# Patient Record
Sex: FEMALE | Race: BLACK OR AFRICAN AMERICAN | NOT HISPANIC OR LATINO | URBAN - METROPOLITAN AREA
[De-identification: names, ages, dates, MRNs, and addresses within clinical notes are randomized per-mention and may not be internally consistent; named-entity substitution may affect disease eponyms.]

---

## 2018-05-07 ENCOUNTER — OUTPATIENT (OUTPATIENT)
Dept: OUTPATIENT SERVICES | Facility: HOSPITAL | Age: 83
LOS: 1 days | End: 2018-05-07
Payer: COMMERCIAL

## 2018-05-07 ENCOUNTER — APPOINTMENT (OUTPATIENT)
Dept: MAMMOGRAPHY | Facility: IMAGING CENTER | Age: 83
End: 2018-05-07

## 2018-05-07 DIAGNOSIS — Z00.8 ENCOUNTER FOR OTHER GENERAL EXAMINATION: ICD-10-CM

## 2018-05-07 PROCEDURE — 77063 BREAST TOMOSYNTHESIS BI: CPT

## 2018-05-07 PROCEDURE — 77063 BREAST TOMOSYNTHESIS BI: CPT | Mod: 26

## 2018-05-07 PROCEDURE — 77067 SCR MAMMO BI INCL CAD: CPT

## 2018-05-07 PROCEDURE — 77067 SCR MAMMO BI INCL CAD: CPT | Mod: 26

## 2021-08-19 ENCOUNTER — APPOINTMENT (OUTPATIENT)
Dept: OTOLARYNGOLOGY | Facility: CLINIC | Age: 86
End: 2021-08-19
Payer: MEDICARE

## 2021-08-19 VITALS
BODY MASS INDEX: 23.05 KG/M2 | WEIGHT: 135 LBS | SYSTOLIC BLOOD PRESSURE: 160 MMHG | DIASTOLIC BLOOD PRESSURE: 74 MMHG | HEIGHT: 64 IN

## 2021-08-19 DIAGNOSIS — H93.8X3 OTHER SPECIFIED DISORDERS OF EAR, BILATERAL: ICD-10-CM

## 2021-08-19 DIAGNOSIS — Z78.9 OTHER SPECIFIED HEALTH STATUS: ICD-10-CM

## 2021-08-19 DIAGNOSIS — Z86.79 PERSONAL HISTORY OF OTHER DISEASES OF THE CIRCULATORY SYSTEM: ICD-10-CM

## 2021-08-19 PROCEDURE — 99203 OFFICE O/P NEW LOW 30 MIN: CPT | Mod: 25

## 2021-08-19 PROCEDURE — G0268 REMOVAL OF IMPACTED WAX MD: CPT

## 2021-08-19 PROCEDURE — 92567 TYMPANOMETRY: CPT

## 2021-08-19 PROCEDURE — 92557 COMPREHENSIVE HEARING TEST: CPT

## 2021-08-19 NOTE — HISTORY OF PRESENT ILLNESS
[de-identified] : 86 year old female referred by Dr Nurys Hernandes for clogged ears. Daughter states hearing has decreased from both ears. Patient denies otalgia, otorrhea, ear infections, tinnitus, dizziness, vertigo, or headaches related to hearing. Last audiology exam was done 2 years ago. \par

## 2021-08-19 NOTE — REASON FOR VISIT
[Initial Consultation] : an initial consultation for [FreeTextEntry2] : referred by Dr Nurys Hernandes for clogged ears

## 2021-08-19 NOTE — DATA REVIEWED
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 Moderate to profound essentially SNHL AD. Type A (normal) tympanograms AU.

## 2021-08-19 NOTE — CONSULT LETTER
[Dear  ___] : Dear  [unfilled], [Consult Letter:] : I had the pleasure of evaluating your patient, [unfilled]. [Please see my note below.] : Please see my note below. [Consult Closing:] : Thank you very much for allowing me to participate in the care of this patient.  If you have any questions, please do not hesitate to contact me. [Sincerely,] : Sincerely, [FreeTextEntry3] : Sae Talamantes MD, FACS \par  of Otolaryngology  \par [facility] \par [address] \[place] [address] \par Phone: [phone] \par Fax: [phone] \par \par

## 2021-09-08 ENCOUNTER — APPOINTMENT (OUTPATIENT)
Dept: NEUROLOGY | Facility: CLINIC | Age: 86
End: 2021-09-08
Payer: MEDICARE

## 2021-09-08 VITALS
OXYGEN SATURATION: 98 % | BODY MASS INDEX: 21.51 KG/M2 | SYSTOLIC BLOOD PRESSURE: 163 MMHG | HEART RATE: 65 BPM | TEMPERATURE: 97.9 F | WEIGHT: 126 LBS | HEIGHT: 64 IN | DIASTOLIC BLOOD PRESSURE: 81 MMHG

## 2021-09-08 PROCEDURE — 99205 OFFICE O/P NEW HI 60 MIN: CPT

## 2021-09-08 RX ORDER — LATANOPROST/PF 0.005 %
0.01 DROPS OPHTHALMIC (EYE)
Qty: 10 | Refills: 0 | Status: ACTIVE | COMMUNITY
Start: 2021-08-25

## 2021-09-08 NOTE — PHYSICAL EXAM
[FreeTextEntry1] : PHYSICAL EXAM\par Constitutional: Alert, no acute distress \par Neck: Full range of motion\par Psychiatric: appropriate affect and mood\par Pulmonary: No respiratory distress, stable on room air\par \par NEUROLOGICAL EXAM\par Mental status: The patient is alert, attentive, and oriented to self only and . Not to date or president or current news. \par Speech/language: No dysarthria. Need to repeat things and speak loud in order for her to comprehend tasks (+ hearing impairment)\par Cranial nerves:\par CN II: Pupil size equal and briskly reactive to light. \par CN III, IV, VI: EOMI, no nystagmus, no ptosis\par CN V: Facial sensation is intact to pinprick in all 3 divisions bilaterally.\par CN VII: Face is symmetric with normal eye closure and smile.\par CN VII: Hearing is normal to rubbing fingers\par CN IX, X: Palate elevates symmetrically. Phonation is normal.\par CN XI: Head turning and shoulder shrug are intact\par CN XII: Tongue is midline with normal movements and no atrophy.\par Motor: There is no pronator drift of out-stretched arms. 5/5 muscle power at bilat: Deltoid, Biceps, Triceps, Wrist ext, Finger abd, Hip flex, Hip ext, Knee flex, Knee ext, Ankle flex, Ankle ext\par Reflexes: Reflexes are 3+ and symmetric at the biceps and knees. Plantar responses are flexor.\par Sensory: Intact sensations to light touch in upper and lower extremities\par Coordination: Rapid alternating movements and fine finger movements are intact. There is no dysmetria on finger-to-nose. There are no abnormal or extraneous movements. \par Gait/Stance: Posture is normal. Gait is steady with normal steps, base, arm swing, and turning. \par \par \par \par MOCA :\par Total       +1 for education < 12 grade: \par \par Visuospatial/executive- 0/5 , "I do not know how to draw"\par Naming- 0/3 \par Memory/delayed recall- immediate recall 1/5, delayed recall 0/5,  \par Attention- 3/6 (unable to do serial 7's)\par Abstraction- 0/2\par Language- 1/3 (7 "F" words in 1 minute (repeats words), 9 animals in 1 minute) \par Orientation- 2/6\par

## 2021-09-08 NOTE — HISTORY OF PRESENT ILLNESS
[FreeTextEntry1] : HPI (initial visit Sep 08, 2021)- Gloria is a 86 year old RH female with hx of HTN, HLD, CAD s/p stent,  referred for hx of memory loss. She is accompanied by granddaughter, Fela. \par \par Hx obtained from pt:- "I am confused". "I don't know why I am here". She is aware she is in a doctors clinic. She tells me she lives in a house in [place] with family- granddaughter, daughter and her . She could not recall what her occupation was in the past or when she retired. 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 When asked how many children she has, she counts with her fingers and says "6"-2 girls and 4 boys. 2 boys in [place] and 2 in the [facility] ([place]). "Memory is not to good, I forget". "I used to cook". Family cooks for her. She does not drive. Does not know why we are wearing masks and does not know current president.\par \par Hx obtained from granddaughter-Memory issues x 3-4 years. She had a cognitive evaluation through insurance and did poorly. She forgets conversations in 5 minutes. She cannot stay alone at home, she gets confused. She corrects pt and says they lives in [place] and not [place]. She experiences sundowning at night and gets confused if she lives house at night. She remembers names of family members she frequently meets. She stays in [place] in calles and summers she is in [place], travels back and forth. Someone is almost always with her at home. She is able to feed and dress herself. She can fold clothes, but not do laundry. She takes her own medications. She has daily routine. no hallucinations. no paranoia. no personality changes. She gets tearful easily for no particular reason. \par \par She had a sister with "dementia".

## 2021-09-08 NOTE — ASSESSMENT
[FreeTextEntry1] : Assessment/Plan:\par  3-4 year hx of progressive cognitive decline which interferes with daily activities (requires assistance with most iADLs)\par \par No focal neurological signs on exam or parkinsonism. Exam is notable for hearing impairment. On cognitive assessment (MOCA), she scored a 7/30 with significant executive and memory impairments. \par \par Dementia without behavioral disturbance- suspect Alzheimers disease. Will complete work up for other causes of dementia, including vascular and metabolic etiologies \par \par Counseled family on the diagnosis of dementia- presumedly Alzheimers disease- and the progression of disease. Alzheimers is a progressive neurodegenerative disease which affects memory, language and thinking. There are no curative or disease modifying treatments. We discussed the different cognition enhancing therapies, including donepezil and memantine, and discussed the potential side effects. Explained that the therapies do not halt the progression. Given her severe dementia, I do not suspect she would have much benefit from treatment and it may lead to more confusion. Granddaughter understands and will speak to family and reach back out to clinic to discuss further if they wish to try therapy. \par \par Plan:-\par [] Will order labs for reversible causes of memory loss\par [] WIll order MRI brain - memory protocol\par [] Will refer to geriatric clinic for social work needs\par [] Advised use of hearing aids- patient has hearing impairment. \par \par Return to clinic 6 months, or sooner if needed. Will call with results of labs and MRI brain\par \par Melita Robles M.D\par

## 2021-09-25 ENCOUNTER — APPOINTMENT (OUTPATIENT)
Dept: MRI IMAGING | Facility: IMAGING CENTER | Age: 86
End: 2021-09-25

## 2021-10-19 ENCOUNTER — TRANSCRIPTION ENCOUNTER (OUTPATIENT)
Age: 86
End: 2021-10-19

## 2021-10-20 ENCOUNTER — LABORATORY RESULT (OUTPATIENT)
Age: 86
End: 2021-10-20

## 2021-10-26 ENCOUNTER — APPOINTMENT (OUTPATIENT)
Dept: MRI IMAGING | Facility: CLINIC | Age: 86
End: 2021-10-26
Payer: MEDICARE

## 2021-10-26 ENCOUNTER — OUTPATIENT (OUTPATIENT)
Dept: OUTPATIENT SERVICES | Facility: HOSPITAL | Age: 86
LOS: 1 days | End: 2021-10-26
Payer: COMMERCIAL

## 2021-10-26 DIAGNOSIS — R41.3 OTHER AMNESIA: ICD-10-CM

## 2021-10-26 PROCEDURE — 70551 MRI BRAIN STEM W/O DYE: CPT | Mod: 26

## 2021-10-26 PROCEDURE — 70551 MRI BRAIN STEM W/O DYE: CPT

## 2021-11-01 ENCOUNTER — NON-APPOINTMENT (OUTPATIENT)
Age: 86
End: 2021-11-01

## 2021-11-01 ENCOUNTER — APPOINTMENT (OUTPATIENT)
Dept: GERIATRICS | Facility: CLINIC | Age: 86
End: 2021-11-01
Payer: MEDICARE

## 2021-11-01 VITALS
HEART RATE: 78 BPM | WEIGHT: 130.25 LBS | HEIGHT: 64 IN | DIASTOLIC BLOOD PRESSURE: 80 MMHG | OXYGEN SATURATION: 98 % | RESPIRATION RATE: 16 BRPM | SYSTOLIC BLOOD PRESSURE: 110 MMHG | BODY MASS INDEX: 22.24 KG/M2 | TEMPERATURE: 97.3 F

## 2021-11-01 DIAGNOSIS — Z81.8 FAMILY HISTORY OF OTHER MENTAL AND BEHAVIORAL DISORDERS: ICD-10-CM

## 2021-11-01 DIAGNOSIS — H26.9 UNSPECIFIED CATARACT: ICD-10-CM

## 2021-11-01 PROCEDURE — G0008: CPT | Mod: 59

## 2021-11-01 PROCEDURE — 99205 OFFICE O/P NEW HI 60 MIN: CPT | Mod: 25

## 2021-11-01 PROCEDURE — 90662 IIV NO PRSV INCREASED AG IM: CPT | Mod: 59

## 2021-11-01 PROCEDURE — G0444 DEPRESSION SCREEN ANNUAL: CPT | Mod: 59

## 2021-11-01 RX ORDER — ASCORBIC ACID/MULTIVIT-MIN 1000 MG
EFFERVESCENT POWDER IN PACKET ORAL
Refills: 0 | Status: ACTIVE | COMMUNITY
Start: 2021-11-01

## 2021-11-17 ENCOUNTER — APPOINTMENT (OUTPATIENT)
Dept: GERIATRICS | Facility: CLINIC | Age: 86
End: 2021-11-17
Payer: MEDICARE

## 2021-11-17 VITALS
TEMPERATURE: 97.4 F | HEART RATE: 74 BPM | HEIGHT: 64.8 IN | OXYGEN SATURATION: 99 % | RESPIRATION RATE: 16 BRPM | DIASTOLIC BLOOD PRESSURE: 74 MMHG | BODY MASS INDEX: 21.73 KG/M2 | SYSTOLIC BLOOD PRESSURE: 130 MMHG | WEIGHT: 130.4 LBS

## 2021-11-17 DIAGNOSIS — Z91.81 HISTORY OF FALLING: ICD-10-CM

## 2021-11-17 PROCEDURE — 99483 ASSMT & CARE PLN PT COG IMP: CPT

## 2021-12-02 ENCOUNTER — APPOINTMENT (OUTPATIENT)
Dept: GERIATRICS | Facility: CLINIC | Age: 86
End: 2021-12-02

## 2021-12-06 LAB
ALBUMIN SERPL ELPH-MCNC: 4.4 G/DL
ALP BLD-CCNC: 63 U/L
ALT SERPL-CCNC: 28 U/L
ANION GAP SERPL CALC-SCNC: 14 MMOL/L
AST SERPL-CCNC: 32 U/L
BILIRUB SERPL-MCNC: 0.3 MG/DL
BUN SERPL-MCNC: 35 MG/DL
CALCIUM SERPL-MCNC: 10.3 MG/DL
CHLORIDE SERPL-SCNC: 102 MMOL/L
CO2 SERPL-SCNC: 27 MMOL/L
CREAT SERPL-MCNC: 1.53 MG/DL
FOLATE SERPL-MCNC: 8.4 NG/ML
GLUCOSE SERPL-MCNC: 87 MG/DL
POTASSIUM SERPL-SCNC: 4.9 MMOL/L
PROT SERPL-MCNC: 7.3 G/DL
SODIUM SERPL-SCNC: 143 MMOL/L
T PALLIDUM AB SER QL IA: ABNORMAL
THYROGLOB AB SERPL-ACNC: <20 IU/ML
THYROPEROXIDASE AB SERPL IA-ACNC: <10 IU/ML
TSH SERPL-ACNC: 2.12 UIU/ML
VIT B1 SERPL-MCNC: 135.2 NMOL/L
VIT B12 SERPL-MCNC: 747 PG/ML

## 2022-01-18 ENCOUNTER — APPOINTMENT (OUTPATIENT)
Dept: GERIATRICS | Facility: CLINIC | Age: 87
End: 2022-01-18
Payer: MEDICARE

## 2022-01-18 VITALS
BODY MASS INDEX: 22.16 KG/M2 | RESPIRATION RATE: 16 BRPM | HEIGHT: 64.8 IN | DIASTOLIC BLOOD PRESSURE: 74 MMHG | WEIGHT: 133 LBS | SYSTOLIC BLOOD PRESSURE: 156 MMHG

## 2022-01-18 PROCEDURE — 99215 OFFICE O/P EST HI 40 MIN: CPT

## 2022-01-18 NOTE — ASSESSMENT
[FreeTextEntry1] : Social Care Plan\par 1. Respite: \par \par -Provided hotline contact to Alzheimer's Association [phone].\par -provided [place] [facility] contact to assist with resources and caregiver support. \par -Provided link to www. communityresourcefinder.org\par -provided [facility] agency \par -contact to ANTONIETA Lewis.\par \par \par 2. Caregiver Education:\par Sent e-mail to -\par \par I wanted to share useful tools to help manage your family member's dementia behaviors. I have included caregiver training videos from [facility] Dementia [facility] to help guide you and caretakers, as well as a daily care plan from the Alzheimer's Association. Maintaining a schedule and a structured day helps patients with dementia. I am available if you need any further assistance or have any questions. See below:\par 1.Agitation & Anxiety:\par  https://iPouritu.be/[email or web address]\par 2. Safety\par 3. Alzheimer's Association Daily Care Plan:\par  https://www.alz.org/help-support/caregiving/daily-care/daily-care-plan\par 4. Educated daughter  behaviors occur to acknowledge patient's emotions and redirect behavior with distractions, address physical needs. Provided examples. Provided examples. Daughter agreed to try this non-pharmacological approach. \par \par \par \par Look through books, picture albums, coloring books, painting, puzzles, play with ball/balloon, listen to music, stress ball, fold small items.\par \par 3. Alzheimer's Association Community Resource Finder \par    www.alz.org/nca/helping you/community-resource-finder \par \par \par 4. Caregiver support:\par -contact to  \par \par 4. Safety: \par -Pt. is accompanied 24/7 and reports safety concerns are not an issue at the moment.  Continue will fall safety precautions. \par \par 5. Caregiver stress:\par -.Counseled on non-pharmacological interventions for stress which include medication saritha (Calm), daily physical activity. \par 6. PT with [facility]\par 7. [facility] education folder attached PFD. \par \par \par \par -task sent to MD Child\par -ADC acuity RED, trial of SSRI, lack of primary care, Q1 month f/u.\par -available for urgent visits and as needed by phone. \par -direct patient time 1:30pm-2:30pm\par \par  Mary Purdy, MARY LOUP-BC

## 2022-01-18 NOTE — REVIEW OF SYSTEMS
[Feeling Poorly] : not feeling poorly [Feeling Tired] : not feeling tired [Sore Throat] : no sore throat [Chest Pain] : no chest pain [Palpitations] : no palpitations [Shortness Of Breath] : no shortness of breath [Cough] : no cough [SOB on Exertion] : no shortness of breath during exertion [Constipation] : no constipation [Diarrhea] : no diarrhea [Incontinence] : no incontinence [de-identified] : reported large circular scar on abdomen from childhood

## 2022-01-18 NOTE — DATA REVIEWED
[FreeTextEntry1] : MRI head- b/l scattered small vessel white matter disease ischemic changes, no acute infarcts or mass 9/14/21\par B12/folate WDL\par TSH 2.12 WDL\par CMP Cr 1.53 BUN 35\par syphilis confirmatory non-reactive\par \par Na 143 WDL\par \par

## 2022-01-18 NOTE — PHYSICAL EXAM
[Alert] : alert [Well Nourished] : well nourished [Well Developed] : well developed [Sclera] : the sclera and conjunctiva were normal [Normal Oral Mucosa] : normal oral mucosa [No Oral Pallor] : no oral pallor [Normal Outer Ear/Nose] : the ears and nose were normal in appearance [Normal Appearance] : the appearance of the neck was normal [No Respiratory Distress] : no respiratory distress [No Acc Muscle Use] : no accessory muscle use [Respiration, Rhythm And Depth] : normal respiratory rhythm and effort [Auscultation Breath Sounds / Voice Sounds] : lungs were clear to auscultation bilaterally [Normal PMI] : the apical impulse was abnormal [Normal S1, S2] : normal S1 and S2 [Murmurs] : no murmurs [Heart Rate And Rhythm] : heart rate was normal and rhythm regular [Edema] : edema was not present [Bowel Sounds] : normal bowel sounds [Abdomen Soft] : soft [Cervical Lymph Nodes Enlarged Posterior Bilaterally] : posterior cervical [Supraclavicular Lymph Nodes Enlarged Bilaterally] : supraclavicular [Cervical Lymph Nodes Enlarged Anterior Bilaterally] : anterior cervical, supraclavicular [Axillary Lymph Nodes Enlarged Bilaterally] : axillary [No CVA Tenderness] : no CVA  tenderness [No Spinal Tenderness] : no spinal tenderness [] : no rash [No Focal Deficits] : no focal deficits [Normal Affect] : the affect was normal [Normal Mood] : the mood was normal [de-identified] : elderly female, well dressed,interactive, answers appropriately, smiled, quiet today [FreeTextEntry1] : wearing glasses [de-identified] : midline abdomen scar noted on LUQ,LLQ, not painful to touch [de-identified] : ataxic gait [de-identified] : smiled

## 2022-01-18 NOTE — SOCIAL HISTORY
[With family] : lives with family [Female] : Female [FreeTextEntry1] : Justa Hebert [FreeTextEntry4] : granddaughter [[facility] Ford HospitalPHQ-9Score] : 1 [CaregiverMSCIScore] : 18 [CaregiverDBSScore] : 28

## 2022-01-18 NOTE — HISTORY OF PRESENT ILLNESS
[One fall no injury in past year] : Patient reported one fall in the past year without injury [Patient is independent with] : bathing [Independent] : transferring/mobility [Full assistance needed] : Assistance needed managing medications [] : Assistance needed managing finances. [FAST Score: ____] : Functional Assessment Scale (FAST) Score: [unfilled] [Night Light] : night light [Anti-Slip Measures] : anti-slip measures [Mild] : Stage: Mild [Worse] : Status: Worse [Reviewed no changes] : Reviewed, no changes [I will adhere to the patient's wishes.] : I will adhere to the patient's wishes. [FreeTextEntry1] : ADC Program ID:[id number]\par \par Ms. BOO PRASAD is an 87 yo F with PMhx of dementia, CAD, HTN, HLD, gait instability presents for follow up visit  Alzheimer's and Dementia Program visit referred by MD Child for comanagement of dementia-related issues and coordination of dementia care.  \par Pt. presents with daughter (Sarah) and granddaughter (Justa) who assisted with hx intake due to pt.'s dementia. \par \par \par #dementia\par -I'm doing Ok"\par -sleeping OK\par -wakes up with a lot of pain \par -wakes up and days she's dizzy \par -always wants to go home,counseled  \par -was doing really well with PT, completed \par \par #depression\par -no interest of doing anything \par -poor appetite \par -always full \par -not eating well with meals \par -use to love to dance, now does not want to be around music \par -no SI\par \par #dizziness/low BP  \par -daughter stopped her lisinopril 20mg 1/1/22 because pt. kept complaining of dizziness\par -not able to reach PCP easily \par \par #chronic pain\par -not taking pain medications often, "only when really needed"\par -advised to  tylenol daily, not to wait for pain\par \par #HCM\par -not happy with previous PCP, had only seen Dr. Hernandes 1-2x\par -will establish care with Dr. Child\par \par \par Goal \par -primary care visit asap \par -[place] [facility] -[place] with [facility] faxed today\par -SSRI trial \par \par \par Denies pain. Denies acute visits/falls. Denies HA/dizziness, SOB/CP, abdominal pain, dysuria, reports daily BMs regular. \par \par \par (11/17/21)\par #dementia\par -appreciated Dr. Child note 11/1/2021\par -memory symptoms started approx 2016\par -appreciated Dr. Robles  note [facility] 6/30 +1 education= 7/30 on 9/8/21\par -confusion, repetitive with questions\par -forgetful with days of the week \par -forgetful with everything, forgets family members names\par -will not be returning to [place] anytime soon, previously retired there\par -sleeping well \par -cries, frustrated last 15 minutes then redirectable\par -stays home, and just does puzzles\par -GD stated maybe "mild depression"\par -back from [place] in June 2021\par -retired 25 years ago, was traveling back and forth to [facility] since then. \par -likes to dance, pt. stated "dancing" \par -not interested in going to a program, "prefer to stay home and relax unless its a party"\par -giving her Boost shakes, 1/day, counseled to continue with shakes \par -counseled on antipsychotics risks vs benefits,behaviors manageable with non-pharmacological strategies at the moment\par \par \par #caregiver burden\par -causing stress;anxiety \par -daughter works FT, has to take off to take mom to doctor \par -GD is on maternity leave, currently pregnant, she is a  \par \par \par #[place]\par -reports hearing problems\par -ENT advised hearing aids may not be helpful with understanding words,reviewed note\par -Dr. Robles advised \par - used pocket talker in office today, pt. responded well to it \par \par #falls/knee and back pain\par -would like to try PT with [facility],rx today\par -for chronic knee and back pain, daughter reports they do not with OTC pain meds, relaxes and goes away.\par -counseled on tx pain, ES tylenol and lidocaine patches \par \par Denies pain. Denies acute visits/falls. Denies HA/dizziness, SOB/CP, abdominal pain, dysuria, reports daily BMs regular. \par \par \par Goals\par -[place],hearing aids\par -PT\par -intermittent FMLA for Sarah \par -medicaid family HHA program, needs to meet with ANTONIETA Lewis\par \par Family hx\par -sister had memory impairment\par *CAREGIVER 1: \par Name/Relationship: Sarah Carrasco,daughter\par Involvement in care:\par Mailing Address:[address] [place] [address]\par Phone Number:[phone]\par E-mail:\par Best time to reach this person: evening\par Patient permission to contact this person:yes\par \par CAREGIVER 2: \par Name/Relationship:Justa Hebert,granddaughter\par Involvement in care:lives with pt.\par Mailing Address:\par Phone Number:[phone]\par E-mail:\par Best time to reach this person: \par Patient permission to contact this person:yes\par \par Primary Care Physician:Dr. Nurys Hernandes [phone] Fax [phone] [address] [place] [address]\par Physicians:Dr. Child\par Neurologists:Dr. Melita Robles  fax [phone]\par ENT: Dr. Sae Talamantes, advised hearing aids, not gotten them \par \par \par \par Suspect Medications (associated with mental status changes): no\par Recent hospitalization/ ED Visits/ Nursing Home Stays:no\par \par Social History:\par Primary Language:English \par Marital Status:\par Children:6 kids, main caretakers, 2 daughters here in [place]. Sons are in [place] and [place]. 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[place], seamress\par Activity/Exercise:no exercise \par Alcohol:no \par Sexually active: no\par Driving Habits:no \par Firearms:no\par \par Living Situation:\par Housing (stairs/levels): single family, multi story 3 floors \par Length at Residence:a few months\par Lives with:daughter and granddaughter\par Caregivers (non-paid and paid):\par Safety Concerns: none \par Wandering:no, no cameras\par Falls:last fall in 2/2021, no injury, back pain \par Fear of Falling: yes\par SOMEONE IS ALWAYS HOME WITH PT, at most 1 hr left alone\par \par Financial Situation: "SS that’s it"\par \par Advance Directives:\par HCP/Advance Directives/Living will: no HCP, intake document call Sarah Carrasco,daughter [phone]\par HCP/Alternate Decision Maker:\par MOLST: would like us to allow natural death "yes, when the time comes" stated pt. \par What matters most? "my health" \par \par need Dr. Child to assist with completing MOLST, no HCP.  [Grab Bars] : no grab bars [Shower Chair] : no shower chair [Driving Concerns] : not driving or driving without noted concerns [FreeTextEntry2] : repetitive wearing the same clothing  [de-identified] : 5 [de-identified] : dials for her [de-identified] : 0 [de-identified] : none, needs assistive devices  [de-identified] : counseled on grab bars  [CornellScale] : 17 1/18/22 [FreeTextEntry] : 4 [de-identified] : 5 [NPI-QTotalScore] : 9 [de-identified] : anxiety worst behaviors, redirectable [AdvancecareDate] : 1/18/22 [FreeTextEntry4] : Advance Directives:\par HCP/Advance Directives/Living will: no HCP, intake document call Sarah Carrasco,daughter [phone]\par HCP/Alternate Decision Maker:\par MOLST: would like us to allow natural death "yes, when the time comes" stated pt. \par What matters most? "my health" \par \par pending [facility] discussion

## 2022-01-18 NOTE — REASON FOR VISIT
[Follow-Up] : a follow-up visit [FreeTextEntry1] : was referred to the Alzheimer's and Dementia Program by MD Child for comanagement of dementia-related issues and coordination of dementia care.

## 2022-02-22 ENCOUNTER — APPOINTMENT (OUTPATIENT)
Dept: GERIATRICS | Facility: CLINIC | Age: 87
End: 2022-02-22
Payer: MEDICARE

## 2022-02-22 PROCEDURE — 99442: CPT

## 2022-03-03 ENCOUNTER — LABORATORY RESULT (OUTPATIENT)
Age: 87
End: 2022-03-03

## 2022-03-09 ENCOUNTER — APPOINTMENT (OUTPATIENT)
Dept: NEUROLOGY | Facility: CLINIC | Age: 87
End: 2022-03-09

## 2022-03-14 ENCOUNTER — APPOINTMENT (OUTPATIENT)
Dept: GERIATRICS | Facility: CLINIC | Age: 87
End: 2022-03-14
Payer: MEDICARE

## 2022-03-14 ENCOUNTER — NON-APPOINTMENT (OUTPATIENT)
Age: 87
End: 2022-03-14

## 2022-03-14 VITALS
WEIGHT: 134 LBS | DIASTOLIC BLOOD PRESSURE: 72 MMHG | TEMPERATURE: 97.8 F | RESPIRATION RATE: 15 BRPM | OXYGEN SATURATION: 98 % | BODY MASS INDEX: 22.44 KG/M2 | SYSTOLIC BLOOD PRESSURE: 136 MMHG | HEART RATE: 65 BPM

## 2022-03-14 VITALS — DIASTOLIC BLOOD PRESSURE: 64 MMHG | SYSTOLIC BLOOD PRESSURE: 148 MMHG | HEART RATE: 65 BPM

## 2022-03-14 VITALS — HEART RATE: 79 BPM | SYSTOLIC BLOOD PRESSURE: 138 MMHG | DIASTOLIC BLOOD PRESSURE: 72 MMHG

## 2022-03-14 PROCEDURE — 99215 OFFICE O/P EST HI 40 MIN: CPT | Mod: 25

## 2022-03-14 PROCEDURE — 93000 ELECTROCARDIOGRAM COMPLETE: CPT

## 2022-03-14 RX ORDER — METOPROLOL TARTRATE 75 MG/1
TABLET, FILM COATED ORAL
Refills: 0 | Status: DISCONTINUED | COMMUNITY
End: 2022-03-14

## 2022-03-14 RX ORDER — INDAPAMIDE 1.25 MG/1
1.25 TABLET, FILM COATED ORAL DAILY
Refills: 0 | Status: DISCONTINUED | COMMUNITY
End: 2022-03-14

## 2022-03-14 RX ORDER — LISINOPRIL 20 MG/1
20 TABLET ORAL
Qty: 90 | Refills: 1 | Status: DISCONTINUED | COMMUNITY
End: 2022-03-14

## 2022-05-18 ENCOUNTER — APPOINTMENT (OUTPATIENT)
Dept: CARDIOLOGY | Facility: CLINIC | Age: 87
End: 2022-05-18
Payer: MEDICARE

## 2022-05-18 ENCOUNTER — NON-APPOINTMENT (OUTPATIENT)
Age: 87
End: 2022-05-18

## 2022-05-18 VITALS
DIASTOLIC BLOOD PRESSURE: 80 MMHG | WEIGHT: 135 LBS | BODY MASS INDEX: 22.6 KG/M2 | HEART RATE: 72 BPM | OXYGEN SATURATION: 97 % | SYSTOLIC BLOOD PRESSURE: 168 MMHG

## 2022-05-18 PROCEDURE — 93000 ELECTROCARDIOGRAM COMPLETE: CPT

## 2022-05-18 PROCEDURE — 99204 OFFICE O/P NEW MOD 45 MIN: CPT

## 2022-05-18 NOTE — PHYSICAL EXAM
[No Clubbing, Cyanosis] : no clubbing or cyanosis of the fingernails [Involuntary Movements] : no involuntary movements were seen [Motor Tone] : muscle strength and tone were normal [Normal] : normal skin color and pigmentation [No Focal Deficits] : no focal deficits [Normal Affect] : the affect was normal [Normal Mood] : the mood was normal [Normal Hearing] : hearing was not normal [Normal Gait] : abnormal gait [Normal Insight/Judgment] : insight and judgment were not intact [de-identified] : KAYLAH [de-identified] : hesitates when walking, slow cautious gait  [de-identified] : confused [de-identified] : calm, cooperative

## 2022-05-18 NOTE — ASSESSMENT
[FreeTextEntry1] : EKG done and reviewed today: SR @ 68bpm, LBBB, no previoius to compare \par Cards ref given for eval\par \par - WOuld benefit from formal help at home\par - c/w 24/7 supervision for safety and assistance w/ ADLs\par \par - Adv f/u with audiology for trial of HAs\par - f/u with optho adv \par - Fall precuations discussed\par - Adv to consider bathroom grab bars\par - PT referral\par \par f/u with WILEY Hernandez for ADC program as per acuity protocol\par \par Rest as per PMD\par \par f/u with me PRN\par

## 2022-05-18 NOTE — HISTORY OF PRESENT ILLNESS
[Patient reported hearing was abnormal] : Patient reported hearing was abnormal [One fall no injury in past year] : Patient reported one fall in the past year without injury [Independent] : transferring/mobility [Full assistance needed] : Assistance needed managing medications [] : Assistance needed managing finances. [Smoke Detector] : smoke detector [Carbon Monoxide Detector] : carbon monoxide detector [Grab Bars] : grab bars [Night Light] : night light [Wears Seat Belt] : wears seat belt [1] : 2) Feeling down, depressed, or hopeless for several days (1) [PHQ-2 Negative - No further assessment needed] : PHQ-2 Negative - No further assessment needed [Patient declined colon rectal/cancer screening] : Patient declined colon/rectal cancer screening [Patient declined breast sonogram] : Patient declined breast sonogram [Patient declined cervical cancer screening] : Patient declined cervical cancer screening [FreeTextEntry1] : \par Mood is better, more alert/interactive, better appetite. \par Doing PT. \par BP readings at home in 110s/50s-70s\par \par Pt denies complaints - doesn't know why she is here - states her "daughter knows" and defers history to family. \par \par DEMENTIA / DEPRESSION / ANXIETY \par - Initial symptoms / Dx: Forgetfullness started approx 2016.  Sometimes forgets family member names.  Forgets her age, dates, forgets where placed glasses few minutes ago. Slow progression over past several years. Sometimes left alone at home but not for more than 1 hr.  NO longer cooks but used to. \par \par - Appetite: fair, sometimes doesn't want to eat, gets boost daily, gained 5 lbs in past month \par - Motor Syx: chronic back pain, sometimes off balance and "shaky" when walking.  \par Ambulates unassisted. \par Last fall in 2/21 - when getting up to go to the bathroom in the middle of the night. No injury. \par \par -Sleep:  sleeps approx 10hrs/night for long time \par \par - Previous cognitive testing: [facility] 7/30 on 9/8/21 w/ neuro Dr. Robles \par - Labs: reviewed in EMR from 9/21: Cr 1.53H\par - MRI HEAD 10/26/21: b/l scattered small vessel white matter ischemic changes. \par \par [x ] Neuro Dr. Melita Robles - visit note appreciated in EMR \par [ ] Psych\par \par HTN / HLD / CAD s/p stent ~2013 \par - Follows w/ cards Dr. Kulkarni\par \par PMD: Dr. Nurys Barry - last seen in July '21.  [PapSmeardate] : 6/21 [TextBox_31] : had hearing loss but was told may not benefit from HAs d/t might "make her more confused"  [BoneDensityDate] : 3/22  [TextBox_37] : follows w/ optho Dr. Ca Camacho regularly  [FreeTextEntry7] : had [employer] Covid vaccine x 2, previously had flu vaccien without complications - at first visit  [Guns at Home] : no guns at home [Driving Concerns] : not driving or driving without noted concerns [[facility]] : >12  [AWA9Nkckf] : 2 [de-identified] :  BPSD: more fiesty than previous baseline - has always been very timid now more outspoken,  [AdvancecareDate] : 3/14/22 [FreeTextEntry4] : No HCP. . 6 children - Raudel, Moshe, Flo, Wilfrid, Naty, Harvinder.

## 2022-05-18 NOTE — REASON FOR VISIT
[Follow-Up] : a follow-up visit [FreeTextEntry1] : dementia [FreeTextEntry3] : dtnatalie Alfonso and granddaughter Justa  [FreeTextEntry2] : who assist with history d/t pt limited historian d/t baseline memory loss

## 2022-05-27 NOTE — HISTORY OF PRESENT ILLNESS
[FreeTextEntry1] : Patient is an 86 year-old Black woman who has recently moved and is changing doctors. She has a known past medical history of hypertension, dyslipidemia, coronary artery disease status post PCI (2013), with known dementia, who was recently noted to have a LBBB on her ECG.\par She has no new cardiovascular complaints. \par She continues walking and dancing for exercise.

## 2022-05-27 NOTE — REASON FOR VISIT
[Arrhythmia/ECG Abnorrmalities] : arrhythmia/ECG abnormalities [Coronary Artery Disease] : coronary artery disease [Other: _____] : [unfilled]

## 2022-05-27 NOTE — DISCUSSION/SUMMARY
[FreeTextEntry1] : Patient is an 86 year-old Black woman with multiple cardiovascular risk factors and known coronary artery disease, presents for evaluation of a LBBB seen on ECG.\par She has no acute cardiac complaints.\par She has dementia that is mild, but progressive. \par \par Continue ASA and statin therapy for patient with known coronary artery disease status post PCI.\par Continue beta-blocker.\par Monitor blood pressure and add antihypertensives as needed.\par \par Follow-up TTE to evaluate for ischemic cardiomyopathy.

## 2022-06-01 ENCOUNTER — APPOINTMENT (OUTPATIENT)
Dept: GERIATRICS | Facility: CLINIC | Age: 87
End: 2022-06-01
Payer: MEDICARE

## 2022-06-01 PROCEDURE — 99443: CPT

## 2022-06-03 ENCOUNTER — NON-APPOINTMENT (OUTPATIENT)
Age: 87
End: 2022-06-03

## 2022-06-14 ENCOUNTER — APPOINTMENT (OUTPATIENT)
Dept: CARDIOLOGY | Facility: CLINIC | Age: 87
End: 2022-06-14

## 2022-06-27 ENCOUNTER — APPOINTMENT (OUTPATIENT)
Dept: GERIATRICS | Facility: CLINIC | Age: 87
End: 2022-06-27
Payer: MEDICARE

## 2022-06-27 VITALS
DIASTOLIC BLOOD PRESSURE: 84 MMHG | SYSTOLIC BLOOD PRESSURE: 152 MMHG | BODY MASS INDEX: 22.44 KG/M2 | OXYGEN SATURATION: 96 % | WEIGHT: 134 LBS | HEART RATE: 74 BPM | TEMPERATURE: 98.2 F | RESPIRATION RATE: 15 BRPM

## 2022-06-27 PROCEDURE — 99214 OFFICE O/P EST MOD 30 MIN: CPT

## 2022-07-01 NOTE — HISTORY OF PRESENT ILLNESS
[Patient reported hearing was abnormal] : Patient reported hearing was abnormal [Patient declined colon rectal/cancer screening] : Patient declined colon/rectal cancer screening [Patient declined breast sonogram] : Patient declined breast sonogram [Patient declined cervical cancer screening] : Patient declined cervical cancer screening [One fall no injury in past year] : Patient reported one fall in the past year without injury [Independent] : transferring/mobility [Full assistance needed] : Assistance needed managing medications [] : Assistance needed managing finances. [Smoke Detector] : smoke detector [Carbon Monoxide Detector] : carbon monoxide detector [Grab Bars] : grab bars [Night Light] : night light [Wears Seat Belt] : wears seat belt [1] : 2) Feeling down, depressed, or hopeless for several days (1) [PHQ-2 Negative - No further assessment needed] : PHQ-2 Negative - No further assessment needed [FreeTextEntry1] : Since last visit seen by cards Dr. López on 5/18/22 - visit note appreciated in EMR \par \par Seen by WILEY Purdy on 6/1/22 - visit note appreciated in EMR - c/w ASA, statin for know CAD s/p PCI, c/w BB, f/u TTE. \par \par TODAY pt denies complaints - doesn't know why she is here - states her "daughter knows" and defers history to family. \par \par Episode of wandering outside of home since last visit. \par \par Completed PT. Has list of exercises from PT. \par \par First covid vaccine in [place]. Booster in [place].  Getting COVID booster next sat. \par \par DEMENTIA / DEPRESSION / ANXIETY \par - 11/21: Dx: Forgetfullness started approx 2016.  Sometimes forgets family member names.  Forgets her age, dates, forgets where placed glasses few minutes ago. Slow progression over past several years. Sometimes left alone at home but not for more than 1 hr.  NO longer cooks but used to. \par \par - Appetite: fair, sometimes doesn't want to eat, gets boost daily\par - Motor Syx: chronic back pain, sometimes off balance and "shaky" when walking.  \par Ambulates unassisted. \par Last fall in 2/21 - when getting up to go to the bathroom in the middle of the night. No injury. \par \par - Sleep:  sleeps approx 10hrs/night for long time \par \par - Previous cognitive testing: MoCA 7/30 on 9/8/21 w/ neuro Dr. Robles \par - Labs: reviewed in EMR from 9/21: Cr 1.53H\par - MRI HEAD 10/26/21: b/l scattered small vessel white matter ischemic changes. \par \par [x ] Neuro Dr. Melita Robles\par [ ] Psych\par \par HTN / HLD / CAD s/p stent ~2013 \par - Follows w/ cards Dr. Kulkarni\par \par PMD: Dr. Nurys Hernandes  [PapSmeardate] : 6/21 [TextBox_31] : had hearing loss but was told would not benefit from HAs d/t might "make her more confused"  [TextBox_37] : follows w/ optho Dr. Ca Camacho regularly  [BoneDensityDate] : 3/22  [FreeTextEntry7] : had [employer] Covid vaccine x 2, previously had flu vaccien without complications [Driving Concerns] : not driving or driving without noted concerns [Guns at Home] : no guns at home [[facility]] [facility] >12  [[id number]] : 2 [de-identified] :  BPSD: more fiesty than previous baseline - has always been very timid now more outspoken,  [AdvancecareDate] : 3/14/22 [FreeTextEntry4] : No HCP. . 6 children - Raudel, Moshe, Flo, Wilfrid, Naty, Harvinder.

## 2022-07-01 NOTE — ASSESSMENT
[FreeTextEntry1] : - Education, counseling, support provided today\par \par Cards eval appreciated in EMR - pending TTE \par \par Requests ref to dentist- provided today    \par \par - WOuld benefit from formal help at home\par - c/w 24/7 supervision for safety and assistance w/ ADLs\par \par - Adv f/u with audiology for trial of HAs\par - f/u with optho reg adv \par - Fall precautions discussed\par - Adv to consider bathroom grab bars\par - c/w PT\par \par f/u with WILEY Hernandez for ADC program as per acuity protocol\par \par Rest as per PMD\par \par f/u with me PRN\par

## 2022-07-01 NOTE — PHYSICAL EXAM
[No Clubbing, Cyanosis] : no clubbing or cyanosis of the fingernails [Involuntary Movements] : no involuntary movements were seen [Motor Tone] : muscle strength and tone were normal [Normal] : normal skin color and pigmentation [No Focal Deficits] : no focal deficits [Normal Affect] : the affect was normal [Normal Mood] : the mood was normal [Normal Gait] : abnormal gait [Normal Hearing] : hearing was not normal [de-identified] : KAYLAH [Normal Insight/Judgment] : insight and judgment were not intact [de-identified] : hesitates when walking, slow cautious gait  [de-identified] : confused [de-identified] : calm, cooperative

## 2022-08-29 ENCOUNTER — APPOINTMENT (OUTPATIENT)
Dept: GERIATRICS | Facility: CLINIC | Age: 87
End: 2022-08-29

## 2022-08-29 PROCEDURE — 99441: CPT

## 2022-10-17 ENCOUNTER — RX RENEWAL (OUTPATIENT)
Age: 87
End: 2022-10-17

## 2022-11-01 ENCOUNTER — APPOINTMENT (OUTPATIENT)
Dept: GERIATRICS | Facility: CLINIC | Age: 87
End: 2022-11-01

## 2022-11-01 VITALS
HEART RATE: 75 BPM | DIASTOLIC BLOOD PRESSURE: 80 MMHG | TEMPERATURE: 97.2 F | HEIGHT: 64 IN | WEIGHT: 135 LBS | RESPIRATION RATE: 16 BRPM | OXYGEN SATURATION: 98 % | SYSTOLIC BLOOD PRESSURE: 142 MMHG | BODY MASS INDEX: 23.05 KG/M2

## 2022-11-01 PROCEDURE — 99483 ASSMT & CARE PLN PT COG IMP: CPT

## 2022-11-01 PROCEDURE — 90662 IIV NO PRSV INCREASED AG IM: CPT

## 2022-11-01 PROCEDURE — 99355: CPT

## 2022-11-01 PROCEDURE — G0008: CPT

## 2022-11-01 PROCEDURE — 99354: CPT

## 2022-11-08 ENCOUNTER — APPOINTMENT (OUTPATIENT)
Dept: GERIATRICS | Facility: CLINIC | Age: 87
End: 2022-11-08

## 2022-11-08 VITALS
OXYGEN SATURATION: 97 % | BODY MASS INDEX: 23.17 KG/M2 | WEIGHT: 135 LBS | RESPIRATION RATE: 15 BRPM | SYSTOLIC BLOOD PRESSURE: 134 MMHG | TEMPERATURE: 98 F | DIASTOLIC BLOOD PRESSURE: 70 MMHG | HEART RATE: 66 BPM

## 2022-11-08 DIAGNOSIS — Z91.83 UNSPECIFIED DEMENTIA WITH BEHAVIORAL DISTURBANCE: ICD-10-CM

## 2022-11-08 DIAGNOSIS — F03.91 UNSPECIFIED DEMENTIA WITH BEHAVIORAL DISTURBANCE: ICD-10-CM

## 2022-11-08 PROCEDURE — 99214 OFFICE O/P EST MOD 30 MIN: CPT

## 2022-11-08 NOTE — ASSESSMENT
[FreeTextEntry1] : - Education, counseling, support provided today\par - Will complete FMLA forms \par \par Daughter unsure why Echo was cancelled?  Will clarify\par \par - WOuld benefit from formal help at home\par - c/w 24/7 supervision for safety and assistance w/ ADLs\par \par - Adv f/u with audiology for trial of HAs\par - f/u with optho reg adv \par - Fall precautions discussed\par - Adv to consider bathroom grab bars\par - c/w PT exercises regularly\par \par f/u with WILEY Hernandez for ADC program as per acuity protocol\par \par AWV at next visit \par Repeat labs at next visit \par \par f/u in 3 mo, unless earlier PRN \par

## 2022-11-08 NOTE — REASON FOR VISIT
[Follow-Up] : a follow-up visit [FreeTextEntry1] : dementia, forms completion [FreeTextEntry3] : anaid Smith  [FreeTextEntry2] : who assist with history d/t pt limited historian d/t baseline memory loss

## 2022-11-08 NOTE — PHYSICAL EXAM
[No Clubbing, Cyanosis] : no clubbing or cyanosis of the fingernails [Involuntary Movements] : no involuntary movements were seen [Motor Tone] : muscle strength and tone were normal [Normal] : normal skin color and pigmentation [No Focal Deficits] : no focal deficits [Normal Affect] : the affect was normal [Normal Mood] : the mood was normal [Normal Hearing] : hearing was not normal [Normal Gait] : abnormal gait [Normal Insight/Judgment] : insight and judgment were not intact [de-identified] : KAYLAH [de-identified] : hesitates when walking, slow cautious gait  [de-identified] : confused [de-identified] : calm, cooperative

## 2022-11-08 NOTE — HISTORY OF PRESENT ILLNESS
[Patient reported hearing was abnormal] : Patient reported hearing was abnormal [Patient declined colon rectal/cancer screening] : Patient declined colon/rectal cancer screening [Patient declined breast sonogram] : Patient declined breast sonogram [Patient declined cervical cancer screening] : Patient declined cervical cancer screening [One fall no injury in past year] : Patient reported one fall in the past year without injury [Independent] : transferring/mobility [Full assistance needed] : Assistance needed managing medications [] : Assistance needed managing finances. [Smoke Detector] : smoke detector [Carbon Monoxide Detector] : carbon monoxide detector [Grab Bars] : grab bars [Night Light] : night light [Wears Seat Belt] : wears seat belt [1] : 2) Feeling down, depressed, or hopeless for several days (1) [PHQ-2 Negative - No further assessment needed] : PHQ-2 Negative - No further assessment needed [FreeTextEntry1] : Seen by WILEY Purdy on 22/1/22 - visit note appreciated in EMR. \par \par TODAY pt denies complaints however limited historian d/t baseline confusion. \par \par Dtr requests forms to be completed for family leave. \par No other concerns at this time.  Traveling to [place] for a couple months - returning early January. \par \par Wishes to switch primary care. \par \par DEMENTIA / DEPRESSION / ANXIETY \par - 11/21: Dx: Forgetfullness started approx 2016.  Sometimes forgets family member names.  Forgets her age, dates, forgets where placed glasses few minutes ago. Slow progression over past several years. Sometimes left alone at home but not for more than 1 hr.  NO longer cooks but used to. \par \par - Appetite: fair, sometimes doesn't want to eat, gets boost daily\par - Motor Syx: chronic back pain, sometimes off balance and "shaky" when walking.  \par Ambulates unassisted. \par Last fall in 2/21 - when getting up to go to the bathroom in the middle of the night. No injury. \par \par - Sleep:  sleeps approx 10hrs/night for long time \par \par - MOCA 11/ 30 w/ WILEY Purdy on 11/1/22 \par - Previous cognitive testing: MoCA 7/30 on 9/8/21 w/ neuro Dr. Robles \par - Labs: reviewed in EMR from 9/21: Cr 1.53H\par - MRI HEAD 10/26/21: b/l scattered small vessel white matter ischemic changes. \par \par [x ] Neuro Dr. Melita Robles - pending appt \par [ ] Psych\par \par HTN / HLD / CAD s/p stent ~2013 \par - Followed w/ cards Dr. Kulkarni. Seen by cards Dr. López on 5/18/22 -  c/w ASA, statin for know CAD s/p PCI, c/w BB, f/u TTE.  [PapSmeardate] : 6/21 [TextBox_31] : had hearing loss but was told would not benefit from HAs d/t might "make her more confused"  [BoneDensityDate] : 3/22  [TextBox_37] : follows w/ optho Dr. Ca Camacho regularly  [FreeTextEntry7] : had [employer] Covid vaccine x 2, previously had flu vaccien without complications [Guns at Home] : no guns at home [Driving Concerns] : not driving or driving without noted concerns [[facility]] : >12  [[id number]] : 2 [de-identified] :  BPSD: more fiesty than previous baseline - has always been very timid now more outspoken,  [AdvancecareDate] : 3/14/22 [FreeTextEntry4] : No HCP. . 6 children - Raudel, Moshe, Flo, Wilfrid, Naty, Harvinder.

## 2023-02-13 ENCOUNTER — NON-APPOINTMENT (OUTPATIENT)
Age: 88
End: 2023-02-13

## 2023-02-13 ENCOUNTER — APPOINTMENT (OUTPATIENT)
Dept: GERIATRICS | Facility: CLINIC | Age: 88
End: 2023-02-13

## 2023-02-13 ENCOUNTER — APPOINTMENT (OUTPATIENT)
Dept: CARDIOLOGY | Facility: CLINIC | Age: 88
End: 2023-02-13
Payer: MEDICARE

## 2023-02-13 VITALS
BODY MASS INDEX: 23.05 KG/M2 | HEIGHT: 64 IN | HEART RATE: 71 BPM | SYSTOLIC BLOOD PRESSURE: 153 MMHG | DIASTOLIC BLOOD PRESSURE: 75 MMHG | OXYGEN SATURATION: 100 % | WEIGHT: 135 LBS

## 2023-02-13 PROCEDURE — 99215 OFFICE O/P EST HI 40 MIN: CPT

## 2023-02-13 PROCEDURE — 93000 ELECTROCARDIOGRAM COMPLETE: CPT

## 2023-02-13 NOTE — DISCUSSION/SUMMARY
[EKG obtained to assist in diagnosis and management of assessed problem(s)] : EKG obtained to assist in diagnosis and management of assessed problem(s) [FreeTextEntry1] : Patient is an 87 year-old Black woman with multiple cardiovascular risk factors and known coronary artery disease, presents for evaluation of a LBBB seen on ECG.\par She has no acute cardiac complaints.\par She has dementia that is mild, but progressive. \par \par Continue ASA and statin therapy for patient with known coronary artery disease status post PCI.\par Continue beta-blocker.\par Monitor blood pressure and add antihypertensives as needed.\par \par Follow-up TTE to evaluate for ischemic cardiomyopathy.

## 2023-02-13 NOTE — REASON FOR VISIT
[Arrhythmia/ECG Abnorrmalities] : arrhythmia/ECG abnormalities [Coronary Artery Disease] : coronary artery disease [Other: _____] : [unfilled] [FreeTextEntry1] : February 2023 - Patient was in [place] in December and January. While she was there, she had chest pain and went to the hospital where she was told she had a heart attack. She was given clopidogrel 75 mg and sildenafil 50 mg as new prescriptions, but they have not been continued.\par She is currently maintained on sertraline 25 mg daily, ASA 81 mg daily, atorvastatin 20 mg daily, and metoprolol succinate 25 mg daily.\par

## 2023-02-13 NOTE — HISTORY OF PRESENT ILLNESS
[FreeTextEntry1] : Patient is an 87 year-old Black woman who has recently moved and is changing doctors. She has a known past medical history of hypertension, dyslipidemia, coronary artery disease status post PCI (2013), with known dementia, who was recently noted to have a LBBB on her ECG.\par She has no new cardiovascular complaints. \par She continues walking and dancing for exercise.

## 2023-02-13 NOTE — CARDIOLOGY SUMMARY
[de-identified] : 3/14/2022, sinus rhythm, 68 bpm, LBBB\par 5/18/2022, sinus rhythm, 69 bpm, LBBB [de-identified] : 1/9/2023 in [place] - moderate MR, moderate-severe TR, pulmonary hypertension, septal and lateral wall motion abnormality, LVEF 35%

## 2023-03-03 ENCOUNTER — APPOINTMENT (OUTPATIENT)
Dept: CARDIOLOGY | Facility: CLINIC | Age: 88
End: 2023-03-03
Payer: MEDICARE

## 2023-03-03 PROCEDURE — 93306 TTE W/DOPPLER COMPLETE: CPT

## 2023-03-24 ENCOUNTER — APPOINTMENT (OUTPATIENT)
Dept: GERIATRICS | Facility: CLINIC | Age: 88
End: 2023-03-24
Payer: MEDICARE

## 2023-03-24 ENCOUNTER — NON-APPOINTMENT (OUTPATIENT)
Age: 88
End: 2023-03-24

## 2023-03-24 ENCOUNTER — APPOINTMENT (OUTPATIENT)
Dept: CARDIOLOGY | Facility: CLINIC | Age: 88
End: 2023-03-24
Payer: MEDICARE

## 2023-03-24 VITALS
OXYGEN SATURATION: 96 % | SYSTOLIC BLOOD PRESSURE: 146 MMHG | HEIGHT: 64 IN | BODY MASS INDEX: 23.73 KG/M2 | HEART RATE: 105 BPM | DIASTOLIC BLOOD PRESSURE: 87 MMHG | WEIGHT: 139 LBS

## 2023-03-24 VITALS
HEART RATE: 107 BPM | WEIGHT: 138 LBS | BODY MASS INDEX: 23.69 KG/M2 | DIASTOLIC BLOOD PRESSURE: 90 MMHG | RESPIRATION RATE: 22 BRPM | SYSTOLIC BLOOD PRESSURE: 142 MMHG | TEMPERATURE: 97.8 F | OXYGEN SATURATION: 97 %

## 2023-03-24 DIAGNOSIS — E55.9 VITAMIN D DEFICIENCY, UNSPECIFIED: ICD-10-CM

## 2023-03-24 DIAGNOSIS — R00.0 TACHYCARDIA, UNSPECIFIED: ICD-10-CM

## 2023-03-24 DIAGNOSIS — R73.9 HYPERGLYCEMIA, UNSPECIFIED: ICD-10-CM

## 2023-03-24 DIAGNOSIS — Z11.59 ENCOUNTER FOR SCREENING FOR OTHER VIRAL DISEASES: ICD-10-CM

## 2023-03-24 DIAGNOSIS — Z13.820 ENCOUNTER FOR SCREENING FOR OSTEOPOROSIS: ICD-10-CM

## 2023-03-24 DIAGNOSIS — Z13.21 ENCOUNTER FOR SCREENING FOR NUTRITIONAL DISORDER: ICD-10-CM

## 2023-03-24 DIAGNOSIS — Z12.83 ENCOUNTER FOR SCREENING FOR MALIGNANT NEOPLASM OF SKIN: ICD-10-CM

## 2023-03-24 DIAGNOSIS — M54.9 DORSALGIA, UNSPECIFIED: ICD-10-CM

## 2023-03-24 DIAGNOSIS — G89.29 DORSALGIA, UNSPECIFIED: ICD-10-CM

## 2023-03-24 PROCEDURE — 99214 OFFICE O/P EST MOD 30 MIN: CPT

## 2023-03-24 PROCEDURE — 93000 ELECTROCARDIOGRAM COMPLETE: CPT

## 2023-03-24 PROCEDURE — 93000 ELECTROCARDIOGRAM COMPLETE: CPT | Mod: 59

## 2023-03-24 PROCEDURE — 99215 OFFICE O/P EST HI 40 MIN: CPT | Mod: 25

## 2023-03-24 RX ORDER — SERTRALINE 25 MG/1
25 TABLET, FILM COATED ORAL DAILY
Qty: 30 | Refills: 4 | Status: DISCONTINUED | COMMUNITY
Start: 2022-01-18 | End: 2023-03-24

## 2023-03-24 NOTE — ASSESSMENT
[FreeTextEntry1] : New tachycardia in setting of chest "heaviness" - new complaint per dtr, decreased appetite today and feeling unwell when woke up this morning. \par Recent cards visit, labs and TTE reviewed in EMR. \par EKG done and reviewed today - tachy @ 107, LBBB - baseline\par \par Case discussed with cardiologist Dr. Egan - covering for Dr. Anand López. Dr. Egan has very kindly agreed to see patient at 12:40pm today for an urgent evaluation. \par \par We ordered blood work - dtr will take pt to [facility] on the way to cardiologists office to complete - f/u. \par \par Tylenol for chronic LBP - no more than 3grams/day\par \par Pt is at high risk for further complications and hospitalization. \par - Education, counseling, support provided today\par \par Would consider trial restart antidepressant in the future -hold off for now in setting of cardiac eval/complaints. \par \par - WOuld benefit from formal help at home\par - c/w 24/7 supervision for safety and assistance w/ ADLs\par - Referred to SW for additional counseling, education, support, resources\par \par f/u for further GOCD at next visit \par \par f/u with WILEY Hernandez for ADC program as rec \par AWV\par \par f/u after cards eval, unless earlier PRN \par

## 2023-03-24 NOTE — REASON FOR VISIT
[Follow-Up] : a follow-up visit [Family Member] : family member [FreeTextEntry1] : chest heaviness, dementia [FreeTextEntry3] : anaid Smith  [FreeTextEntry2] : who assists with history d/t pt limited historian d/t baseline memory loss

## 2023-03-24 NOTE — PHYSICAL EXAM
[No Clubbing, Cyanosis] : no clubbing or cyanosis of the fingernails [Involuntary Movements] : no involuntary movements were seen [Motor Tone] : muscle strength and tone were normal [No Focal Deficits] : no focal deficits [Normal Affect] : the affect was normal [Normal Mood] : the mood was normal [Normal] : the sclera and conjunctiva were normal, extraocular movements were intact, pupils were equal in size, round, and reactive to light [Normal Hearing] : hearing was not normal [Normal Gait] : abnormal gait [Normal Insight/Judgment] : insight and judgment were not intact [de-identified] : KAYLAH [de-identified] : slow cautious gait  [de-identified] : confused [de-identified] : calm, cooperative

## 2023-03-24 NOTE — HISTORY OF PRESENT ILLNESS
[Patient reported hearing was abnormal] : Patient reported hearing was abnormal [Patient declined colon rectal/cancer screening] : Patient declined colon/rectal cancer screening [Patient declined breast sonogram] : Patient declined breast sonogram [Patient declined cervical cancer screening] : Patient declined cervical cancer screening [One fall no injury in past year] : Patient reported one fall in the past year without injury [Independent] : transferring/mobility [Full assistance needed] : Assistance needed managing medications [Smoke Detector] : smoke detector [Carbon Monoxide Detector] : carbon monoxide detector [Grab Bars] : grab bars [Night Light] : night light [Wears Seat Belt] : wears seat belt [Completely Dependent] : Completely dependent. [FAST Score: ____] : Functional Assessment Scale (FAST) Score: [unfilled] [Other reason not done] : Other reason not done [Mild] : Stage: Mild [Stable] : Status: Stable [Memory Lapses Or Loss] : stable memory impairment [Patient Observed To Be Agitated] : denies agitation [Hostility Toward Caregivers] : denies aggression [Sleep Disturbances] : denies sleep disturbances [] : denies wandering [Fixed Beliefs Contradicted By Reality (Delusions)] : denies delusions [Difficulty Finding Desired Words] : denies difficulty finding desired words [Reviewed no changes] : Reviewed, no changes [Designated Healthcare Proxy] : Designated healthcare proxy [FreeTextEntry1] : Seen by cards Dr. López on 2/13/23 - visit note appreciated in EMR\par \par TODAY patient reports chest heaviness and LBP. Patient is a poor historian d/t baseline memory loss. Dtr helps with history.\par \par LBP is chronic intermittent - currently tolerable/mild.\par \par Dtr reports pt reported feeling unwell when woke up this morning but was unable to describe further. On the way to our office pt c/o chest "heaviness" which lasted approx 30 mins.  Now the chest heaviness is resolved per pt. \par Dtr adds that pt gets anxious occasionally which is a/w SOB - this is on/off - possibly worse in past few months. \par \par Tapered off low dose Zoloft 25mg in past few months because symptoms were improved per dtr. \par \par Recently returned from vacation in [place] for past several months. Dtr states pt was having chest discomfort while in [place] and was evaluated there and told that she had a heart attack but when she came back to [place] and had blood work was told that she didn't have a heart attack per dtr.\par \par TTE on 3/3/23 completed - report reviewed in EMR. \par Dtr states that her daughter (granddaughter) may have discussed results with cardiologist but did not mention to her that there was anythign abnormal.  \par \par - Appetite: poor today - this is new per dtr \par \par - Motor Syx: chronic back pain, sometimes off balance and "shaky" when walking.  \par Ambulates unassisted. \par Last fall in 2/21 - when getting up to go to the bathroom in the middle of the night. No injury. \par \par - Sleep:  denies problems, sleeps approx 10hrs/night \par \par DEMENTIA / DEPRESSION / ANXIETY \par - 11/21: Dx: Forgetfullness started approx 2016.  Sometimes forgets family member names.  Forgets her age, dates, forgets where placed glasses few minutes ago. Slow progression over past several years. Sometimes left alone at home but not for more than 1 hr.  NO longer cooks but used to. \par \par - MOCA 11/ 30 w/ NP Gurwinder on 11/1/22 \par - Previous cognitive testing: MoCA 7/30 on 9/8/21 w/ neuro Dr. Robles \par - Labs: Cr 1.53H\par - MRI HEAD 10/26/21: b/l scattered small vessel white matter ischemic changes. \par \par [x ] Neuro Dr. Melita Robles\par [ ] Psych\par \par HTN / HLD / CAD s/p PCI w/ stent ~2013 \par - Follows w/ cards Dr. López\par - on ASA, statin, BB  [PapSmeardate] : 6/21 [TextBox_31] : had hearing loss but was told would not benefit from HAs d/t might "make her more confused"  [BoneDensityDate] : 3/22  [TextBox_37] : follows w/ optho Dr. Ca Camacho regularly  [FreeTextEntry7] : had [employer] Covid vaccine x 2, previously had flu vaccien without complications [Guns at Home] : no guns at home [Driving Concerns] : not driving or driving without noted concerns [facility] : >12  [de-identified] : PHQ2 of 2 on 11/8/22  [AdvancecareDate] : 3/24/23 [FreeTextEntry4] : HCP form on file: primary HCP is dtr Sarah, alternate is granddaughter Justa. \par . 6 children - Raudel, Moshe, Flo, Wilfrid, Sarah, Harvinder\par MOLST not on file

## 2023-03-25 LAB
25(OH)D3 SERPL-MCNC: 37.5 NG/ML
ALBUMIN SERPL ELPH-MCNC: 4.6 G/DL
ALP BLD-CCNC: 84 U/L
ALT SERPL-CCNC: 101 U/L
ANION GAP SERPL CALC-SCNC: 18 MMOL/L
AST SERPL-CCNC: 86 U/L
BASOPHILS # BLD AUTO: 0.04 K/UL
BASOPHILS NFR BLD AUTO: 0.9 %
BILIRUB SERPL-MCNC: 0.7 MG/DL
BUN SERPL-MCNC: 18 MG/DL
CALCIUM SERPL-MCNC: 10.3 MG/DL
CHLORIDE SERPL-SCNC: 105 MMOL/L
CHOLEST SERPL-MCNC: 142 MG/DL
CO2 SERPL-SCNC: 21 MMOL/L
COVID-19 NUCLEOCAPSID  GAM ANTIBODY INTERPRETATION: POSITIVE
CREAT SERPL-MCNC: 1.46 MG/DL
EGFR: 35 ML/MIN/1.73M2
EOSINOPHIL # BLD AUTO: 0.01 K/UL
EOSINOPHIL NFR BLD AUTO: 0.2 %
ESTIMATED AVERAGE GLUCOSE: 117 MG/DL
FOLATE SERPL-MCNC: >20 NG/ML
GLUCOSE SERPL-MCNC: 125 MG/DL
HBA1C MFR BLD HPLC: 5.7 %
HCT VFR BLD CALC: 40.7 %
HDLC SERPL-MCNC: 63 MG/DL
HGB BLD-MCNC: 12.3 G/DL
IMM GRANULOCYTES NFR BLD AUTO: 0.2 %
LDLC SERPL CALC-MCNC: 60 MG/DL
LYMPHOCYTES # BLD AUTO: 0.92 K/UL
LYMPHOCYTES NFR BLD AUTO: 20.8 %
MAN DIFF?: NORMAL
MCHC RBC-ENTMCNC: 26.5 PG
MCHC RBC-ENTMCNC: 30.2 GM/DL
MCV RBC AUTO: 87.7 FL
MONOCYTES # BLD AUTO: 0.39 K/UL
MONOCYTES NFR BLD AUTO: 8.8 %
NEUTROPHILS # BLD AUTO: 3.06 K/UL
NEUTROPHILS NFR BLD AUTO: 69.1 %
NONHDLC SERPL-MCNC: 79 MG/DL
NT-PROBNP SERPL-MCNC: ABNORMAL PG/ML
PLATELET # BLD AUTO: 255 K/UL
POTASSIUM SERPL-SCNC: 4.5 MMOL/L
PROT SERPL-MCNC: 7.2 G/DL
RBC # BLD: 4.64 M/UL
RBC # FLD: 14.6 %
SARS-COV-2 AB SERPL QL IA: 109 INDEX
SODIUM SERPL-SCNC: 143 MMOL/L
TRIGL SERPL-MCNC: 94 MG/DL
TSH SERPL-ACNC: 2.3 UIU/ML
VIT B12 SERPL-MCNC: 1565 PG/ML
WBC # FLD AUTO: 4.43 K/UL

## 2023-03-26 ENCOUNTER — NON-APPOINTMENT (OUTPATIENT)
Age: 88
End: 2023-03-26

## 2023-03-29 ENCOUNTER — APPOINTMENT (OUTPATIENT)
Dept: NEUROLOGY | Facility: CLINIC | Age: 88
End: 2023-03-29
Payer: MEDICARE

## 2023-03-29 VITALS
DIASTOLIC BLOOD PRESSURE: 81 MMHG | HEART RATE: 92 BPM | BODY MASS INDEX: 23.56 KG/M2 | HEIGHT: 64 IN | SYSTOLIC BLOOD PRESSURE: 134 MMHG | WEIGHT: 138 LBS | OXYGEN SATURATION: 97 %

## 2023-03-29 PROCEDURE — 99214 OFFICE O/P EST MOD 30 MIN: CPT

## 2023-03-29 NOTE — HISTORY OF PRESENT ILLNESS
[FreeTextEntry1] : INTERIM HX 03/29/2023: last seen 9/2021. Been followed by [facility] program for dementia. Accompanied by Niece and granddaughter. sahil reports she has been "on and off"- cognitive and physical. Some days she can remember more. Needs to reminded daily of the date and day. Knows she is in [place]. Recalls immediate family members by face and name. Never got hearing aids. No behavioral symptoms. Sleeping okay. She has severe idiopathic cardiomyopathy (new diagnosis), experiencing intermittent chest pains and SOB, and seeing cardiologist, may need pacemaker., recently started 2 new meds for heart. she also has sinus tach and RRB. she lives with daughter and her family. She does not drive. She can dress and feed herself, bathe herself. family manage meds, made a mistake.\par ---------------------------------------------------\par HPI (initial visit Sep 08, 2021)- Gloria is a 86 year old RH female with hx of HTN, HLD, CAD s/p stent,  referred for hx of memory loss. She is accompanied by granddaughter, Fela. \par \par Hx obtained from pt:- "I am confused". "I don't know why I am here". She is aware she is in a doctors clinic. She tells me she lives in a house in [place] with family- granddaughter, daughter and her . She could not recall what her occupation was in the past or when she retired. 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 When asked how many children she has, she counts with her fingers and says "6"-2 girls and 4 boys. 2 boys in [place] and 2 in the [facility] ([place]). "Memory is not to good, I forget". "I used to cook". Family cooks for her. She does not drive. Does not know why we are wearing masks and does not know current president.\par \par Hx obtained from granddaughter-Memory issues x 3-4 years. She had a cognitive evaluation through insurance and did poorly. She forgets conversations in 5 minutes. She cannot stay alone at home, she gets confused. She corrects pt and says they lives in [place] and not [place]. She experiences sundowning at night and gets confused if she lives house at night. She remembers names of family members she frequently meets. She [place] in [place] in calles and summers she is in [place], travels back and forth. Someone is almost always with her at home. She is able to feed and dress herself. She can fold clothes, but not do laundry. She takes her own medications. She has daily routine. no hallucinations. no paranoia. no personality changes. She gets tearful easily for no particular reason. \par \par She had a sister with "dementia".

## 2023-03-29 NOTE — ASSESSMENT
[FreeTextEntry1] : Assessment/Plan:\par  87 year old female with hx of progressive cognitive decline since 2017/2018 which interferes with daily activities, consistent with diagnosis of dementia. \par \par Dementia without behavioral disturbance- most likely neurodegenerative dementia, however given mild-mod microvascular ischemic changes on brain MRI and significant executive dysfunction on cognitive screening evaluation, a vascular dementia or event mixed vascular and neurodegenerative dementia is also a possibility. \par \par \par Plan:-\par - Will order FDG PET scan to differentiate between AD and FTD, to help guide with treatment planning. I will call family back with results of PET scan. \par - Hold off on symptomatic treatment at this time. Reviewed options of treating with donepezil or memantine. These medications are only FDA approved for AD. Would need to confirm if patient has AD. Would need donepezil cleared by cardiologist (could cause -bradycardia, atrioventricular block).\par \par Counselled on the diagnosis of neurodegenerative dementia, and reviewed its natural history. As the disease progresses, there is increasing impairment in cognitive functioning, including memory, language, and executive functioning. Behavioral changes also progress as the disease advances, as does one’s ability to function independently; eventually requiring full time assistance with daily needs. Counselled on safety and future planning. There is no curative treatment, however there are therapies available that can help temporarily mitigate cognitive symptoms of AD, but do not halt the overall progression of disease. Discussed strategies for maintaining cognitive health (encouraged healthy eating habits, routine physical exercise, social interaction and cognitive stimulations (reading, word puzzles)). Counselled on management of vascular risk factors. \par \par \par Return to clinic 6 months, or sooner if needed\par \par Melita Robles M.D\par

## 2023-03-29 NOTE — PHYSICAL EXAM
[Total Score ___ / 30] : the patient achieved a score of [unfilled] /30 [Date / Time ___ / 5] : date / time [unfilled] / 5 [Place ___ / 5] : place [unfilled] / 5 [Registration ___ / 3] : registration [unfilled] / 3 [Serial Sevens ___/5] : serial sevens [unfilled] / 5 [Naming 2 Objects ___ / 2] : naming two objects [unfilled] / 2 [Repeating a Sentence ___ / 1] : repeating a sentence [unfilled] / 1 [Writing a Sentence ___ / 1] : write sentence [unfilled] / 1 [3-stage Verbal Command ___ / 3] : three-stage verbal command [unfilled] / 3 [Written Command ___ / 1] : written command [unfilled] / 1 [Copy a Design ___ / 1] : copy a design [unfilled] / 1 [Recall ___ / 3] : recall [unfilled] / 3 [FreeTextEntry1] : 9/8/2021 MOCA 7/30 \par \par 3/24/2023 MMSE 9/30\par \par Alert, in no distress, calm\par no facial asymmetry\par no dysarthria\par anomia in conversation, says "i don't know" a lot\par follows all one step commands, follows 2 step commands inconsistently (could be 2/2 hearing impairment)\par Hearing impaired\par No tremors\par No drift in UE\par Moving all extremities symmetrically\par Gait is mildly unsteady (mild shaking of legs), mild decreased arm swing on the left, normal turning

## 2023-04-01 ENCOUNTER — INPATIENT (INPATIENT)
Facility: HOSPITAL | Age: 88
LOS: 3 days | Discharge: HOME HEALTH | End: 2023-04-05
Attending: INTERNAL MEDICINE | Admitting: INTERNAL MEDICINE
Payer: MEDICARE

## 2023-04-01 VITALS
SYSTOLIC BLOOD PRESSURE: 149 MMHG | RESPIRATION RATE: 20 BRPM | OXYGEN SATURATION: 100 % | HEART RATE: 83 BPM | DIASTOLIC BLOOD PRESSURE: 93 MMHG

## 2023-04-01 DIAGNOSIS — R07.9 CHEST PAIN, UNSPECIFIED: ICD-10-CM

## 2023-04-01 LAB
ALBUMIN SERPL ELPH-MCNC: 4.3 G/DL — SIGNIFICANT CHANGE UP (ref 3.3–5)
ALP SERPL-CCNC: 105 U/L — SIGNIFICANT CHANGE UP (ref 40–120)
ALT FLD-CCNC: 92 U/L — HIGH (ref 4–33)
ANION GAP SERPL CALC-SCNC: 14 MMOL/L — SIGNIFICANT CHANGE UP (ref 7–14)
APTT BLD: 30.8 SEC — SIGNIFICANT CHANGE UP (ref 27–36.3)
AST SERPL-CCNC: 67 U/L — HIGH (ref 4–32)
BASE EXCESS BLDV CALC-SCNC: -1.7 MMOL/L — SIGNIFICANT CHANGE UP (ref -2–3)
BASOPHILS # BLD AUTO: 0.04 K/UL — SIGNIFICANT CHANGE UP (ref 0–0.2)
BASOPHILS NFR BLD AUTO: 1 % — SIGNIFICANT CHANGE UP (ref 0–2)
BILIRUB SERPL-MCNC: 0.5 MG/DL — SIGNIFICANT CHANGE UP (ref 0.2–1.2)
BUN SERPL-MCNC: 19 MG/DL — SIGNIFICANT CHANGE UP (ref 7–23)
CA-I SERPL-SCNC: 1.19 MMOL/L — SIGNIFICANT CHANGE UP (ref 1.15–1.33)
CALCIUM SERPL-MCNC: 9.4 MG/DL — SIGNIFICANT CHANGE UP (ref 8.4–10.5)
CHLORIDE BLDV-SCNC: 109 MMOL/L — HIGH (ref 96–108)
CHLORIDE SERPL-SCNC: 108 MMOL/L — HIGH (ref 98–107)
CK MB BLD-MCNC: 1.4 % — SIGNIFICANT CHANGE UP (ref 0–2.5)
CK MB CFR SERPL CALC: 2.7 NG/ML — SIGNIFICANT CHANGE UP
CK SERPL-CCNC: 189 U/L — HIGH (ref 25–170)
CO2 BLDV-SCNC: 26.7 MMOL/L — HIGH (ref 22–26)
CO2 SERPL-SCNC: 21 MMOL/L — LOW (ref 22–31)
CREAT SERPL-MCNC: 1.54 MG/DL — HIGH (ref 0.5–1.3)
EGFR: 32 ML/MIN/1.73M2 — LOW
EOSINOPHIL # BLD AUTO: 0.06 K/UL — SIGNIFICANT CHANGE UP (ref 0–0.5)
EOSINOPHIL NFR BLD AUTO: 1.5 % — SIGNIFICANT CHANGE UP (ref 0–6)
FLUAV AG NPH QL: SIGNIFICANT CHANGE UP
FLUBV AG NPH QL: SIGNIFICANT CHANGE UP
GAS PNL BLDV: 141 MMOL/L — SIGNIFICANT CHANGE UP (ref 136–145)
GAS PNL BLDV: SIGNIFICANT CHANGE UP
GAS PNL BLDV: SIGNIFICANT CHANGE UP
GLUCOSE BLDV-MCNC: 116 MG/DL — HIGH (ref 70–99)
GLUCOSE SERPL-MCNC: 129 MG/DL — HIGH (ref 70–99)
HCO3 BLDV-SCNC: 25 MMOL/L — SIGNIFICANT CHANGE UP (ref 22–29)
HCT VFR BLD CALC: 39.7 % — SIGNIFICANT CHANGE UP (ref 34.5–45)
HCT VFR BLDA CALC: 36 % — SIGNIFICANT CHANGE UP (ref 34.5–46.5)
HGB BLD CALC-MCNC: 12 G/DL — SIGNIFICANT CHANGE UP (ref 11.7–16.1)
HGB BLD-MCNC: 11.9 G/DL — SIGNIFICANT CHANGE UP (ref 11.5–15.5)
IANC: 1.77 K/UL — LOW (ref 1.8–7.4)
IMM GRANULOCYTES NFR BLD AUTO: 0.3 % — SIGNIFICANT CHANGE UP (ref 0–0.9)
INR BLD: 1.05 RATIO — SIGNIFICANT CHANGE UP (ref 0.88–1.16)
LACTATE BLDV-MCNC: 2.2 MMOL/L — HIGH (ref 0.5–2)
LYMPHOCYTES # BLD AUTO: 1.6 K/UL — SIGNIFICANT CHANGE UP (ref 1–3.3)
LYMPHOCYTES # BLD AUTO: 41 % — SIGNIFICANT CHANGE UP (ref 13–44)
MCHC RBC-ENTMCNC: 26 PG — LOW (ref 27–34)
MCHC RBC-ENTMCNC: 30 GM/DL — LOW (ref 32–36)
MCV RBC AUTO: 86.9 FL — SIGNIFICANT CHANGE UP (ref 80–100)
MONOCYTES # BLD AUTO: 0.42 K/UL — SIGNIFICANT CHANGE UP (ref 0–0.9)
MONOCYTES NFR BLD AUTO: 10.8 % — SIGNIFICANT CHANGE UP (ref 2–14)
NEUTROPHILS # BLD AUTO: 1.77 K/UL — LOW (ref 1.8–7.4)
NEUTROPHILS NFR BLD AUTO: 45.4 % — SIGNIFICANT CHANGE UP (ref 43–77)
NRBC # BLD: 0 /100 WBCS — SIGNIFICANT CHANGE UP (ref 0–0)
NRBC # FLD: 0.02 K/UL — HIGH (ref 0–0)
NT-PROBNP SERPL-SCNC: HIGH PG/ML
PCO2 BLDV: 50 MMHG — SIGNIFICANT CHANGE UP (ref 39–52)
PH BLDV: 7.31 — LOW (ref 7.32–7.43)
PLATELET # BLD AUTO: 222 K/UL — SIGNIFICANT CHANGE UP (ref 150–400)
PO2 BLDV: 28 MMHG — SIGNIFICANT CHANGE UP (ref 25–45)
POTASSIUM BLDV-SCNC: 4.4 MMOL/L — SIGNIFICANT CHANGE UP (ref 3.5–5.1)
POTASSIUM SERPL-MCNC: 4.5 MMOL/L — SIGNIFICANT CHANGE UP (ref 3.5–5.3)
POTASSIUM SERPL-SCNC: 4.5 MMOL/L — SIGNIFICANT CHANGE UP (ref 3.5–5.3)
PROT SERPL-MCNC: 7 G/DL — SIGNIFICANT CHANGE UP (ref 6–8.3)
PROTHROM AB SERPL-ACNC: 12.2 SEC — SIGNIFICANT CHANGE UP (ref 10.5–13.4)
RBC # BLD: 4.57 M/UL — SIGNIFICANT CHANGE UP (ref 3.8–5.2)
RBC # FLD: 14.7 % — HIGH (ref 10.3–14.5)
RSV RNA NPH QL NAA+NON-PROBE: SIGNIFICANT CHANGE UP
SAO2 % BLDV: 35.5 % — LOW (ref 67–88)
SARS-COV-2 RNA SPEC QL NAA+PROBE: SIGNIFICANT CHANGE UP
SODIUM SERPL-SCNC: 143 MMOL/L — SIGNIFICANT CHANGE UP (ref 135–145)
TROPONIN T, HIGH SENSITIVITY RESULT: 27 NG/L — SIGNIFICANT CHANGE UP
WBC # BLD: 3.9 K/UL — SIGNIFICANT CHANGE UP (ref 3.8–10.5)
WBC # FLD AUTO: 3.9 K/UL — SIGNIFICANT CHANGE UP (ref 3.8–10.5)

## 2023-04-01 PROCEDURE — 99285 EMERGENCY DEPT VISIT HI MDM: CPT

## 2023-04-01 PROCEDURE — 99223 1ST HOSP IP/OBS HIGH 75: CPT

## 2023-04-01 PROCEDURE — 71045 X-RAY EXAM CHEST 1 VIEW: CPT | Mod: 26

## 2023-04-01 RX ORDER — LIDOCAINE 4 G/100G
1 CREAM TOPICAL ONCE
Refills: 0 | Status: COMPLETED | OUTPATIENT
Start: 2023-04-01 | End: 2023-04-01

## 2023-04-01 RX ORDER — ACETAMINOPHEN 500 MG
1000 TABLET ORAL ONCE
Refills: 0 | Status: COMPLETED | OUTPATIENT
Start: 2023-04-01 | End: 2023-04-01

## 2023-04-01 RX ORDER — TICAGRELOR 90 MG/1
180 TABLET ORAL ONCE
Refills: 0 | Status: COMPLETED | OUTPATIENT
Start: 2023-04-01 | End: 2023-04-01

## 2023-04-01 RX ORDER — FUROSEMIDE 40 MG
40 TABLET ORAL ONCE
Refills: 0 | Status: COMPLETED | OUTPATIENT
Start: 2023-04-01 | End: 2023-04-01

## 2023-04-01 RX ADMIN — Medication 400 MILLIGRAM(S): at 22:37

## 2023-04-01 RX ADMIN — LIDOCAINE 1 PATCH: 4 CREAM TOPICAL at 22:36

## 2023-04-01 RX ADMIN — TICAGRELOR 180 MILLIGRAM(S): 90 TABLET ORAL at 21:10

## 2023-04-01 NOTE — ED PROVIDER NOTE - ATTENDING CONTRIBUTION TO CARE
87-year-old female history of CHF, CAD status post 1 stent, hypertension, presenting as code STEMI.  patient brought in by EMS.   Chest pain started 45 minutes ago, located midsternally, not improved after receiving 1 of nitroglycerin.  Patient was also loaded with 324 aspirin.  Patient noted to have ST elevations in the anterior leads, V1 through V4 with reciprocal changes.   Code STEMI called, EKG emailed to [email or web address] and images sent to cath attending on call.   No fevers or chills.

## 2023-04-01 NOTE — ED PROVIDER NOTE - OBJECTIVE STATEMENT
87-year-old female history of CHF, CAD status post 1 stent, hypertension, presenting as code STEMI.  patient brought in by EMS.   Chest pain started 45 minutes ago, located midsternally, not improved after receiving 1 of nitroglycerin.  Patient was also loaded with 324 aspirin.  Patient noted to have ST elevations in the anterior leads, V1 through V4 with reciprocal changes.   Code STEMI called, EKG emailed to [email or web address] and images sent to cath attending on call.   No fevers or chills.    cardiologist: Claudio Egan

## 2023-04-01 NOTE — ED ADULT NURSE NOTE - OBJECTIVE STATEMENT
MALIK RN: Pt received to room 22. Pt A&Ox4, arrives as STEMI notification. Pt c/o L sided CP x approx 45 minutes. Pt received nitro x1 via EMS and 324 of ASA. Pt reports CP was unrelieved by nitro. Pt with pmhx of 1 stent, CHF, HTN. Pt arrives on 2L NC, satting 97%.. Rpt EKG performed. Pt placed on cardiac monitor. VSS. Pt arrives with 20G to R AC, additional access obtained with 20G to L AC, labs collected and sent as per orders. Awaiting further orders. Safety measures in place, pt stable upon exiting room. Primary RN made aware. MALIK RN: Pt received to room 22. Pt A&Ox4, arrives as STEMI notification. Pt c/o L sided CP x approx 45 minutes. Pt also reporting back pain, denies any dizziness, headache. Pt received nitro x1 via EMS and 324 of ASA. Pt reports CP was unrelieved by nitro. Pt with pmhx of 1 stent, CHF, HTN. Pt arrives on 2L NC, satting 97%. Rpt EKG performed. Pt placed on cardiac monitor. VSS. Pt arrives with 20G to R AC, additional access obtained with 20G to L AC, labs collected and sent as per orders. Awaiting further orders. Safety measures in place, pt stable upon exiting room. Primary RN made aware.

## 2023-04-01 NOTE — ED PROVIDER NOTE - CLINICAL SUMMARY MEDICAL DECISION MAKING FREE TEXT BOX
87-year-old female history of CHF, CAD status post 1 stent, hypertension, presenting as code STEMI. EKG NSR, ST V1-V4, +LBBB. Sgarbodssa criteria not met. LBBB old per chart review. S/P aspirin load 324 and NTG x 1. Will give brilinta. Code STEMI cancelled.

## 2023-04-01 NOTE — ED ADULT TRIAGE NOTE - NS ED NURSE AMBULANCES
Post-Care Instructions: After the procedure, take precautions agains sun exposure. Do not apply sunscreen for 12 hours after the procedure. Do not apply make-up for 12 hours after the procedure. Avoid alcohol based toners for 10-14 days. After 2-3 days patients can return to their regular skin regimen. Depth In Mm: 1.5 Length Of Topical Anesthesia Application (Optional): 20 minutes Treatment Number (Optional): 2 Consent: Written consent obtained, risks reviewed including but not limited to pain, scarring, infection and incomplete improvement. Patient understands the procedure is cosmetic in nature and will require out of pocket payment. [facility] Depth In Mm: 0. 75 Depth In Mm: 1 Price (Use Numbers Only, No Special Characters Or $): 1558 [facility] Detail Level: Zone

## 2023-04-01 NOTE — CONSULT NOTE ADULT - NS ATTEND AMEND GEN_ALL_CORE FT
Agree with IV Lasix.  Start losartan 12.5 mg po bid.  Obtain previous cardiac records, including any recent stress tests.

## 2023-04-01 NOTE — CONSULT NOTE ADULT - ASSESSMENT
ASSESSMENT AND PLAN  87F with pmhx of HFrEF (Ef 15-20% from echo on 3/3/23), CAD with 1 stent (2006), know LBBB, HTN, HLD, mild dementia, and ataxia present with c/o CP. Pt has been having intermittent chest tightness since Dec. 2022 as per Sarah simmons. CP started ~ 4pm, non-radiating and continued until EMS brought pt to the ED. EKG found with LBBB (seen with previous EKG), not meeting sgarbossa criteria. ED reached out to cardiology interventionalist, Dr. Angulo, and no urgent cath needed. Cardiology consult for CP.      Of note, Echo from 3/3/23 shows EF 15-20%, LVIDd 6.2, mod-sev MR, mild-mod AR, mod dilated LA, severe global LVSD, LVH, mod TR, severe pulm HTN.       PLAN  #Atypical CP- CP/chest tightness lasting ~ few hours, non-radiating, initial troponin 27, Scr 1.54, probnp 42811, EKG unchanged. CP improved. Less likely ACS, since the patient experienced the similar pain intermittently in past 3 months.   -h/o CAD with stent X1  -c/w asa, atorvastatin  -check A1c, TSH, lipids, LFTs  -repeat EKG in am and prn for CP  -repeat 2nd troponin/ck/ckmb  -c/w metoprolol 25mg BID    #HFrEF 	(Ef 15-20% from echo on 3/3/23)  -on exam, +RLL rales, +JVD, no LE edema, +dyspnea when lying flat.  -probnp 59916 (on 3/24/23 it was 11328)  -CXR with R pleural effusion  -give lasix 40mg Iv X1 now, and start 40mg iv BID tomorrow  -strict I's and O's   -echo in am  -hold entresto, Farxiga  -replete electrolyte to keep K>4, mg>2  -house cardiology will follow, call # [phone] for questions.    Case discussed with Dr. Sudha Mobley, cardiology fellow  Attending attestation to follow.     ASSESSMENT AND PLAN  87F with pmhx of HFrEF (Ef 15-20% from echo on 3/3/23), CAD with 1 stent (2006), know LBBB, HTN, HLD, mild dementia, and ataxia present with c/o CP. Pt has been having intermittent chest tightness since Dec. 2022 as per Sarah simmons. CP started ~ 4pm, non-radiating and continued until EMS brought pt to the ED. EKG found with LBBB (seen with previous EKG), not meeting sgarbossa criteria. ED reached out to cardiology interventionalist, Dr. Angulo, and no urgent cath needed. Cardiology consult for CP.      Of note, Echo from 3/3/23 shows EF 15-20%, LVIDd 6.2, mod-sev MR, mild-mod AR, mod dilated LA, severe global LVSD, LVH, mod TR, severe pulm HTN.       PLAN  #Atypical CP- CP/chest tightness lasting ~ few hours, non-radiating, initial troponin 27, Scr 1.54, probnp 05305, EKG unchanged. CP improved. Less likely ACS, since the patient experienced the similar pain/discomfort intermittently for past 3 months.   -h/o CAD with stent X1  -c/w asa, atorvastatin  -check A1c, TSH, lipids, LFTs  -repeat EKG in am and prn for CP  -repeat 2nd troponin/ck/ckmb  -c/w metoprolol 25mg BID    #HFrEF 	(Ef 15-20% from echo on 3/3/23)  -on exam, +RLL rales, +JVD, no LE edema, +dyspnea when lying flat.  -probnp 59952 (on 3/24/23 it was 39020)  -CXR with R pleural effusion  -give lasix 40mg Iv X1 now, and start 40mg iv BID tomorrow  -strict I's and O's   -echo in am  -hold entresto, Farxiga  -replete electrolyte to keep K>4, mg>2  -house cardiology will follow, call # [phone] for questions.    Case discussed with Dr. Sudha Mobley, cardiology fellow  Attending attestation to follow.     ASSESSMENT AND PLAN  87F with pmhx of HFrEF (Ef 15-20% from echo on 3/3/23), CAD with 1 stent (2006), know LBBB, HTN, HLD, mild dementia, and ataxia present with c/o CP. Pt has been having intermittent chest tightness since Dec. 2022 as per Sarah simmons. CP started ~ 4pm, non-radiating and continued until EMS brought pt to the ED. EKG found with LBBB (seen with previous EKG), not meeting sgarbossa criteria. ED reached out to cardiology interventionalist, Dr. Angulo, and no urgent cath needed. Cardiology consult for CP.      Of note, Echo from 3/3/23 shows EF 15-20%, LVIDd 6.2, mod-sev MR, mild-mod AR, mod dilated LA, severe global LVSD, LVH, mod TR, severe pulm HTN.       PLAN  #Atypical CP- CP/chest tightness lasting ~ few hours, non-radiating, initial troponin 27, Ck/CKMB 189/2.7, Scr 1.54, probnp 26130, EKG unchanged. CP improved. Less likely ACS, since the patient experienced the similar pain/discomfort intermittently for past 3 months.   -h/o CAD with stent X1  -c/w asa, atorvastatin  -check A1c, TSH, lipids, LFTs  -repeat EKG in am and prn for CP  -repeat 2nd troponin/ck/ckmb  -c/w metoprolol 25mg BID    #HFrEF 	(Ef 15-20% from echo on 3/3/23)  -on exam, +RLL rales, +JVD, no LE edema, +dyspnea when lying flat.  -probnp 14872 (on 3/24/23 it was 60395)  -CXR with R pleural effusion  -give lasix 40mg Iv X1 now, and start 40mg iv BID tomorrow  -strict I's and O's   -echo in am  -hold entresto, Farxiga  -replete electrolyte to keep K>4, mg>2  -house cardiology will follow, call # [phone] for questions.    Case discussed with Dr. Sudha Mobley, cardiology fellow  Attending attestation to follow.

## 2023-04-01 NOTE — ED PROVIDER NOTE - PROGRESS NOTE DETAILS
Joan SELLERS PGY-3: patient saved under different MRN, LBBB old Joan SELLERS PGY-3:  patient evaluated by cardiology NP, who will reach out to fellow then call back with formal recommendations.  Awaiting final recs from cardiology.

## 2023-04-01 NOTE — CONSULT NOTE ADULT - SUBJECTIVE AND OBJECTIVE BOX
CC:  Patient is a 87y old  Female who presents with a chief complaint of CP  HPI:  87F with pmhx of HFrEF (Ef 15-20% from echo on 3/3/23), CAD with 1 stent (2006), LBBB, HTN, HLD, mild dementia (a&o X2), and ataxia present with c/o CP. Patient is forgetful and is poor historian, so collateral information gathered from the daughter, Sarah. As per the daughter, patient was crying and c/o chest tightness around 4pm today, so called EMS and brought her to ED. As per the daughter, since Dec 2022, patient has been c/o intermittent CP/tightness, and dyspnea. Pt's cardiologist is Dr. Anand López. The daughter took the to card f/u on 3/24/23, (saw Dr. Egan) was started on Entresto 24/26, Farxiga 10mg, and inc metoprolol to 50mg from 25mg, also had a discussion of possible BIV ppm in the future. Pt denies palpitations, lightheadedness, n/v/d, syncope, fever or chills.    In Ed, /93, HR 83, RR20, o2sat 100% on 2 liters n/c, EKG shows NSR VR 90, LBBB, LVH, CXR with small right pleural effusion associated with atelectasis, proBNP 30278, troponin 27, Scr 1.54, VBG lactate 2.2. ED provider contacted Dr. Angulo, cardiology interventionalist with a concern for possible cath. However, pt has known LBBB from last year and is not meeting Sgarbossa criteria, thus no urgent Cath needed at this time. Cardiology consult for CP.     Of note, Echo from 3/3/23 shows EF 15-20%, LVIDd 6.2, mod-sev MR, mild-mod AR, mod dilated LA, severe global LVSD, LVH, mod TR, severe pulm HTN.     Allergies    No Known Allergies    Intolerances    	    MEDICATIONS  ASA 81mg   Atorvastatin 20mg  metoprolol 50mg daily  farxiga 10mg daily  entresto 24/26mg BID            PAST MEDICAL & SURGICAL HISTORY:  Hypertension      Acute CHF      CAD (coronary artery disease)      No significant past surgical history          FAMILY HISTORY:      SOCIAL HISTORY    Marital Status:   Occupation:   Lives with: daughter, grand daughter    SUBSTANCE USE  Tobacco Usage:  ( ) None ( X) never smoked   ( ) former smoker  ( ) current smoker; Packs per day:   Alcohol Usage: (x) none  ( ) occasional ( ) 2-3 times a week ( ) daily; Last drink:   Recreational drugs (X ) None    CONSTITUTIONAL: No fevers, No chills, No fatigue, No weight gain  EYES: No vision changes   ENT: No congestion, No ear pain, No sore throat.  NECK: No pain, No stiffness  RESPIRATORY: + shortness of breath, No cough, No wheezing, No hemoptysis  CARDIOVASCULAR: +chest pain. No palpitations, + VILLELA, No orthopnea, No paroxysmal nocturnal dyspnea, No pleuritic pain  GASTROINTESTINAL: No abdominal pain, No nausea, No vomiting, No hematemesis, No diarrhea No constipation. No melena  GENITOURINARY: No dysuria, No frequency, No incontinence, No hematuria  NEUROLOGICAL: No dizziness, No lightheadedness, No syncope, No LOC, No headache, No numbness or weakness  MUSCULOSKELETAL: No Edema, No joint pain, No joint swelling.  PSYCHIATRIC: No anxiety, No depression  DERMATOLOGY: No diaphoresis. No itching, No rashes, No pressure ulcers  HEME/LYMPH: No easy bruising, or bleeding gums    All other review of systems is negative unless indicated above.    VITAL SIGNS  T(C): --  HR: 85 (04-01-23 @ 21:25) (83 - 85)  BP: 145/97 (04-01-23 @ 21:25) (145/97 - 149/93)  RR: 20 (04-01-23 @ 21:25) (20 - 20)  SpO2: 100% (04-01-23 @ 21:25) (100% - 100%)  Wt(kg): --    Appearance: NAD, no distress  HEENT: Moist Mucous Membranes,   Cardiovascular: Regular rate and rhythm, Normal S1 S2, +JVD, No murmurs  Respiratory: RLL with rales on auscultation. No rhonchi, No wheezing. No tenderness to palpation  Gastrointestinal:  Soft, Non-tender, + BS  Neurologic: Non-focal, A&Ox3  Skin: Warm and dry, No rashes, No ecchymosis, No cyanosis  Musculoskeletal: No clubbing, No cyanosis, No edema  Vascular: Peripheral pulses palpable 2+ bilaterally  Psychiatry: Mood & affect appropriate      	    		        I&O's Summary      LABORATORY VALUES	 	                          11.9   3.90  )-----------( 222      ( 01 Apr 2023 21:04 )             39.7       04-01    143  |  108<H>  |  19  ----------------------------<  129<H>  4.5   |  21<L>  |  1.54<H>    Ca    9.4      01 Apr 2023 21:04    TPro  7.0  /  Alb  4.3  /  TBili  0.5  /  DBili  x   /  AST  67<H>  /  ALT  92<H>  /  AlkPhos  105  04-01    LIVER FUNCTIONS - ( 01 Apr 2023 21:04 )  Alb: 4.3 g/dL / Pro: 7.0 g/dL / ALK PHOS: 105 U/L / ALT: 92 U/L / AST: 67 U/L / GGT: x           Prothrombin Time, Plasma: 12.2 sec (04-01 @ 21:04)      CARDIAC MARKERS:            Blood Gas Venous - Lactate: 2.2 mmol/L (04-01 @ 21:04)              CAPILLARY BLOOD GLUCOSE          	    ECG:  NSR, VR 90 with LBBB and LVH	  RADIOLOGY:  < from: Xray Chest 1 View- PORTABLE-Urgent (Xray Chest 1 View- PORTABLE-Urgent .) (04.01.23 @ 21:06) >    FINDINGS:  The heart is poorly assessed on this projection..  Small right pleural effusion with associated atelectasis.  There is no pneumothorax or pleural effusion.    IMPRESSION:  Small right pleural effusion with associated atelectasis.        ******PRELIMINARY REPORT******      ******PRELIMINARY REPORT******       GABRIELA LI MD; Resident Radiologist  This document is a PRELIMINARY interpretation and is pending final   attending approval. Apr 1 2023  9:07PM    < end of copied text >  	    PREVIOUS DIAGNOSTIC TESTING:    [ ] Echocardiogram: on allscript  3/3/23 shows EF 15-20%, LVIDd 6.2, mod-sev MR, mild-mod AR, mod dilated LA, severe global LVSD, LVH, mod TR, severe pulm HTN.              CC:  Patient is a 87y old  Female who presents with a chief complaint of CP  HPI:  87F with pmhx of HFrEF (Ef 15-20% from echo on 3/3/23), CAD with 1 stent (2006), LBBB, HTN, HLD, mild dementia (a&o X2), and ataxia present with c/o CP. Patient is forgetful and is poor historian, so collateral information gathered from the daughter, Sarah. As per the daughter, patient was c/o cp and crying from severe chest tightness around 4pm today, so called EMS and brought her to ED. As per the daughter, since Dec 2022, patient has been c/o intermittent CP/tightness, and dyspnea. Pt's cardiologist is Dr. Anand López. The daughter took the pt to Card f/u on 3/24/23, (saw Dr. Egan) was started on Entresto 24/26, Farxiga 10mg, and inc metoprolol to 50mg from 25mg, also had a discussion of possible BIV ppm in the future. Pt denies palpitations, lightheadedness, n/v/d, syncope, fever or chills.    In Ed, /93, HR 83, RR20, o2sat 100% on 2 liters n/c, EKG shows NSR VR 90, LBBB, LVH (seen with previous EKG), CXR with small right pleural effusion associated with atelectasis, proBNP 95345, troponin 27, Scr 1.54, VBG lactate 2.2. ED provider contacted Dr. Angulo, cardiology interventionalist with a concern for possible cath. However, pt has known LBBB from last year and is not meeting Sgarbossa criteria, thus no urgent Cath needed at this time. Cardiology consult for CP.     Of note, Echo from 3/3/23 shows EF 15-20%, LVIDd 6.2, mod-sev MR, mild-mod AR, mod dilated LA, severe global LVSD, LVH, mod TR, severe pulm HTN.     Allergies    No Known Allergies    Intolerances    	    MEDICATIONS  ASA 81mg   Atorvastatin 20mg  metoprolol 50mg daily  farxiga 10mg daily  entresto 24/26mg BID            PAST MEDICAL & SURGICAL HISTORY:  Hypertension      Acute CHF      CAD (coronary artery disease)      No significant past surgical history          FAMILY HISTORY:      SOCIAL HISTORY    Marital Status:   Occupation:   Lives with: daughter, grand daughter    SUBSTANCE USE  Tobacco Usage:  ( ) None ( X) never smoked   ( ) former smoker  ( ) current smoker; Packs per day:   Alcohol Usage: (x) none  ( ) occasional ( ) 2-3 times a week ( ) daily; Last drink:   Recreational drugs (X ) None    CONSTITUTIONAL: No fevers, No chills, No fatigue, No weight gain  EYES: No vision changes   ENT: No congestion, No ear pain, No sore throat.  NECK: No pain, No stiffness  RESPIRATORY: + shortness of breath, No cough, No wheezing, No hemoptysis  CARDIOVASCULAR: +chest pain. No palpitations, + VILLELA, No orthopnea, No paroxysmal nocturnal dyspnea, No pleuritic pain  GASTROINTESTINAL: No abdominal pain, No nausea, No vomiting, No hematemesis, No diarrhea No constipation. No melena  GENITOURINARY: No dysuria, No frequency, No incontinence, No hematuria  NEUROLOGICAL: No dizziness, No lightheadedness, No syncope, No LOC, No headache, No numbness or weakness  MUSCULOSKELETAL: No Edema, No joint pain, No joint swelling.  PSYCHIATRIC: No anxiety, No depression  DERMATOLOGY: No diaphoresis. No itching, No rashes, No pressure ulcers  HEME/LYMPH: No easy bruising, or bleeding gums    All other review of systems is negative unless indicated above.    VITAL SIGNS  T(C): --  HR: 85 (04-01-23 @ 21:25) (83 - 85)  BP: 145/97 (04-01-23 @ 21:25) (145/97 - 149/93)  RR: 20 (04-01-23 @ 21:25) (20 - 20)  SpO2: 100% (04-01-23 @ 21:25) (100% - 100%)  Wt(kg): --    Appearance: NAD, no distress  HEENT: Moist Mucous Membranes,   Cardiovascular: Regular rate and rhythm, Normal S1 S2, +JVD, No murmurs  Respiratory: RLL with rales on auscultation. No rhonchi, No wheezing. No tenderness to palpation  Gastrointestinal:  Soft, Non-tender, + BS  Neurologic: Non-focal, A&Ox3  Skin: Warm and dry, No rashes, No ecchymosis, No cyanosis  Musculoskeletal: No clubbing, No cyanosis, No edema  Vascular: Peripheral pulses palpable 2+ bilaterally  Psychiatry: Mood & affect appropriate      	    		        I&O's Summary      LABORATORY VALUES	 	                          11.9   3.90  )-----------( 222      ( 01 Apr 2023 21:04 )             39.7       04-01    143  |  108<H>  |  19  ----------------------------<  129<H>  4.5   |  21<L>  |  1.54<H>    Ca    9.4      01 Apr 2023 21:04    TPro  7.0  /  Alb  4.3  /  TBili  0.5  /  DBili  x   /  AST  67<H>  /  ALT  92<H>  /  AlkPhos  105  04-01    LIVER FUNCTIONS - ( 01 Apr 2023 21:04 )  Alb: 4.3 g/dL / Pro: 7.0 g/dL / ALK PHOS: 105 U/L / ALT: 92 U/L / AST: 67 U/L / GGT: x           Prothrombin Time, Plasma: 12.2 sec (04-01 @ 21:04)      CARDIAC MARKERS:  troponin 27      Blood Gas Venous - Lactate: 2.2 mmol/L (04-01 @ 21:04)      	    ECG:  NSR, VR 90 with LBBB and LVH	  RADIOLOGY:  < from: Xray Chest 1 View- PORTABLE-Urgent (Xray Chest 1 View- PORTABLE-Urgent .) (04.01.23 @ 21:06) >    FINDINGS:  The heart is poorly assessed on this projection..  Small right pleural effusion with associated atelectasis.  There is no pneumothorax or pleural effusion.    IMPRESSION:  Small right pleural effusion with associated atelectasis.        ******PRELIMINARY REPORT******      ******PRELIMINARY REPORT******       GABRIELA LI MD; Resident Radiologist  This document is a PRELIMINARY interpretation and is pending final   attending approval. Apr 1 2023  9:07PM    < end of copied text >  	    PREVIOUS DIAGNOSTIC TESTING:    [ ] Echocardiogram: on allscript  3/3/23 shows EF 15-20%, LVIDd 6.2, mod-sev MR, mild-mod AR, mod dilated LA, severe global LVSD, LVH, mod TR, severe pulm HTN.              CC:  Patient is a 87y old  Female who presents with a chief complaint of CP  HPI:  87F with pmhx of HFrEF (Ef 15-20% from echo on 3/3/23), CAD with 1 stent (2006), LBBB, HTN, HLD, mild dementia (a&o X2), and ataxia present with c/o CP. Patient is forgetful and is poor historian, so collateral information gathered from the daughter, Sarah. As per the daughter, patient was c/o cp and crying from severe chest tightness around 4pm today, so called EMS and brought her to ED. As per the daughter, since Dec 2022, patient has been c/o intermittent CP/tightness, and dyspnea. Pt's cardiologist is Dr. Anand López. The daughter took the pt to Card f/u on 3/24/23, (saw Dr. Egan) was started on Entresto 24/26, Farxiga 10mg, and inc metoprolol to 50mg from 25mg, also had a discussion of possible BIV ppm in the future. Pt received nitroglycerin X1, and ASA 324mg from EMS. Pt denies palpitations, lightheadedness, n/v/d, syncope, fever or chills.    In Ed, /93, HR 83, RR20, o2sat 100% on 2 liters n/c, received Brilinta 180mg X1, EKG shows NSR VR 90, LBBB, LVH (seen with previous EKG), CXR with small right pleural effusion associated with atelectasis, proBNP 97273, troponin 27, CK/CKMB 189/2.7, Scr 1.54, VBG lactate 2.2. ED provider contacted Dr. Angulo, cardiology interventionalist with a concern for possible cath. However, pt has known LBBB from last year and is not meeting Sgarbossa criteria, thus no urgent Cath needed at this time. Cardiology consult for CP.     Of note, Echo from 3/3/23 shows EF 15-20%, LVIDd 6.2, mod-sev MR, mild-mod AR, mod dilated LA, severe global LVSD, LVH, mod TR, severe pulm HTN.     Allergies    No Known Allergies    Intolerances    	    MEDICATIONS  ASA 81mg   Atorvastatin 20mg  metoprolol 50mg daily  farxiga 10mg daily  entresto 24/26mg BID            PAST MEDICAL & SURGICAL HISTORY:  Hypertension      Acute CHF      CAD (coronary artery disease)      No significant past surgical history          FAMILY HISTORY:      SOCIAL HISTORY    Marital Status:   Occupation:   Lives with: daughter, grand daughter    SUBSTANCE USE  Tobacco Usage:  ( ) None ( X) never smoked   ( ) former smoker  ( ) current smoker; Packs per day:   Alcohol Usage: (x) none  ( ) occasional ( ) 2-3 times a week ( ) daily; Last drink:   Recreational drugs (X ) None    CONSTITUTIONAL: No fevers, No chills, No fatigue, No weight gain  EYES: No vision changes   ENT: No congestion, No ear pain, No sore throat.  NECK: No pain, No stiffness  RESPIRATORY: + shortness of breath, No cough, No wheezing, No hemoptysis  CARDIOVASCULAR: +chest pain. No palpitations, + VILLELA, No orthopnea, No paroxysmal nocturnal dyspnea, No pleuritic pain  GASTROINTESTINAL: No abdominal pain, No nausea, No vomiting, No hematemesis, No diarrhea No constipation. No melena  GENITOURINARY: No dysuria, No frequency, No incontinence, No hematuria  NEUROLOGICAL: No dizziness, No lightheadedness, No syncope, No LOC, No headache, No numbness or weakness  MUSCULOSKELETAL: No Edema, No joint pain, No joint swelling.  PSYCHIATRIC: No anxiety, No depression  DERMATOLOGY: No diaphoresis. No itching, No rashes, No pressure ulcers  HEME/LYMPH: No easy bruising, or bleeding gums    All other review of systems is negative unless indicated above.    VITAL SIGNS  T(C): --  HR: 85 (04-01-23 @ 21:25) (83 - 85)  BP: 145/97 (04-01-23 @ 21:25) (145/97 - 149/93)  RR: 20 (04-01-23 @ 21:25) (20 - 20)  SpO2: 100% (04-01-23 @ 21:25) (100% - 100%)  Wt(kg): --    Appearance: NAD, no distress  HEENT: Moist Mucous Membranes,   Cardiovascular: Regular rate and rhythm, Normal S1 S2, +JVD, No murmurs  Respiratory: RLL with rales on auscultation. No rhonchi, No wheezing. No tenderness to palpation  Gastrointestinal:  Soft, Non-tender, + BS  Neurologic: Non-focal, A&Ox3  Skin: Warm and dry, No rashes, No ecchymosis, No cyanosis  Musculoskeletal: No clubbing, No cyanosis, No edema  Vascular: Peripheral pulses palpable 2+ bilaterally  Psychiatry: Mood & affect appropriate      	    		        I&O's Summary      LABORATORY VALUES	 	                          11.9   3.90  )-----------( 222      ( 01 Apr 2023 21:04 )             39.7       04-01    143  |  108<H>  |  19  ----------------------------<  129<H>  4.5   |  21<L>  |  1.54<H>    Ca    9.4      01 Apr 2023 21:04    TPro  7.0  /  Alb  4.3  /  TBili  0.5  /  DBili  x   /  AST  67<H>  /  ALT  92<H>  /  AlkPhos  105  04-01    LIVER FUNCTIONS - ( 01 Apr 2023 21:04 )  Alb: 4.3 g/dL / Pro: 7.0 g/dL / ALK PHOS: 105 U/L / ALT: 92 U/L / AST: 67 U/L / GGT: x           Prothrombin Time, Plasma: 12.2 sec (04-01 @ 21:04)      CARDIAC MARKERS:  troponin 27      Blood Gas Venous - Lactate: 2.2 mmol/L (04-01 @ 21:04)      	    ECG:  NSR, VR 90 with LBBB and LVH	  RADIOLOGY:  < from: Xray Chest 1 View- PORTABLE-Urgent (Xray Chest 1 View- PORTABLE-Urgent .) (04.01.23 @ 21:06) >    FINDINGS:  The heart is poorly assessed on this projection..  Small right pleural effusion with associated atelectasis.  There is no pneumothorax or pleural effusion.    IMPRESSION:  Small right pleural effusion with associated atelectasis.        ******PRELIMINARY REPORT******      ******PRELIMINARY REPORT******       GABRIELA LI MD; Resident Radiologist  This document is a PRELIMINARY interpretation and is pending final   attending approval. Apr 1 2023  9:07PM    < end of copied text >  	    PREVIOUS DIAGNOSTIC TESTING:    [ ] Echocardiogram: on allscript  3/3/23 shows EF 15-20%, LVIDd 6.2, mod-sev MR, mild-mod AR, mod dilated LA, severe global LVSD, LVH, mod TR, severe pulm HTN.

## 2023-04-02 DIAGNOSIS — N17.9 ACUTE KIDNEY FAILURE, UNSPECIFIED: ICD-10-CM

## 2023-04-02 DIAGNOSIS — I10 ESSENTIAL (PRIMARY) HYPERTENSION: ICD-10-CM

## 2023-04-02 DIAGNOSIS — I50.21 ACUTE SYSTOLIC (CONGESTIVE) HEART FAILURE: ICD-10-CM

## 2023-04-02 DIAGNOSIS — E78.5 HYPERLIPIDEMIA, UNSPECIFIED: ICD-10-CM

## 2023-04-02 DIAGNOSIS — Z29.9 ENCOUNTER FOR PROPHYLACTIC MEASURES, UNSPECIFIED: ICD-10-CM

## 2023-04-02 DIAGNOSIS — J96.01 ACUTE RESPIRATORY FAILURE WITH HYPOXIA: ICD-10-CM

## 2023-04-02 DIAGNOSIS — I20.0 UNSTABLE ANGINA: ICD-10-CM

## 2023-04-02 LAB
A1C WITH ESTIMATED AVERAGE GLUCOSE RESULT: 5.5 % — SIGNIFICANT CHANGE UP (ref 4–5.6)
ALBUMIN SERPL ELPH-MCNC: 4.5 G/DL — SIGNIFICANT CHANGE UP (ref 3.3–5)
ALP SERPL-CCNC: 108 U/L — SIGNIFICANT CHANGE UP (ref 40–120)
ALT FLD-CCNC: 100 U/L — HIGH (ref 4–33)
ANION GAP SERPL CALC-SCNC: 17 MMOL/L — HIGH (ref 7–14)
ANION GAP SERPL CALC-SCNC: 19 MMOL/L — HIGH (ref 7–14)
AST SERPL-CCNC: 73 U/L — HIGH (ref 4–32)
BASOPHILS # BLD AUTO: 0.02 K/UL — SIGNIFICANT CHANGE UP (ref 0–0.2)
BASOPHILS NFR BLD AUTO: 0.4 % — SIGNIFICANT CHANGE UP (ref 0–2)
BILIRUB SERPL-MCNC: 0.9 MG/DL — SIGNIFICANT CHANGE UP (ref 0.2–1.2)
BUN SERPL-MCNC: 20 MG/DL — SIGNIFICANT CHANGE UP (ref 7–23)
BUN SERPL-MCNC: 20 MG/DL — SIGNIFICANT CHANGE UP (ref 7–23)
CALCIUM SERPL-MCNC: 10 MG/DL — SIGNIFICANT CHANGE UP (ref 8.4–10.5)
CALCIUM SERPL-MCNC: 10.1 MG/DL — SIGNIFICANT CHANGE UP (ref 8.4–10.5)
CHLORIDE SERPL-SCNC: 103 MMOL/L — SIGNIFICANT CHANGE UP (ref 98–107)
CHLORIDE SERPL-SCNC: 103 MMOL/L — SIGNIFICANT CHANGE UP (ref 98–107)
CHOLEST SERPL-MCNC: 141 MG/DL — SIGNIFICANT CHANGE UP
CK MB BLD-MCNC: 2.4 % — SIGNIFICANT CHANGE UP (ref 0–2.5)
CK MB CFR SERPL CALC: 5.3 NG/ML — HIGH
CK SERPL-CCNC: 217 U/L — HIGH (ref 25–170)
CO2 SERPL-SCNC: 21 MMOL/L — LOW (ref 22–31)
CO2 SERPL-SCNC: 22 MMOL/L — SIGNIFICANT CHANGE UP (ref 22–31)
CREAT ?TM UR-MCNC: 17 MG/DL — SIGNIFICANT CHANGE UP
CREAT SERPL-MCNC: 1.54 MG/DL — HIGH (ref 0.5–1.3)
CREAT SERPL-MCNC: 1.55 MG/DL — HIGH (ref 0.5–1.3)
EGFR: 32 ML/MIN/1.73M2 — LOW
EGFR: 32 ML/MIN/1.73M2 — LOW
EOSINOPHIL # BLD AUTO: 0 K/UL — SIGNIFICANT CHANGE UP (ref 0–0.5)
EOSINOPHIL NFR BLD AUTO: 0 % — SIGNIFICANT CHANGE UP (ref 0–6)
ESTIMATED AVERAGE GLUCOSE: 111 — SIGNIFICANT CHANGE UP
GLUCOSE SERPL-MCNC: 123 MG/DL — HIGH (ref 70–99)
GLUCOSE SERPL-MCNC: 123 MG/DL — HIGH (ref 70–99)
HCT VFR BLD CALC: 41.5 % — SIGNIFICANT CHANGE UP (ref 34.5–45)
HDLC SERPL-MCNC: 63 MG/DL — SIGNIFICANT CHANGE UP
HGB BLD-MCNC: 12.6 G/DL — SIGNIFICANT CHANGE UP (ref 11.5–15.5)
IANC: 4.57 K/UL — SIGNIFICANT CHANGE UP (ref 1.8–7.4)
IMM GRANULOCYTES NFR BLD AUTO: 0.4 % — SIGNIFICANT CHANGE UP (ref 0–0.9)
LACTATE SERPL-SCNC: 2.4 MMOL/L — HIGH (ref 0.5–2)
LIPID PNL WITH DIRECT LDL SERPL: 61 MG/DL — SIGNIFICANT CHANGE UP
LYMPHOCYTES # BLD AUTO: 0.45 K/UL — LOW (ref 1–3.3)
LYMPHOCYTES # BLD AUTO: 8.7 % — LOW (ref 13–44)
MAGNESIUM SERPL-MCNC: 2.1 MG/DL — SIGNIFICANT CHANGE UP (ref 1.6–2.6)
MAGNESIUM SERPL-MCNC: 2.1 MG/DL — SIGNIFICANT CHANGE UP (ref 1.6–2.6)
MCHC RBC-ENTMCNC: 25.7 PG — LOW (ref 27–34)
MCHC RBC-ENTMCNC: 30.4 GM/DL — LOW (ref 32–36)
MCV RBC AUTO: 84.7 FL — SIGNIFICANT CHANGE UP (ref 80–100)
MONOCYTES # BLD AUTO: 0.11 K/UL — SIGNIFICANT CHANGE UP (ref 0–0.9)
MONOCYTES NFR BLD AUTO: 2.1 % — SIGNIFICANT CHANGE UP (ref 2–14)
NEUTROPHILS # BLD AUTO: 4.57 K/UL — SIGNIFICANT CHANGE UP (ref 1.8–7.4)
NEUTROPHILS NFR BLD AUTO: 88.4 % — HIGH (ref 43–77)
NON HDL CHOLESTEROL: 78 MG/DL — SIGNIFICANT CHANGE UP
NRBC # BLD: 0 /100 WBCS — SIGNIFICANT CHANGE UP (ref 0–0)
NRBC # FLD: 0 K/UL — SIGNIFICANT CHANGE UP (ref 0–0)
PHOSPHATE SERPL-MCNC: 4.5 MG/DL — SIGNIFICANT CHANGE UP (ref 2.5–4.5)
PLATELET # BLD AUTO: 272 K/UL — SIGNIFICANT CHANGE UP (ref 150–400)
POTASSIUM SERPL-MCNC: 4.1 MMOL/L — SIGNIFICANT CHANGE UP (ref 3.5–5.3)
POTASSIUM SERPL-MCNC: 4.2 MMOL/L — SIGNIFICANT CHANGE UP (ref 3.5–5.3)
POTASSIUM SERPL-SCNC: 4.1 MMOL/L — SIGNIFICANT CHANGE UP (ref 3.5–5.3)
POTASSIUM SERPL-SCNC: 4.2 MMOL/L — SIGNIFICANT CHANGE UP (ref 3.5–5.3)
PROT SERPL-MCNC: 7.6 G/DL — SIGNIFICANT CHANGE UP (ref 6–8.3)
RBC # BLD: 4.9 M/UL — SIGNIFICANT CHANGE UP (ref 3.8–5.2)
RBC # FLD: 14.7 % — HIGH (ref 10.3–14.5)
SODIUM SERPL-SCNC: 142 MMOL/L — SIGNIFICANT CHANGE UP (ref 135–145)
SODIUM SERPL-SCNC: 143 MMOL/L — SIGNIFICANT CHANGE UP (ref 135–145)
SODIUM UR-SCNC: 124 MMOL/L — SIGNIFICANT CHANGE UP
TRIGL SERPL-MCNC: 86 MG/DL — SIGNIFICANT CHANGE UP
TROPONIN T, HIGH SENSITIVITY RESULT: 43 NG/L — SIGNIFICANT CHANGE UP
TROPONIN T, HIGH SENSITIVITY RESULT: 49 NG/L — SIGNIFICANT CHANGE UP
TSH SERPL-MCNC: 0.97 UIU/ML — SIGNIFICANT CHANGE UP (ref 0.27–4.2)
UUN UR-MCNC: 91.8 MG/DL — SIGNIFICANT CHANGE UP
WBC # BLD: 5.17 K/UL — SIGNIFICANT CHANGE UP (ref 3.8–10.5)
WBC # FLD AUTO: 5.17 K/UL — SIGNIFICANT CHANGE UP (ref 3.8–10.5)

## 2023-04-02 PROCEDURE — 76770 US EXAM ABDO BACK WALL COMP: CPT | Mod: 26

## 2023-04-02 PROCEDURE — 99223 1ST HOSP IP/OBS HIGH 75: CPT

## 2023-04-02 PROCEDURE — 99232 SBSQ HOSP IP/OBS MODERATE 35: CPT

## 2023-04-02 RX ORDER — SERTRALINE 25 MG/1
25 TABLET, FILM COATED ORAL DAILY
Refills: 0 | Status: DISCONTINUED | OUTPATIENT
Start: 2023-04-02 | End: 2023-04-05

## 2023-04-02 RX ORDER — ASPIRIN/CALCIUM CARB/MAGNESIUM 324 MG
81 TABLET ORAL DAILY
Refills: 0 | Status: DISCONTINUED | OUTPATIENT
Start: 2023-04-02 | End: 2023-04-05

## 2023-04-02 RX ORDER — CYCLOBENZAPRINE HYDROCHLORIDE 10 MG/1
5 TABLET, FILM COATED ORAL THREE TIMES A DAY
Refills: 0 | Status: DISCONTINUED | OUTPATIENT
Start: 2023-04-02 | End: 2023-04-05

## 2023-04-02 RX ORDER — FUROSEMIDE 40 MG
40 TABLET ORAL
Refills: 0 | Status: DISCONTINUED | OUTPATIENT
Start: 2023-04-02 | End: 2023-04-05

## 2023-04-02 RX ORDER — QUETIAPINE FUMARATE 200 MG/1
12.5 TABLET, FILM COATED ORAL AT BEDTIME
Refills: 0 | Status: DISCONTINUED | OUTPATIENT
Start: 2023-04-02 | End: 2023-04-05

## 2023-04-02 RX ORDER — OLANZAPINE 15 MG/1
1.25 TABLET, FILM COATED ORAL EVERY 8 HOURS
Refills: 0 | Status: DISCONTINUED | OUTPATIENT
Start: 2023-04-02 | End: 2023-04-05

## 2023-04-02 RX ORDER — ATORVASTATIN CALCIUM 80 MG/1
20 TABLET, FILM COATED ORAL AT BEDTIME
Refills: 0 | Status: DISCONTINUED | OUTPATIENT
Start: 2023-04-02 | End: 2023-04-05

## 2023-04-02 RX ORDER — METOPROLOL TARTRATE 50 MG
50 TABLET ORAL DAILY
Refills: 0 | Status: DISCONTINUED | OUTPATIENT
Start: 2023-04-02 | End: 2023-04-02

## 2023-04-02 RX ORDER — LANOLIN ALCOHOL/MO/W.PET/CERES
3 CREAM (GRAM) TOPICAL AT BEDTIME
Refills: 0 | Status: DISCONTINUED | OUTPATIENT
Start: 2023-04-02 | End: 2023-04-05

## 2023-04-02 RX ORDER — METOPROLOL TARTRATE 50 MG
50 TABLET ORAL DAILY
Refills: 0 | Status: DISCONTINUED | OUTPATIENT
Start: 2023-04-02 | End: 2023-04-05

## 2023-04-02 RX ORDER — LANOLIN ALCOHOL/MO/W.PET/CERES
3 CREAM (GRAM) TOPICAL ONCE
Refills: 0 | Status: COMPLETED | OUTPATIENT
Start: 2023-04-02 | End: 2023-04-02

## 2023-04-02 RX ORDER — ACETAMINOPHEN 500 MG
975 TABLET ORAL EVERY 6 HOURS
Refills: 0 | Status: COMPLETED | OUTPATIENT
Start: 2023-04-02 | End: 2023-04-05

## 2023-04-02 RX ORDER — HEPARIN SODIUM 5000 [USP'U]/ML
5000 INJECTION INTRAVENOUS; SUBCUTANEOUS EVERY 12 HOURS
Refills: 0 | Status: DISCONTINUED | OUTPATIENT
Start: 2023-04-02 | End: 2023-04-05

## 2023-04-02 RX ORDER — ACETAMINOPHEN 500 MG
650 TABLET ORAL EVERY 6 HOURS
Refills: 0 | Status: DISCONTINUED | OUTPATIENT
Start: 2023-04-02 | End: 2023-04-02

## 2023-04-02 RX ADMIN — Medication 3 MILLIGRAM(S): at 03:39

## 2023-04-02 RX ADMIN — ATORVASTATIN CALCIUM 20 MILLIGRAM(S): 80 TABLET, FILM COATED ORAL at 21:22

## 2023-04-02 RX ADMIN — LIDOCAINE 1 PATCH: 4 CREAM TOPICAL at 12:19

## 2023-04-02 RX ADMIN — Medication 975 MILLIGRAM(S): at 18:44

## 2023-04-02 RX ADMIN — CYCLOBENZAPRINE HYDROCHLORIDE 5 MILLIGRAM(S): 10 TABLET, FILM COATED ORAL at 21:23

## 2023-04-02 RX ADMIN — Medication 81 MILLIGRAM(S): at 11:13

## 2023-04-02 RX ADMIN — Medication 40 MILLIGRAM(S): at 08:39

## 2023-04-02 RX ADMIN — HEPARIN SODIUM 5000 UNIT(S): 5000 INJECTION INTRAVENOUS; SUBCUTANEOUS at 08:39

## 2023-04-02 RX ADMIN — Medication 40 MILLIGRAM(S): at 00:38

## 2023-04-02 RX ADMIN — Medication 650 MILLIGRAM(S): at 12:19

## 2023-04-02 RX ADMIN — Medication 50 MILLIGRAM(S): at 08:39

## 2023-04-02 RX ADMIN — Medication 975 MILLIGRAM(S): at 17:07

## 2023-04-02 RX ADMIN — QUETIAPINE FUMARATE 12.5 MILLIGRAM(S): 200 TABLET, FILM COATED ORAL at 21:22

## 2023-04-02 RX ADMIN — Medication 650 MILLIGRAM(S): at 11:14

## 2023-04-02 RX ADMIN — SERTRALINE 25 MILLIGRAM(S): 25 TABLET, FILM COATED ORAL at 11:13

## 2023-04-02 RX ADMIN — Medication 40 MILLIGRAM(S): at 17:07

## 2023-04-02 RX ADMIN — LIDOCAINE 1 PATCH: 4 CREAM TOPICAL at 07:09

## 2023-04-02 NOTE — H&P ADULT - PROBLEM SELECTOR PLAN 4
Assessment:  - patient likely has RADHA secondary to cardiorenal syndrome    Plan:  - nephrology consult  - will c/w diuresis as above  - U/S kidneys pending  - trend Cr

## 2023-04-02 NOTE — H&P ADULT - NSHPLABSRESULTS_GEN_ALL_CORE
11.9   3.90  )-----------( 222      ( 01 Apr 2023 21:04 )             39.7       04-01    143  |  108<H>  |  19  ----------------------------<  129<H>  4.5   |  21<L>  |  1.54<H>    Ca    9.4      01 Apr 2023 21:04    TPro  7.0  /  Alb  4.3  /  TBili  0.5  /  DBili  x   /  AST  67<H>  /  ALT  92<H>  /  AlkPhos  105  04-01      CARDIAC MARKERS ( 01 Apr 2023 21:04 )  x     / x     / 189 U/L / x     / 2.7 ng/mL        PT/INR - ( 01 Apr 2023 21:04 )   PT: 12.2 sec;   INR: 1.05 ratio         PTT - ( 01 Apr 2023 21:04 )  PTT:30.8 sec    21:04 - VBG - pH: 7.31  | pCO2: 50    | pO2: 28    | Lactate: 2.2                CXR: As per my read - R pleural effusion, Will wait for prelim/official read    EKG: As per my read - LBBB, 3mm MABEL from V1-V4

## 2023-04-02 NOTE — H&P ADULT - PROBLEM SELECTOR PLAN 2
Assessment:  - patient found to have + JVD, rales on lung exam, and elevated BNP  - BNP 43194f  - CXR shows pleural effusion on the R  - EF 15% from outside echo as per cards note  - patient on 5L NC    Plan:  - will start 40mg lasix IV push BID  - monitor I/O closely  - replete electrolytes as needed  - cardiology consult  - echo pending  - hold entresto and farxiga during diuresis

## 2023-04-02 NOTE — H&P ADULT - HISTORY OF PRESENT ILLNESS
This is a 88 y/o F with pmhx of CHF, CAD, HTN presented to the ED for chest pain. She was treated as a stemi code because they saw MABEL on V1-V4 with LBBB. The patient is a poor historian, however can answer simple questions. As per daughter at bedside, she reported that the patient was feeling pressure like chest pain around 4pm today with SOB. She would often get on and off chest pain and SOB in the past. At bedside, the patient denies SOB and chest pain at bedside. The patient follows with [facility] cardiology for her cardiac care, Dr. Claudio Egan. As per his notes, the patient has nonischemic cardiomyopathy diagnosed in december. 2 weeks ago, he started the patient on Entresto and farxiga, and doubled her metoprolol dose by telling her to take 2 pills of 25mg instead of 1. ROS otherwise negative. Denies LE edema. At this time, the patient barely walks, would get easily SOB. Before in the past she is able to walk for a block.

## 2023-04-02 NOTE — H&P ADULT - NSHPREVIEWOFSYSTEMS_GEN_ALL_CORE
REVIEW OF SYSTEMS:    CONSTITUTIONAL: No weakness, fevers or chills  EYES/ENT: No visual changes;  No dysphagia; No sore throat; No rhinorrhea; No sinus pain/pressure  NECK: No pain or stiffness  RESPIRATORY: No cough, wheezing, hemoptysis; + shortness of breath  CARDIOVASCULAR: + chest pain, no palpitations; No lower extremity edema  GASTROINTESTINAL: No abdominal or epigastric pain. No nausea, vomiting, or hematemesis; No diarrhea or constipation. No melena or hematochezia.  GENITOURINARY: No dysuria, frequency or hematuria  NEUROLOGICAL: No numbness or weakness  MSK: ambulates with cane  SKIN: No itching, burning, rashes, or lesions   All other review of systems is negative unless indicated above.

## 2023-04-02 NOTE — H&P ADULT - NSHPPHYSICALEXAM_GEN_ALL_CORE
PHYSICAL EXAM:  VITALS: Vital Signs Last 24 Hrs  T(C): --  T(F): --  HR: 85 (01 Apr 2023 21:25) (83 - 85)  BP: 145/97 (01 Apr 2023 21:25) (145/97 - 149/93)  BP(mean): --  RR: 20 (01 Apr 2023 21:25) (20 - 20)  SpO2: 100% (01 Apr 2023 21:25) (100% - 100%)    Parameters below as of 01 Apr 2023 21:25  Patient On (Oxygen Delivery Method): nasal cannula  O2 Flow (L/min): 2    GENERAL: NAD, comfortable at bedside  HEAD:  Atraumatic, Normocephalic  EYES: EOMI, PERRL, conjunctiva and sclera clear  ENT: Moist Mucus Membranes present, no ulcers appreciated  NECK: Supple, + JVD  CHEST/LUNG: rales b/l on all lung fields, no accessory muscle use  HEART: Regular rate and rhythm; No murmurs, rubs, or gallops, (+)S1, S2  ABDOMEN: Soft, Nontender, Nondistended; Normal Bowel sounds   EXTREMITIES:  2+ Peripheral Pulses, No clubbing, cyanosis, or edema  PSYCH: normal mood and affect  NEUROLOGY: AAOx2, non-focal  SKIN: No rashes or lesions

## 2023-04-02 NOTE — H&P ADULT - PROBLEM SELECTOR PLAN 1
Assessment:  - patient presented for new onset chest pain  - troponins 27, CKMB 2.7 wnl  - EKG shows LBB with 3mm MABEL V1-V4, however does not meet Sgarbossa Criteria  - no longer has chest pain at bedside    Plan:  - tele monitoring  - low suspicion for ACS at this time as per cards, no emergent cath to be done at this time  - echo pending  - cardiology consult  - patient instructed to notify RN and primary team if there are new onset chest pain  - c/w home meds for now, hold entresto and Farxiga for now due to CHF

## 2023-04-02 NOTE — H&P ADULT - ASSESSMENT
86 y/o F with pmhx of CHF, CAD, HTN presented to the ED for chest pain. Will need ACS work up. Also found to have CHF exacerbation. Admit for CHF exacerbation requiring diuresis.

## 2023-04-03 ENCOUNTER — APPOINTMENT (OUTPATIENT)
Dept: CARDIOLOGY | Facility: CLINIC | Age: 88
End: 2023-04-03

## 2023-04-03 ENCOUNTER — NON-APPOINTMENT (OUTPATIENT)
Age: 88
End: 2023-04-03

## 2023-04-03 DIAGNOSIS — M54.50 LOW BACK PAIN, UNSPECIFIED: ICD-10-CM

## 2023-04-03 LAB
ANION GAP SERPL CALC-SCNC: 14 MMOL/L — SIGNIFICANT CHANGE UP (ref 7–14)
ANION GAP SERPL CALC-SCNC: 14 MMOL/L — SIGNIFICANT CHANGE UP (ref 7–14)
BASOPHILS # BLD AUTO: 0.04 K/UL — SIGNIFICANT CHANGE UP (ref 0–0.2)
BASOPHILS NFR BLD AUTO: 0.8 % — SIGNIFICANT CHANGE UP (ref 0–2)
BUN SERPL-MCNC: 22 MG/DL — SIGNIFICANT CHANGE UP (ref 7–23)
BUN SERPL-MCNC: 22 MG/DL — SIGNIFICANT CHANGE UP (ref 7–23)
CALCIUM SERPL-MCNC: 9.5 MG/DL — SIGNIFICANT CHANGE UP (ref 8.4–10.5)
CALCIUM SERPL-MCNC: 9.6 MG/DL — SIGNIFICANT CHANGE UP (ref 8.4–10.5)
CHLORIDE SERPL-SCNC: 104 MMOL/L — SIGNIFICANT CHANGE UP (ref 98–107)
CHLORIDE SERPL-SCNC: 104 MMOL/L — SIGNIFICANT CHANGE UP (ref 98–107)
CO2 SERPL-SCNC: 27 MMOL/L — SIGNIFICANT CHANGE UP (ref 22–31)
CO2 SERPL-SCNC: 27 MMOL/L — SIGNIFICANT CHANGE UP (ref 22–31)
CREAT SERPL-MCNC: 1.69 MG/DL — HIGH (ref 0.5–1.3)
CREAT SERPL-MCNC: 1.74 MG/DL — HIGH (ref 0.5–1.3)
EGFR: 28 ML/MIN/1.73M2 — LOW
EGFR: 29 ML/MIN/1.73M2 — LOW
EOSINOPHIL # BLD AUTO: 0.04 K/UL — SIGNIFICANT CHANGE UP (ref 0–0.5)
EOSINOPHIL NFR BLD AUTO: 0.8 % — SIGNIFICANT CHANGE UP (ref 0–6)
GLUCOSE SERPL-MCNC: 115 MG/DL — HIGH (ref 70–99)
GLUCOSE SERPL-MCNC: 116 MG/DL — HIGH (ref 70–99)
HCT VFR BLD CALC: 39.5 % — SIGNIFICANT CHANGE UP (ref 34.5–45)
HGB BLD-MCNC: 12.1 G/DL — SIGNIFICANT CHANGE UP (ref 11.5–15.5)
IANC: 3.31 K/UL — SIGNIFICANT CHANGE UP (ref 1.8–7.4)
IMM GRANULOCYTES NFR BLD AUTO: 0.2 % — SIGNIFICANT CHANGE UP (ref 0–0.9)
LYMPHOCYTES # BLD AUTO: 1.43 K/UL — SIGNIFICANT CHANGE UP (ref 1–3.3)
LYMPHOCYTES # BLD AUTO: 27 % — SIGNIFICANT CHANGE UP (ref 13–44)
MAGNESIUM SERPL-MCNC: 1.9 MG/DL — SIGNIFICANT CHANGE UP (ref 1.6–2.6)
MAGNESIUM SERPL-MCNC: 1.9 MG/DL — SIGNIFICANT CHANGE UP (ref 1.6–2.6)
MCHC RBC-ENTMCNC: 26 PG — LOW (ref 27–34)
MCHC RBC-ENTMCNC: 30.6 GM/DL — LOW (ref 32–36)
MCV RBC AUTO: 84.8 FL — SIGNIFICANT CHANGE UP (ref 80–100)
MONOCYTES # BLD AUTO: 0.46 K/UL — SIGNIFICANT CHANGE UP (ref 0–0.9)
MONOCYTES NFR BLD AUTO: 8.7 % — SIGNIFICANT CHANGE UP (ref 2–14)
NEUTROPHILS # BLD AUTO: 3.31 K/UL — SIGNIFICANT CHANGE UP (ref 1.8–7.4)
NEUTROPHILS NFR BLD AUTO: 62.5 % — SIGNIFICANT CHANGE UP (ref 43–77)
NRBC # BLD: 0 /100 WBCS — SIGNIFICANT CHANGE UP (ref 0–0)
NRBC # FLD: 0 K/UL — SIGNIFICANT CHANGE UP (ref 0–0)
PHOSPHATE SERPL-MCNC: 4.3 MG/DL — SIGNIFICANT CHANGE UP (ref 2.5–4.5)
PLATELET # BLD AUTO: 247 K/UL — SIGNIFICANT CHANGE UP (ref 150–400)
POTASSIUM SERPL-MCNC: 3.8 MMOL/L — SIGNIFICANT CHANGE UP (ref 3.5–5.3)
POTASSIUM SERPL-MCNC: 3.8 MMOL/L — SIGNIFICANT CHANGE UP (ref 3.5–5.3)
POTASSIUM SERPL-SCNC: 3.8 MMOL/L — SIGNIFICANT CHANGE UP (ref 3.5–5.3)
POTASSIUM SERPL-SCNC: 3.8 MMOL/L — SIGNIFICANT CHANGE UP (ref 3.5–5.3)
RBC # BLD: 4.66 M/UL — SIGNIFICANT CHANGE UP (ref 3.8–5.2)
RBC # FLD: 14.7 % — HIGH (ref 10.3–14.5)
SODIUM SERPL-SCNC: 145 MMOL/L — SIGNIFICANT CHANGE UP (ref 135–145)
SODIUM SERPL-SCNC: 145 MMOL/L — SIGNIFICANT CHANGE UP (ref 135–145)
WBC # BLD: 5.29 K/UL — SIGNIFICANT CHANGE UP (ref 3.8–10.5)
WBC # FLD AUTO: 5.29 K/UL — SIGNIFICANT CHANGE UP (ref 3.8–10.5)

## 2023-04-03 PROCEDURE — 72100 X-RAY EXAM L-S SPINE 2/3 VWS: CPT | Mod: 26

## 2023-04-03 PROCEDURE — 99233 SBSQ HOSP IP/OBS HIGH 50: CPT

## 2023-04-03 PROCEDURE — 72020 X-RAY EXAM OF SPINE 1 VIEW: CPT | Mod: 26

## 2023-04-03 PROCEDURE — 99232 SBSQ HOSP IP/OBS MODERATE 35: CPT

## 2023-04-03 PROCEDURE — 93306 TTE W/DOPPLER COMPLETE: CPT | Mod: 26

## 2023-04-03 RX ADMIN — Medication 40 MILLIGRAM(S): at 13:03

## 2023-04-03 RX ADMIN — QUETIAPINE FUMARATE 12.5 MILLIGRAM(S): 200 TABLET, FILM COATED ORAL at 22:02

## 2023-04-03 RX ADMIN — Medication 975 MILLIGRAM(S): at 13:03

## 2023-04-03 RX ADMIN — HEPARIN SODIUM 5000 UNIT(S): 5000 INJECTION INTRAVENOUS; SUBCUTANEOUS at 06:18

## 2023-04-03 RX ADMIN — SERTRALINE 25 MILLIGRAM(S): 25 TABLET, FILM COATED ORAL at 13:03

## 2023-04-03 RX ADMIN — Medication 975 MILLIGRAM(S): at 17:15

## 2023-04-03 RX ADMIN — HEPARIN SODIUM 5000 UNIT(S): 5000 INJECTION INTRAVENOUS; SUBCUTANEOUS at 17:16

## 2023-04-03 RX ADMIN — ATORVASTATIN CALCIUM 20 MILLIGRAM(S): 80 TABLET, FILM COATED ORAL at 22:02

## 2023-04-03 RX ADMIN — Medication 81 MILLIGRAM(S): at 13:03

## 2023-04-03 RX ADMIN — Medication 40 MILLIGRAM(S): at 06:17

## 2023-04-03 RX ADMIN — Medication 975 MILLIGRAM(S): at 06:18

## 2023-04-03 RX ADMIN — Medication 975 MILLIGRAM(S): at 13:45

## 2023-04-03 RX ADMIN — Medication 50 MILLIGRAM(S): at 06:18

## 2023-04-04 ENCOUNTER — NON-APPOINTMENT (OUTPATIENT)
Age: 88
End: 2023-04-04

## 2023-04-04 LAB
ANION GAP SERPL CALC-SCNC: 13 MMOL/L — SIGNIFICANT CHANGE UP (ref 7–14)
BASOPHILS # BLD AUTO: 0.04 K/UL — SIGNIFICANT CHANGE UP (ref 0–0.2)
BASOPHILS NFR BLD AUTO: 1 % — SIGNIFICANT CHANGE UP (ref 0–2)
BUN SERPL-MCNC: 23 MG/DL — SIGNIFICANT CHANGE UP (ref 7–23)
CALCIUM SERPL-MCNC: 9.2 MG/DL — SIGNIFICANT CHANGE UP (ref 8.4–10.5)
CHLORIDE SERPL-SCNC: 102 MMOL/L — SIGNIFICANT CHANGE UP (ref 98–107)
CO2 SERPL-SCNC: 30 MMOL/L — SIGNIFICANT CHANGE UP (ref 22–31)
CREAT SERPL-MCNC: 1.66 MG/DL — HIGH (ref 0.5–1.3)
EGFR: 30 ML/MIN/1.73M2 — LOW
EOSINOPHIL # BLD AUTO: 0.12 K/UL — SIGNIFICANT CHANGE UP (ref 0–0.5)
EOSINOPHIL NFR BLD AUTO: 2.9 % — SIGNIFICANT CHANGE UP (ref 0–6)
GLUCOSE SERPL-MCNC: 87 MG/DL — SIGNIFICANT CHANGE UP (ref 70–99)
HCT VFR BLD CALC: 39.7 % — SIGNIFICANT CHANGE UP (ref 34.5–45)
HGB BLD-MCNC: 11.9 G/DL — SIGNIFICANT CHANGE UP (ref 11.5–15.5)
IANC: 2.07 K/UL — SIGNIFICANT CHANGE UP (ref 1.8–7.4)
IMM GRANULOCYTES NFR BLD AUTO: 0 % — SIGNIFICANT CHANGE UP (ref 0–0.9)
LYMPHOCYTES # BLD AUTO: 1.52 K/UL — SIGNIFICANT CHANGE UP (ref 1–3.3)
LYMPHOCYTES # BLD AUTO: 36.9 % — SIGNIFICANT CHANGE UP (ref 13–44)
MAGNESIUM SERPL-MCNC: 1.7 MG/DL — SIGNIFICANT CHANGE UP (ref 1.6–2.6)
MCHC RBC-ENTMCNC: 25.8 PG — LOW (ref 27–34)
MCHC RBC-ENTMCNC: 30 GM/DL — LOW (ref 32–36)
MCV RBC AUTO: 86.1 FL — SIGNIFICANT CHANGE UP (ref 80–100)
MONOCYTES # BLD AUTO: 0.37 K/UL — SIGNIFICANT CHANGE UP (ref 0–0.9)
MONOCYTES NFR BLD AUTO: 9 % — SIGNIFICANT CHANGE UP (ref 2–14)
NEUTROPHILS # BLD AUTO: 2.07 K/UL — SIGNIFICANT CHANGE UP (ref 1.8–7.4)
NEUTROPHILS NFR BLD AUTO: 50.2 % — SIGNIFICANT CHANGE UP (ref 43–77)
NRBC # BLD: 0 /100 WBCS — SIGNIFICANT CHANGE UP (ref 0–0)
NRBC # FLD: 0 K/UL — SIGNIFICANT CHANGE UP (ref 0–0)
PHOSPHATE SERPL-MCNC: 3.7 MG/DL — SIGNIFICANT CHANGE UP (ref 2.5–4.5)
PLATELET # BLD AUTO: 241 K/UL — SIGNIFICANT CHANGE UP (ref 150–400)
POTASSIUM SERPL-MCNC: 3.3 MMOL/L — LOW (ref 3.5–5.3)
POTASSIUM SERPL-SCNC: 3.3 MMOL/L — LOW (ref 3.5–5.3)
RBC # BLD: 4.61 M/UL — SIGNIFICANT CHANGE UP (ref 3.8–5.2)
RBC # FLD: 14.4 % — SIGNIFICANT CHANGE UP (ref 10.3–14.5)
SODIUM SERPL-SCNC: 145 MMOL/L — SIGNIFICANT CHANGE UP (ref 135–145)
WBC # BLD: 4.12 K/UL — SIGNIFICANT CHANGE UP (ref 3.8–10.5)
WBC # FLD AUTO: 4.12 K/UL — SIGNIFICANT CHANGE UP (ref 3.8–10.5)

## 2023-04-04 PROCEDURE — 99232 SBSQ HOSP IP/OBS MODERATE 35: CPT

## 2023-04-04 PROCEDURE — 99233 SBSQ HOSP IP/OBS HIGH 50: CPT

## 2023-04-04 RX ORDER — POTASSIUM CHLORIDE 20 MEQ
40 PACKET (EA) ORAL EVERY 4 HOURS
Refills: 0 | Status: COMPLETED | OUTPATIENT
Start: 2023-04-04 | End: 2023-04-04

## 2023-04-04 RX ORDER — MAGNESIUM SULFATE 500 MG/ML
1 VIAL (ML) INJECTION ONCE
Refills: 0 | Status: COMPLETED | OUTPATIENT
Start: 2023-04-04 | End: 2023-04-04

## 2023-04-04 RX ADMIN — Medication 975 MILLIGRAM(S): at 11:35

## 2023-04-04 RX ADMIN — Medication 975 MILLIGRAM(S): at 17:47

## 2023-04-04 RX ADMIN — QUETIAPINE FUMARATE 12.5 MILLIGRAM(S): 200 TABLET, FILM COATED ORAL at 21:20

## 2023-04-04 RX ADMIN — Medication 975 MILLIGRAM(S): at 06:25

## 2023-04-04 RX ADMIN — Medication 40 MILLIEQUIVALENT(S): at 09:07

## 2023-04-04 RX ADMIN — Medication 40 MILLIEQUIVALENT(S): at 14:26

## 2023-04-04 RX ADMIN — Medication 100 GRAM(S): at 09:08

## 2023-04-04 RX ADMIN — Medication 50 MILLIGRAM(S): at 05:35

## 2023-04-04 RX ADMIN — Medication 40 MILLIGRAM(S): at 17:17

## 2023-04-04 RX ADMIN — Medication 975 MILLIGRAM(S): at 12:05

## 2023-04-04 RX ADMIN — HEPARIN SODIUM 5000 UNIT(S): 5000 INJECTION INTRAVENOUS; SUBCUTANEOUS at 17:17

## 2023-04-04 RX ADMIN — Medication 81 MILLIGRAM(S): at 11:35

## 2023-04-04 RX ADMIN — HEPARIN SODIUM 5000 UNIT(S): 5000 INJECTION INTRAVENOUS; SUBCUTANEOUS at 05:35

## 2023-04-04 RX ADMIN — ATORVASTATIN CALCIUM 20 MILLIGRAM(S): 80 TABLET, FILM COATED ORAL at 21:20

## 2023-04-04 RX ADMIN — Medication 975 MILLIGRAM(S): at 17:17

## 2023-04-04 RX ADMIN — Medication 40 MILLIGRAM(S): at 05:35

## 2023-04-04 RX ADMIN — SERTRALINE 25 MILLIGRAM(S): 25 TABLET, FILM COATED ORAL at 11:36

## 2023-04-04 RX ADMIN — Medication 975 MILLIGRAM(S): at 05:35

## 2023-04-04 NOTE — PROGRESS NOTE ADULT - PROBLEM SELECTOR PLAN 3
-due to acute on chronic systolic HF  - wean off O2 as tolerated  - rest of plan as above
-due to acute on chronic systolic HF  - wean off O2 as tolerated  - rest of plan as above
- wean off O2 as tolerated  - rest of plan as above

## 2023-04-04 NOTE — PROGRESS NOTE ADULT - PROBLEM SELECTOR PLAN 5
- c/w statin    #Dementia w/ Sundowning   - zyprexa 1.25mg prn for severe agitation monitor QTC borderline (~490)   - seroquel 12.5mg QHS QTC borderline (~490)
atraumatic  pain controlled with Tylenol prn  F/U XR of thoracic and lumbar spine
atraumatic  pain controlled with Tylenol prn  XR with degenerative changes; incidental finding of nonspecific nodular soft tissue opacity measuring approximately 1.8 cm in RLL, can pursue CT as outpatient

## 2023-04-04 NOTE — PROGRESS NOTE ADULT - PROBLEM SELECTOR PLAN 8
- heparin subq for dvt ppx    GOC discussed w/ daughter full code for now but will discuss with sibling. She provided HCP form copy placed In chart.
- heparin subq for dvt ppx

## 2023-04-04 NOTE — PROGRESS NOTE ADULT - PROBLEM SELECTOR PLAN 4
- patient likely has RADHA secondary to cardiorenal syndrome  - diuresis as above, urine lytes  - U/S kidneys showed small kidneys; no hydronephrosis  -may have CKD since small kidneys on US.  -creat slightly increased due to lasix IV  - trend Cr
- patient likely has RADHA secondary to cardiorenal syndrome  - diuresis as above,  - U/S kidneys showed small kidneys; no hydronephrosis  -may have CKD since small kidneys on US.  - trend Cr
Assessment:  - patient likely has RADHA secondary to cardiorenal syndrome    Plan:  - diuresis as above, urine lytes  - U/S kidneys pending  - trend Cr

## 2023-04-04 NOTE — PROGRESS NOTE ADULT - PROBLEM SELECTOR PLAN 6
- hold entresto  - c/w metoprolol for cardiac benefit    #Back  Pain  - x-ray thoracic/lumbar given spinal tenderness   - tylenol, flexeril
- c/w statin    #Dementia w/ Sundowning   - zyprexa 1.25mg prn for severe agitation monitor QTC borderline (~490)   - seroquel 12.5mg QHS QTC borderline (~490)
- c/w statin    #Dementia w/ Sundowning   - zyprexa 1.25mg prn for severe agitation monitor QTC borderline (~490)   - seroquel 12.5mg QHS QTC borderline (~490)

## 2023-04-04 NOTE — PROGRESS NOTE ADULT - PROBLEM SELECTOR PLAN 2
- patient found to have + JVD, rales on lung exam, and elevated BNP  - BNP 91376m  - CXR shows pleural effusion on the R  - EF 15% from outside echo as per cards note  - patient on 5L NC; wean as tolerated  - c/w Lasix 40mg IV BID  - monitor I/O closely  - replete electrolytes as needed  - F/U cardiology recs  - echo pending  - hold entresto and farxiga during diuresis
Assessment:  - patient found to have + JVD, rales on lung exam, and elevated BNP  - BNP 65705a  - CXR shows pleural effusion on the R  - EF 15% from outside echo as per cards note  - patient on 5L NC    Plan:  - will start 40mg lasix IV push BID  - monitor I/O closely  - replete electrolytes as needed  - cardiology consult  - echo pending  - hold entresto and farxiga during diuresis
- patient found to have + JVD, rales on lung exam, and elevated BNP (on admission)  - BNP 39010x  - CXR shows pleural effusion on the R  - EF 15% from outside echo as per cards note  - weaning oxygen as tolerates  - c/w Lasix 40mg IV BID; appreciate cardiology recs  - monitor I/O closely  - hypokalemia today due to diuresis, supplement KCl and repeat BMP in AM  - TTE with EF 10%  - hold entresto and farxiga during diuresis

## 2023-04-04 NOTE — PROGRESS NOTE ADULT - PROBLEM SELECTOR PLAN 7
- hold entresto  - c/w metoprolol for cardiac benefit    #Back  Pain  - x-ray thoracic/lumbar given spinal tenderness   - tylenol, flexeril
- heparin subq for dvt ppx    GOC discussed w/ daughter full code for now but will discuss with sibling. She provided HCP form copy placed In chart.
- hold entresto  - c/w metoprolol for cardiac benefit    #Back  Pain  - XR w/degenerative changes  - tylenol, flexeril

## 2023-04-05 ENCOUNTER — TRANSCRIPTION ENCOUNTER (OUTPATIENT)
Age: 88
End: 2023-04-05

## 2023-04-05 VITALS
SYSTOLIC BLOOD PRESSURE: 102 MMHG | TEMPERATURE: 97 F | RESPIRATION RATE: 17 BRPM | OXYGEN SATURATION: 98 % | HEART RATE: 77 BPM | DIASTOLIC BLOOD PRESSURE: 62 MMHG

## 2023-04-05 LAB
ANION GAP SERPL CALC-SCNC: 13 MMOL/L — SIGNIFICANT CHANGE UP (ref 7–14)
ANION GAP SERPL CALC-SCNC: 13 MMOL/L — SIGNIFICANT CHANGE UP (ref 7–14)
BUN SERPL-MCNC: 24 MG/DL — HIGH (ref 7–23)
BUN SERPL-MCNC: 24 MG/DL — HIGH (ref 7–23)
CALCIUM SERPL-MCNC: 9.3 MG/DL — SIGNIFICANT CHANGE UP (ref 8.4–10.5)
CALCIUM SERPL-MCNC: 9.4 MG/DL — SIGNIFICANT CHANGE UP (ref 8.4–10.5)
CHLORIDE SERPL-SCNC: 101 MMOL/L — SIGNIFICANT CHANGE UP (ref 98–107)
CHLORIDE SERPL-SCNC: 101 MMOL/L — SIGNIFICANT CHANGE UP (ref 98–107)
CO2 SERPL-SCNC: 29 MMOL/L — SIGNIFICANT CHANGE UP (ref 22–31)
CO2 SERPL-SCNC: 29 MMOL/L — SIGNIFICANT CHANGE UP (ref 22–31)
CREAT SERPL-MCNC: 1.67 MG/DL — HIGH (ref 0.5–1.3)
CREAT SERPL-MCNC: 1.67 MG/DL — HIGH (ref 0.5–1.3)
EGFR: 29 ML/MIN/1.73M2 — LOW
EGFR: 29 ML/MIN/1.73M2 — LOW
GLUCOSE SERPL-MCNC: 100 MG/DL — HIGH (ref 70–99)
GLUCOSE SERPL-MCNC: 99 MG/DL — SIGNIFICANT CHANGE UP (ref 70–99)
HCT VFR BLD CALC: 40.9 % — SIGNIFICANT CHANGE UP (ref 34.5–45)
HGB BLD-MCNC: 12.2 G/DL — SIGNIFICANT CHANGE UP (ref 11.5–15.5)
MAGNESIUM SERPL-MCNC: 1.9 MG/DL — SIGNIFICANT CHANGE UP (ref 1.6–2.6)
MAGNESIUM SERPL-MCNC: 1.9 MG/DL — SIGNIFICANT CHANGE UP (ref 1.6–2.6)
MCHC RBC-ENTMCNC: 25.3 PG — LOW (ref 27–34)
MCHC RBC-ENTMCNC: 29.8 GM/DL — LOW (ref 32–36)
MCV RBC AUTO: 84.9 FL — SIGNIFICANT CHANGE UP (ref 80–100)
NRBC # BLD: 0 /100 WBCS — SIGNIFICANT CHANGE UP (ref 0–0)
NRBC # FLD: 0 K/UL — SIGNIFICANT CHANGE UP (ref 0–0)
PHOSPHATE SERPL-MCNC: 3.3 MG/DL — SIGNIFICANT CHANGE UP (ref 2.5–4.5)
PLATELET # BLD AUTO: 266 K/UL — SIGNIFICANT CHANGE UP (ref 150–400)
POTASSIUM SERPL-MCNC: 3.9 MMOL/L — SIGNIFICANT CHANGE UP (ref 3.5–5.3)
POTASSIUM SERPL-MCNC: 3.9 MMOL/L — SIGNIFICANT CHANGE UP (ref 3.5–5.3)
POTASSIUM SERPL-SCNC: 3.9 MMOL/L — SIGNIFICANT CHANGE UP (ref 3.5–5.3)
POTASSIUM SERPL-SCNC: 3.9 MMOL/L — SIGNIFICANT CHANGE UP (ref 3.5–5.3)
RBC # BLD: 4.82 M/UL — SIGNIFICANT CHANGE UP (ref 3.8–5.2)
RBC # FLD: 14 % — SIGNIFICANT CHANGE UP (ref 10.3–14.5)
SODIUM SERPL-SCNC: 143 MMOL/L — SIGNIFICANT CHANGE UP (ref 135–145)
SODIUM SERPL-SCNC: 143 MMOL/L — SIGNIFICANT CHANGE UP (ref 135–145)
WBC # BLD: 3.87 K/UL — SIGNIFICANT CHANGE UP (ref 3.8–10.5)
WBC # FLD AUTO: 3.87 K/UL — SIGNIFICANT CHANGE UP (ref 3.8–10.5)

## 2023-04-05 PROCEDURE — 99232 SBSQ HOSP IP/OBS MODERATE 35: CPT

## 2023-04-05 PROCEDURE — 99239 HOSP IP/OBS DSCHRG MGMT >30: CPT

## 2023-04-05 RX ORDER — FUROSEMIDE 40 MG
1 TABLET ORAL
Qty: 30 | Refills: 0
Start: 2023-04-05 | End: 2023-05-04

## 2023-04-05 RX ADMIN — Medication 975 MILLIGRAM(S): at 12:44

## 2023-04-05 RX ADMIN — HEPARIN SODIUM 5000 UNIT(S): 5000 INJECTION INTRAVENOUS; SUBCUTANEOUS at 05:36

## 2023-04-05 RX ADMIN — HEPARIN SODIUM 5000 UNIT(S): 5000 INJECTION INTRAVENOUS; SUBCUTANEOUS at 17:11

## 2023-04-05 RX ADMIN — SERTRALINE 25 MILLIGRAM(S): 25 TABLET, FILM COATED ORAL at 11:20

## 2023-04-05 RX ADMIN — Medication 40 MILLIGRAM(S): at 05:36

## 2023-04-05 RX ADMIN — Medication 81 MILLIGRAM(S): at 11:20

## 2023-04-05 RX ADMIN — Medication 975 MILLIGRAM(S): at 05:35

## 2023-04-05 RX ADMIN — Medication 975 MILLIGRAM(S): at 11:24

## 2023-04-05 NOTE — PHYSICAL THERAPY INITIAL EVALUATION ADULT - PERTINENT HX OF CURRENT PROBLEM, REHAB EVAL
This is a 88 y/o F with pmhx of CHF, CAD, HTN presented to the ED for chest pain. She was treated as a stemi code because they saw MABEL on V1-V4 with LBBB. The patient is a poor historian, however can answer simple questions.

## 2023-04-05 NOTE — PHYSICAL THERAPY INITIAL EVALUATION ADULT - ADDITIONAL COMMENTS
Pt family member in room reporting pt lives in a house with her daughter grandchildren and son in law, a few steps to negotiate, no history of falls, and does not use DME.

## 2023-04-05 NOTE — DISCHARGE NOTE PROVIDER - NSDCCPCAREPLAN_GEN_ALL_CORE_FT
PRINCIPAL DISCHARGE DIAGNOSIS  Diagnosis: Chest pain  Assessment and Plan of Treatment: You presented to the hospital with chest pain. You were evaluated by the cardiology team. You did not have a heart attack. Please continue to see your primary doctor and a cardiologist.      SECONDARY DISCHARGE DIAGNOSES  Diagnosis: Acute low back pain  Assessment and Plan of Treatment: You had back pain in the hospital. X-rays showed that you may have changes in your bones which are due to age. There was a nodule noted on your right lower lung when we performed an x-ray. This may require a CT scan to better determine what this nodule is. This CT scan can be ordered by your primary care doctor.    Diagnosis: Acute systolic heart failure  Assessment and Plan of Treatment: You had fluid in your lungs which caused trouble breathing. This also caused your oxygen levels to drop. These symptoms were due to heart failure. You were seen by the cardiology team and given a medication to remove excess fluid from your lungs. Please continue to see your primary doctor and a cardiologist.    Diagnosis: RADHA (acute kidney injury)  Assessment and Plan of Treatment: You had some kidney injury on labs. This was likely related to your heart failure. Your kidney function stabilized. Please continue to see your primary doctor.     PRINCIPAL DISCHARGE DIAGNOSIS  Diagnosis: Chest pain  Assessment and Plan of Treatment: You presented to the hospital with chest pain. You were evaluated by the cardiology team. You did not have a heart attack. Please continue to see your primary doctor and a cardiologist.      SECONDARY DISCHARGE DIAGNOSES  Diagnosis: Acute systolic heart failure  Assessment and Plan of Treatment: You had fluid in your lungs which caused trouble breathing. This also caused your oxygen levels to drop. These symptoms were due to heart failure. You were seen by the cardiology team and given a medication to remove excess fluid from your lungs. Please continue to see your primary doctor and a cardiologist.   Continue with the medication as prescribed:  - Continue metoprolol as your original home dose  - Can resume farxiga  - HOLD entresto as your blood pressure remained soft (follow up with your primary care provider and cardiologist to see when to resume)   - Continue to take lasix 40mg daily for maintenance of the fluid level    Diagnosis: Acute low back pain  Assessment and Plan of Treatment: You had back pain in the hospital. X-rays showed that you may have changes in your bones which are due to age. There was a nodule noted on your right lower lung when we performed an x-ray. This may require a CT scan to better determine what this nodule is. This CT scan can be ordered by your primary care doctor.    Diagnosis: RADHA (acute kidney injury)  Assessment and Plan of Treatment: You had some kidney injury on labs. This was likely related to your heart failure. Your kidney function stabilized. Please continue to see your primary doctor.

## 2023-04-05 NOTE — DISCHARGE NOTE NURSING/CASE MANAGEMENT/SOCIAL WORK - NSSCNAMETXT_GEN_ALL_CORE
[facility]-this agency will send a nurse to your home to evaluate your care.

## 2023-04-05 NOTE — DISCHARGE NOTE NURSING/CASE MANAGEMENT/SOCIAL WORK - PATIENT PORTAL LINK FT
You can access the FollowMyHealth Patient Portal offered by [facility] by registering at the following website: [email or web address]. By joining [employer]’s FollowMyHealth portal, you will also be able to view your health information using other applications (apps) compatible with our system.

## 2023-04-05 NOTE — DISCHARGE NOTE NURSING/CASE MANAGEMENT/SOCIAL WORK - NSDPLANG ASIS_GEN_ALL_CORE
My - it was good to see you today!    Reviewed the leg test you had 6/2022 indicating mild blockages in legs  Glad that you were able to increase the Zetia to 10 mg FIVE days a week!  Will review cholesterol when it's back  Reviewed that you're no longer on the Isordil as didn't feel good on it    PLAN:  Continue walking program  See Dr. Reno's group 1/2023 for the legs and see Dr. Aragon 5/2023   No
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
## 2023-04-05 NOTE — DISCHARGE NOTE PROVIDER - ATTENDING DISCHARGE PHYSICAL EXAMINATION:
PHYSICAL EXAM (performed Apr 6, 2023)  GENERAL: NAD, lying in bed  HEAD:  Atraumatic, Normocephalic  EYES: EOMI, PERRLA, conjunctiva and sclera clear  NECK: Supple, No elevated JVD  CHEST/LUNG: Clear to auscultation bilaterally; No wheeze  HEART: Regular rate and rhythm; No murmurs, rubs, or gallops  ABDOMEN: Soft, Nontender, Nondistended; Bowel sounds present  EXTREMITIES:  2+ Peripheral Pulses, No clubbing, cyanosis, or edema  PSYCH: AAOx3  NEUROLOGY: CN II-XII grossly intact, moving all extremities  SKIN: No rashes or lesions

## 2023-04-05 NOTE — DISCHARGE NOTE PROVIDER - HOSPITAL COURSE
87 y.o. Female w/ Hx HTN, CAD, chronic systolic CHF p/w for chest pain found to have CHF exacerbation. Patient also  w/ back pain.        Problem/Plan - 1:  ·  Problem: Unstable angina pectoris.   ·  Plan: -patient presented for new onset chest pain, which resolved  - troponins 27, CKMB 2.7 wnl  - EKG shows LBB with 3mm MABEL V1-V4, however did not meet Sgarbossa Criteria  - no events on telemetry monitoring  - cardiology evaluated and had low suspicion for ACS   - TTE reviewed - EF 10%     Problem/Plan - 2:  ·  Problem: Acute systolic heart failure.   ·  Plan: - patient found to have + JVD, rales on lung exam, and elevated BNP (on admission)  - BNP 88527y  - CXR showed pleural effusion on the R  - weaned from oxygen   - diuresed with assistance from cardiology team  - TTE with EF 10%  - held entresto and farxiga during diuresis.     Problem/Plan - 3:  ·  Problem: Acute respiratory failure with hypoxia.   ·  Plan: -due to acute on chronic systolic HF  - weaned off O2     Problem/Plan - 4:  ·  Problem: RADHA (acute kidney injury).   ·  Plan: - patient likely had RADHA secondary to cardiorenal syndrome  - diuresed as above  - U/S kidneys showed small kidneys; no hydronephrosis     Problem/Plan - 5:  ·  Problem: Acute low back pain.   ·  Plan: atraumatic  pain controlled with Tylenol prn  XR with degenerative changes; incidental finding of nonspecific nodular soft tissue opacity measuring approximately 1.8 cm in RLL, can pursue CT as outpatient. 87 y.o. Female w/ Hx HTN, CAD, chronic systolic CHF p/w for chest pain found to have CHF exacerbation. Patient also  w/ back pain.      Problem/Plan - 1:  ·  Problem: Unstable angina pectoris.   ·  Plan: -patient presented for new onset chest pain, which resolved  - troponins 27, CKMB 2.7 wnl  - EKG shows LBB with 3mm MABEL V1-V4, however did not meet Sgarbossa Criteria  - no events on telemetry monitoring  - cardiology evaluated and had low suspicion for ACS   - TTE reviewed - EF 10%     Problem/Plan - 2:  ·  Problem: Acute systolic heart failure.   ·  Plan: - patient found to have + JVD, rales on lung exam, and elevated BNP (on admission)  - BNP 72611u  - CXR showed pleural effusion on the R  - weaned from oxygen   - diuresed with assistance from cardiology team  - TTE with EF 10%  - held entresto and farxiga during diuresis. to be resumed after discharge.     Problem/Plan - 3:  ·  Problem: Acute respiratory failure with hypoxia.   ·  Plan: -due to acute on chronic systolic HF  - weaned off O2     Problem/Plan - 4:  ·  Problem: RADHA (acute kidney injury).   ·  Plan: - patient likely had RADHA secondary to cardiorenal syndrome  - diuresed as above  - U/S kidneys showed small kidneys; no hydronephrosis     Problem/Plan - 5:  ·  Problem: Acute low back pain.   ·  Plan: atraumatic  pain controlled with Tylenol prn  XR with degenerative changes; incidental finding of nonspecific nodular soft tissue opacity measuring approximately 1.8 cm in RLL, can pursue CT as outpatient. 87 y.o. Female w/ Hx HTN, CAD, chronic systolic CHF p/w for chest pain found to have CHF exacerbation. Patient also  w/ back pain.     Hospital course:   Unstable angina pectoris.   -patient presented for new onset chest pain, which resolved  - troponins 27, CKMB 2.7 wnl  - EKG shows LBB with 3mm MABEL V1-V4, however did not meet Sgarbossa Criteria  - no events on telemetry monitoring  - cardiology evaluated and had low suspicion for ACS   - TTE reviewed - EF 10%    Acute systolic heart failure.   - patient found to have + JVD, rales on lung exam, and elevated BNP (on admission)  - BNP 53378t  - CXR showed pleural effusion on the R  - weaned from oxygen   - diuresed with assistance from cardiology team  - TTE with EF 10%  - held entresto and farxiga during diuresis. to be resumed after discharge.    Acute respiratory failure with hypoxia.    -due to acute on chronic systolic HF  - weaned off O2    RADHA (acute kidney injury).   - patient likely had RADHA secondary to cardiorenal syndrome  - diuresed as above  - U/S kidneys showed small kidneys; no hydronephrosis    Acute low back pain.   - atraumatic  - pain controlled with Tylenol prn  - XR with degenerative changes; incidental finding of nonspecific nodular soft tissue opacity measuring approximately 1.8 cm in RLL, can pursue CT as outpatient.    On 4/5/23, case discussed with Dr. Box, pt is medically cleared/stable for discharge to rehab.

## 2023-04-05 NOTE — PROGRESS NOTE ADULT - ASSESSMENT
87F with pmhx of HFrEF (EF 10%), CAD with 1 stent (2006), know LBBB, HTN, HLD, mild dementia, and ataxia present with CP. Pt has been having intermittent chest tightness as well as dyspnea for last few months. EKG found with LBBB (seen with previous EKG), not meeting sgarbossa criteria. Found to be in ADHF. Cardiology consulted for assistance in care.    #Atypical CP- ACS ruled out with trop-hs negative x3, EKG unchanged, overall more consistent with ADHF  - with diuresis pt's chest pain and dyspnea have resolved  - HF plan as below  - c/w home asa, atorvastatin  - c/w metoprolol succinate 50mg daily    #HFrEF: LVEF 10%  - admission probnp [id number]  - during admission with diuresis volume status improved, now euvolemic. Transition to PO lasix 40mg daily for maintenance dose  - c/w metoprolol as above  - monitor strict I's and O's, daily weights while admitted  - resume home farxiga  - continue to hold entresto given BP's 100's/60's  - replete electrolyte to keep K>4, mg>2    At discharge please ensure pt has prescriptions for the above medications and has close follow-up scheduled with her cardiologist in 1-2 weeks post-discharge. Cardiology will sign off at this time, please reconsult with any questions or concerns.    Camilo Guevara MD  Cardiology Fellow - PGY4    Please check [email or web address]: [email or web address] for cardiology service schedule and contact information.   
87F with pmhx of HFrEF (EF 10%), CAD with 1 stent (2006), know LBBB, HTN, HLD, mild dementia, and ataxia present with CP. Pt has been having intermittent chest tightness as well as dyspnea for last few months. EKG found with LBBB (seen with previous EKG), not meeting sgarbossa criteria. Found to be in ADHF. Cardiology consulted for assistance in care.    #Atypical CP- ACS ruled out with trop-hs negative x3, EKG unchanged, overall more consistent with ADHF  - with diuresis pt's chest pain resolved, continues to have dyspnea that's improving  - diuresis and HF plan as below  - c/w home asa, atorvastatin  - c/w home metoprolol 25mg BID    #HFrEF: EF 15-20% from echo on 3/3/23, this admission LVEF 10%  - probnp [id number]  - continues to be hypervolemic on exam, c/w lasix 40mg IV BID today  - c/w home metoprolol as above  - monitor strict I's and O's, daily weights  - holding home entresto, Farxiga given RADHA, resume once Cr stabilizes  - replete electrolyte to keep K>4, mg>2    Camilo Guevara MD  Cardiology Fellow - PGY4    Please check [email or web address]: [email or web address] for cardiology service schedule and contact information.   
87F with pmhx of HFrEF (EF 15-20%), CAD with 1 stent (2006), know LBBB, HTN, HLD, mild dementia, and ataxia present with CP. Pt has been having intermittent chest tightness since Dec. 2022 as per Sarah simmons. EKG found with LBBB (seen with previous EKG), not meeting sgarbossa criteria. Cardiology consulted for CP.    #Atypical CP- ACS ruled out with trop-hs negative x3, EKG unchanged  - probnp [id number], concerning for ADHF  - now with diuresis pt symptoms improving  - close to euvolemic today, c/w lasix 40mg IV BID today, likely transition to PO furosemide 40mg daily for maintenance tomorrow  - c/w home asa, atorvastatin  - c/w home metoprolol 25mg BID    #HFrEF 	(Ef 15-20% from echo on 3/3/23)  - initial exam with evidence of volume overload  - today pt appearing close to euvolemic  - lasix as above  - c/w home metoprolol as above  - monitor strict I's and O's, daily weights  - f/u TTE  - holding home entresto, Farxiga given RADHA, resume once Cr stabilizes  - replete electrolyte to keep K>4, mg>2    Camilo Guevara MD  Cardiology Fellow - PGY4    Please check [email or web address]: [email or web address] for cardiology service schedule and contact information. 
87 y.o. Female w/ Hx HTN, CAD, chronic systolic CHF p/w for chest pain found to have CHF exacerbation. Patient also  w/ back pain. 
86 y/o F with pmhx of CHF, CAD, HTN presented to the ED for chest pain.  Also found to have CHF exacerbation. Admit for CHF exacerbation requiring diuresis. Patient w/ back pain, x-ray ordered. 
87 y.o. Female w/ Hx HTN, CAD, chronic systolic CHF p/w for chest pain found to have CHF exacerbation. Patient also  w/ back pain.

## 2023-04-05 NOTE — PROGRESS NOTE ADULT - SUBJECTIVE AND OBJECTIVE BOX
Cardiology Progress Note  ------------------------------------------------------------------------------------------  SUBJECTIVE:   - No events overnight. States breathing is improving but not back to baseline. Denies CP or Palpitations.   -------------------------------------------------------------------------------------------  ROS:  CV: chest pain (-), palpitation (-), orthopnea (-), PND (-), edema (-)  PULM: SOB (+), cough (-), wheezing (-), hemoptysis (-).   CONST: fever (-), chills (-) or fatigue (-)  GI: abdominal distension (-), abdominal pain (-) , nausea/vomiting (-), hematemesis, (-), melena (-), hematochezia (-)  : dysuria (-), frequency (-), hematuria (-).   NEURO: numbness (-), weakness (-), dizziness (-)  MSK: myalgia (-), joint pain (-)   SKIN: itching (-), rash (-)  HEENT:  visual changes (-); vertigo or throat pain (-);  neck stiffness (-)   Psych: change in mood (-), anxiety (-), depression (-)     All other review of systems is negative unless indicated above.   -------------------------------------------------------------------------------------------  VS:  T(F): 98.2 (-), Max: 98.2 ()  HR: 77 () (77 - 81)  BP: 131/80 () (121/74 - 139/73)  RR: 17 (-)  SpO2: 99% ()  I&O's Summary    PHYSICAL EXAM:  GENERAL: NAD  HEAD:  Atraumatic, Normocephalic.  EYES: EOMI, PERRLA, conjunctiva and sclera clear.  ENT: Moist mucous membranes.  NECK: Supple, No JVD.  CHEST/LUNG: Decreased breath sounds at bases b/l  HEART: Regular rate and rhythm; No murmurs, rubs, or gallops.  ABDOMEN: Bowel sounds present; Soft, Nontender, Nondistended.   EXTREMITIES: No edema. 2+ Peripheral Pulses, brisk capillary refill. No clubbing or cyanosis.   PSYCH: Normal affect.  SKIN: No rashes or lesions.  -------------------------------------------------------------------------------------------  LABS:                          12.1   5.29  )-----------( 247      ( 2023 06:29 )             39.5     04-03    145  |  104  |  22  ----------------------------<  115<H>  3.8   |  27  |  1.69<H>    Ca    9.5      2023 06:29  Phos  4.3     04-03  Mg     1.90     -03    TPro  7.6  /  Alb  4.5  /  TBili  0.9  /  DBili  x   /  AST  73<H>  /  ALT  100<H>  /  AlkPhos  108  04-02    PT/INR - ( 2023 21:04 )   PT: 12.2 sec;   INR: 1.05 ratio         PTT - ( 2023 21:04 )  PTT:30.8 sec  CARDIAC MARKERS ( 2023 12:37 )  49 ng/L / x     / x     / x     / x     / x      CARDIAC MARKERS ( 2023 06:18 )  43 ng/L / x     / x     / 217 U/L / x     / 5.3 ng/mL  CARDIAC MARKERS ( 2023 21:04 )  27 ng/L / x     / x     / 189 U/L / x     / 2.7 ng/mL      Total Cholesterol: 141  LDL: --  HDL: 63  T        -------------------------------------------------------------------------------------------  Meds:  acetaminophen     Tablet .. 975 milliGRAM(s) Oral every 6 hours  aspirin enteric coated 81 milliGRAM(s) Oral daily  atorvastatin 20 milliGRAM(s) Oral at bedtime  cyclobenzaprine 5 milliGRAM(s) Oral three times a day PRN  furosemide   Injectable 40 milliGRAM(s) IV Push two times a day  heparin   Injectable 5000 Unit(s) SubCutaneous every 12 hours  melatonin 3 milliGRAM(s) Oral at bedtime PRN  metoprolol succinate ER 50 milliGRAM(s) Oral daily  OLANZapine Injectable 1.25 milliGRAM(s) IntraMuscular every 8 hours PRN  QUEtiapine 12.5 milliGRAM(s) Oral at bedtime  sertraline 25 milliGRAM(s) Oral daily    -------------------------------------------------------------------------------------------  
Cardiology Progress Note  ------------------------------------------------------------------------------------------  SUBJECTIVE:   - Tele: NSR  - No events overnight. States breathing feels a little improved today compared to yesterday. Denies CP r Palpitations.   -------------------------------------------------------------------------------------------  ROS:  CV: chest pain (-), palpitation (-), orthopnea (-), PND (-), edema (-)  PULM: SOB (+, cough (-), wheezing (-), hemoptysis (-).   CONST: fever (-), chills (-) or fatigue (-)  GI: abdominal distension (-), abdominal pain (-) , nausea/vomiting (-), hematemesis, (-), melena (-), hematochezia (-)  : dysuria (-), frequency (-), hematuria (-).   NEURO: numbness (-), weakness (-), dizziness (-)  MSK: myalgia (-), joint pain (-)   SKIN: itching (-), rash (-)  HEENT:  visual changes (-); vertigo or throat pain (-);  neck stiffness (-)   Psych: change in mood (-), anxiety (-), depression (-)     All other review of systems is negative unless indicated above.   -------------------------------------------------------------------------------------------  VS:  T(F): 98.1 (), Max: 98.2 ()  HR: 64 (-04) (64 - 76)  BP: 119/71 (-04) (119/71 - 125/75)  RR: 18 (-)  SpO2: 100% ()  I&O's Summary    2023 07:01  -  2023 07:00  --------------------------------------------------------  IN: 280 mL / OUT: 0 mL / NET: 280 mL      PHYSICAL EXAM:  PHYSICAL EXAM:  GENERAL: NAD  HEAD:  Atraumatic, Normocephalic.  EYES: EOMI, PERRLA, conjunctiva and sclera clear.  ENT: Moist mucous membranes.  NECK: Supple, No JVD.  CHEST/LUNG: Decreased breath sounds at bases b/l R > L  HEART: Regular rate and rhythm; No murmurs, rubs, or gallops.  ABDOMEN: Bowel sounds present; Soft, Nontender, Nondistended.   EXTREMITIES: No edema. 2+ Peripheral Pulses, brisk capillary refill. No clubbing or cyanosis.   PSYCH: Normal affect.  SKIN: No rashes or lesions.  -------------------------------------------------------------------------------------------  LABS:                          11.9   4.12  )-----------( 241      ( 2023 06:55 )             39.7         145  |  102  |  23  ----------------------------<  87  3.3<L>   |  30  |  1.66<H>    Ca    9.2      2023 06:55  Phos  3.7     04-04  Mg     1.70     04-04        CARDIAC MARKERS ( 2023 12:37 )  49 ng/L / x     / x     / x     / x     / x      CARDIAC MARKERS ( 2023 06:18 )  43 ng/L / x     / x     / 217 U/L / x     / 5.3 ng/mL  CARDIAC MARKERS ( 2023 21:04 )  27 ng/L / x     / x     / 189 U/L / x     / 2.7 ng/mL      Total Cholesterol: 141  LDL: --  HDL: 63  T        -------------------------------------------------------------------------------------------  Meds:  acetaminophen     Tablet .. 975 milliGRAM(s) Oral every 6 hours  aspirin enteric coated 81 milliGRAM(s) Oral daily  atorvastatin 20 milliGRAM(s) Oral at bedtime  cyclobenzaprine 5 milliGRAM(s) Oral three times a day PRN  furosemide   Injectable 40 milliGRAM(s) IV Push two times a day  heparin   Injectable 5000 Unit(s) SubCutaneous every 12 hours  melatonin 3 milliGRAM(s) Oral at bedtime PRN  metoprolol succinate ER 50 milliGRAM(s) Oral daily  OLANZapine Injectable 1.25 milliGRAM(s) IntraMuscular every 8 hours PRN  potassium chloride    Tablet ER 40 milliEquivalent(s) Oral every 4 hours  QUEtiapine 12.5 milliGRAM(s) Oral at bedtime  sertraline 25 milliGRAM(s) Oral daily    -------------------------------------------------------------------------------------------  TTE 4/3/23:  Ejection Fraction (Visual Estimate): 10 %  ------------------------------------------------------------------------  OBSERVATIONS:  Mitral Valve: Mitral annular calcification and tethered  mitral leaflets  Mild-moderate mitral regurgitation.  Aortic Root: Normal aortic root.  Aortic Valve: Calcified trileaflet aortic valve with normal  opening. Mild aorticregurgitation.  Left Atrium: Normal left atrium.  Left Ventricle: Endocardial visualization enhanced with  intravenous injection of echo contrast (Definity). Severe  global left ventricular systolic dysfunction. Severe left  ventricular enlargement.  Right Heart: Severe right atrial enlargement. Right  ventricular enlargement with decreased right ventricular  systolic function. Normal tricuspid valve.  Moderate  tricuspid regurgitation. Normal pulmonic valve. Minimal  pulmonic regurgitation.  Pericardium/PleuraNormal pericardium with no pericardial  effusion. Bilateral pleural effusions.  Hemodynamic: Estimated right ventricular systolic pressure  equals 62 mm Hg, assuming right atrial pressure equals 10  mm Hg, consistent with severe pulmonary hypertension.  ------------------------------------------------------------------------  CONCLUSIONS:  1. Calcified trileaflet aortic valve with normal opening.  Mild aortic regurgitation.  2. Severe left ventricular enlargement.  3. Endocardial visualization enhanced with intravenous  injection of echo contrast (Definity). Severe global left  ventricular systolic dysfunction.  4. Right ventricular enlargement with decreased right  ventricular systolic function.  5. Normal tricuspid valve.  Moderate tricuspid  regurgitation.  6. Estimated pulmonary artery systolic pressure equals 62  mm Hg, assuming right atrial pressure equals 10  mm Hg,  consistent with severe pulmonary hypertension.  7. Bilateral pleural effusions.    -------------------------------------------------------------------------------------------  
INTERVAL HPI/OVERNIGHT EVENTS:  Patient was seen and examined. Patient complaining of shortness of breath, back pain w/o fall trauma, daughter updated bedside provided HCP form. Daughter reports patient has had worsening episodes of confusion while in the hospital. Is normally alert and oriented x 1, sometimes x 2 when she is at home. C/o cold extremities. ROS otherwise negative.    VITAL SIGNS:  T(F): 98 (04-02-23 @ 21:32)  HR: 77 (04-02-23 @ 21:32)  BP: 121/74 (04-02-23 @ 21:32)  RR: 18 (04-02-23 @ 21:32)  SpO2: 100% (04-02-23 @ 21:32)  Wt(kg): --    PHYSICAL EXAM:  GENERAL: NAD, comfortable at bedside  HEAD:  Atraumatic, Normocephalic  EYES: EOMI, conjunctiva and sclera clear  ENT: Moist Mucus Membranes present, no ulcers appreciated  NECK: Supple, + JVD  CHEST/LUNG: rales b/l on all lung fields, no accessory muscle use  HEART: Regular rate and rhythm; No murmurs, rubs, or gallops, (+)S1, S2  ABDOMEN: Soft, Nontender, Nondistended; Normal Bowel sounds   EXTREMITIES:  1+ Peripheral Pulses, No clubbing, cyanosis, or edema. Extremities cold  Back: w/ spinal tenderness t/L spine   PSYCH: normal mood and affect  NEUROLOGY: AAOx1-2, non-focal  SKIN: No rashes     MEDICATIONS  (STANDING):  acetaminophen     Tablet .. 975 milliGRAM(s) Oral every 6 hours  aspirin enteric coated 81 milliGRAM(s) Oral daily  atorvastatin 20 milliGRAM(s) Oral at bedtime  furosemide   Injectable 40 milliGRAM(s) IV Push two times a day  heparin   Injectable 5000 Unit(s) SubCutaneous every 12 hours  metoprolol succinate ER 50 milliGRAM(s) Oral daily  QUEtiapine 12.5 milliGRAM(s) Oral at bedtime  sertraline 25 milliGRAM(s) Oral daily    MEDICATIONS  (PRN):  cyclobenzaprine 5 milliGRAM(s) Oral three times a day PRN Severe Pain  melatonin 3 milliGRAM(s) Oral at bedtime PRN Insomnia  OLANZapine Injectable 1.25 milliGRAM(s) IntraMuscular every 8 hours PRN Severe Agitation      Allergies    No Known Allergies    Intolerances        LABS:                        12.6   5.17  )-----------( 272      ( 02 Apr 2023 06:18 )             41.5     04-02    143  |  103  |  20  ----------------------------<  123<H>  4.2   |  21<L>  |  1.54<H>    Ca    10.0      02 Apr 2023 06:18  Phos  4.5     04-02  Mg     2.10     04-02    TPro  7.6  /  Alb  4.5  /  TBili  0.9  /  DBili  x   /  AST  73<H>  /  ALT  100<H>  /  AlkPhos  108  04-02    PT/INR - ( 01 Apr 2023 21:04 )   PT: 12.2 sec;   INR: 1.05 ratio         PTT - ( 01 Apr 2023 21:04 )  PTT:30.8 sec      RADIOLOGY & ADDITIONAL TESTS:  Reviewed
Cardiology Progress Note  ------------------------------------------------------------------------------------------  SUBJECTIVE:   - Tele: NSR  - No events overnight. States breathing feels back to normal this morning. Denies CP, SOB or Palpitations.   -------------------------------------------------------------------------------------------  ROS:  CV: chest pain (-), palpitation (-), orthopnea (-), PND (-), edema (-)  PULM: SOB (-), cough (-), wheezing (-), hemoptysis (-).   CONST: fever (-), chills (-) or fatigue (-)  GI: abdominal distension (-), abdominal pain (-) , nausea/vomiting (-), hematemesis, (-), melena (-), hematochezia (-)  : dysuria (-), frequency (-), hematuria (-).   NEURO: numbness (-), weakness (-), dizziness (-)  MSK: myalgia (-), joint pain (-)   SKIN: itching (-), rash (-)  HEENT:  visual changes (-); vertigo or throat pain (-);  neck stiffness (-)   Psych: change in mood (-), anxiety (-), depression (-)     All other review of systems is negative unless indicated above.   -------------------------------------------------------------------------------------------  VS:  T(F): 98.1 (04-05), Max: 98.1 (04-05)  HR: 85 (04-05) (62 - 85)  BP: 106/67 (04-05) (104/61 - 118/73)  RR: 17 (04-05)  SpO2: 99% (04-05)  I&O's Summary    04 Apr 2023 07:01  -  05 Apr 2023 07:00  --------------------------------------------------------  IN: 220 mL / OUT: 200 mL / NET: 20 mL      PHYSICAL EXAM:  GENERAL: NAD  HEAD:  Atraumatic, Normocephalic.  EYES: EOMI, PERRLA, conjunctiva and sclera clear.  ENT: Moist mucous membranes.  NECK: Supple, No JVD.  CHEST/LUNG: Clear to auscultation bilaterally; No rales, rhonchi, wheezing, or rubs. Unlabored respirations.  HEART: Regular rate and rhythm; No murmurs, rubs, or gallops.  ABDOMEN: Bowel sounds present; Soft, Nontender, Nondistended.   EXTREMITIES: No edema. 2+ Peripheral Pulses, brisk capillary refill. No clubbing or cyanosis.   PSYCH: Normal affect.  SKIN: No rashes or lesions.  -------------------------------------------------------------------------------------------  LABS:                          12.2   3.87  )-----------( 266      ( 05 Apr 2023 05:20 )             40.9     04-05    143  |  101  |  24<H>  ----------------------------<  99  3.9   |  29  |  1.67<H>    Ca    9.3      05 Apr 2023 05:20  Phos  3.3     04-05  Mg     1.90     04-05        CARDIAC MARKERS ( 02 Apr 2023 12:37 )  49 ng/L / x     / x     / x     / x     / x      CARDIAC MARKERS ( 02 Apr 2023 06:18 )  43 ng/L / x     / x     / 217 U/L / x     / 5.3 ng/mL  CARDIAC MARKERS ( 01 Apr 2023 21:04 )  27 ng/L / x     / x     / 189 U/L / x     / 2.7 ng/mL            -------------------------------------------------------------------------------------------  Meds:  acetaminophen     Tablet .. 975 milliGRAM(s) Oral every 6 hours  aspirin enteric coated 81 milliGRAM(s) Oral daily  atorvastatin 20 milliGRAM(s) Oral at bedtime  cyclobenzaprine 5 milliGRAM(s) Oral three times a day PRN  furosemide   Injectable 40 milliGRAM(s) IV Push two times a day  heparin   Injectable 5000 Unit(s) SubCutaneous every 12 hours  melatonin 3 milliGRAM(s) Oral at bedtime PRN  metoprolol succinate ER 50 milliGRAM(s) Oral daily  OLANZapine Injectable 1.25 milliGRAM(s) IntraMuscular every 8 hours PRN  QUEtiapine 12.5 milliGRAM(s) Oral at bedtime  sertraline 25 milliGRAM(s) Oral daily    -------------------------------------------------------------------------------------------  TTE 4/3/23:  Ejection Fraction (Visual Estimate): 10 %  ------------------------------------------------------------------------  OBSERVATIONS:  Mitral Valve: Mitral annular calcification and tethered  mitral leaflets  Mild-moderate mitral regurgitation.  Aortic Root: Normal aortic root.  Aortic Valve: Calcified trileaflet aortic valve with normal  opening. Mild aorticregurgitation.  Left Atrium: Normal left atrium.  Left Ventricle: Endocardial visualization enhanced with  intravenous injection of echo contrast (Definity). Severe  global left ventricular systolic dysfunction. Severe left  ventricular enlargement.  Right Heart: Severe right atrial enlargement. Right  ventricular enlargement with decreased right ventricular  systolic function. Normal tricuspid valve.  Moderate  tricuspid regurgitation. Normal pulmonic valve. Minimal  pulmonic regurgitation.  Pericardium/PleuraNormal pericardium with no pericardial  effusion. Bilateral pleural effusions.  Hemodynamic: Estimated right ventricular systolic pressure  equals 62 mm Hg, assuming right atrial pressure equals 10  mm Hg, consistent with severe pulmonary hypertension.  ------------------------------------------------------------------------  CONCLUSIONS:  1. Calcified trileaflet aortic valve with normal opening.  Mild aortic regurgitation.  2. Severe left ventricular enlargement.  3. Endocardial visualization enhanced with intravenous  injection of echo contrast (Definity). Severe global left  ventricular systolic dysfunction.  4. Right ventricular enlargement with decreased right  ventricular systolic function.  5. Normal tricuspid valve.  Moderate tricuspid  regurgitation.  6. Estimated pulmonary artery systolic pressure equals 62  mm Hg, assuming right atrial pressure equals 10  mm Hg,  consistent with severe pulmonary hypertension.  7. Bilateral pleural effusions.      -------------------------------------------------------------------------------------------  
Patient is a 87y old  Female who presents with a chief complaint of chest pain (02 Apr 2023 23:06)      SUBJECTIVE / OVERNIGHT EVENTS:  Patient says she feels better. Denies cp, SOB, abdominal pain, N/V/D     MEDICATIONS  (STANDING):  acetaminophen     Tablet .. 975 milliGRAM(s) Oral every 6 hours  aspirin enteric coated 81 milliGRAM(s) Oral daily  atorvastatin 20 milliGRAM(s) Oral at bedtime  furosemide   Injectable 40 milliGRAM(s) IV Push two times a day  heparin   Injectable 5000 Unit(s) SubCutaneous every 12 hours  metoprolol succinate ER 50 milliGRAM(s) Oral daily  QUEtiapine 12.5 milliGRAM(s) Oral at bedtime  sertraline 25 milliGRAM(s) Oral daily    MEDICATIONS  (PRN):  cyclobenzaprine 5 milliGRAM(s) Oral three times a day PRN Severe Pain  melatonin 3 milliGRAM(s) Oral at bedtime PRN Insomnia  OLANZapine Injectable 1.25 milliGRAM(s) IntraMuscular every 8 hours PRN Severe Agitation      Vital Signs Last 24 Hrs  T(C): 36.8 (03 Apr 2023 06:15), Max: 36.8 (03 Apr 2023 06:15)  T(F): 98.2 (03 Apr 2023 06:15), Max: 98.2 (03 Apr 2023 06:15)  HR: 77 (03 Apr 2023 06:15) (77 - 81)  BP: 131/80 (03 Apr 2023 06:15) (121/74 - 139/73)  BP(mean): --  RR: 17 (03 Apr 2023 06:15) (17 - 18)  SpO2: 99% (03 Apr 2023 06:15) (99% - 100%)    Parameters below as of 03 Apr 2023 06:15  Patient On (Oxygen Delivery Method): nasal cannula  O2 Flow (L/min): 2    CAPILLARY BLOOD GLUCOSE        I&O's Summary      PHYSICAL EXAM:   GENERAL: NAD, well-developed  HEAD:  Atraumatic, Normocephalic  EYES: EOMI, PERRLA, conjunctiva and sclera clear  NECK: Supple, +JVD  CHEST/LUNG: bibasilar crackles; No wheeze  HEART: Regular rate and rhythm; No murmurs, rubs, or gallops  ABDOMEN: Soft, Nontender, Nondistended; Bowel sounds present  EXTREMITIES:  2+ Peripheral Pulses, No clubbing, cyanosis, or edema  back: thoracic and lumbar spinal tenderness  PSYCH: AAOx3, [place]  NEUROLOGY: non-focal  SKIN: No rashes or lesions    LABS:                        12.1   5.29  )-----------( 247      ( 03 Apr 2023 06:29 )             39.5     04-03    145  |  104  |  22  ----------------------------<  115<H>  3.8   |  27  |  1.69<H>    Ca    9.5      03 Apr 2023 06:29  Phos  4.3     04-03  Mg     1.90     04-03    TPro  7.6  /  Alb  4.5  /  TBili  0.9  /  DBili  x   /  AST  73<H>  /  ALT  100<H>  /  AlkPhos  108  04-02    PT/INR - ( 01 Apr 2023 21:04 )   PT: 12.2 sec;   INR: 1.05 ratio         PTT - ( 01 Apr 2023 21:04 )  PTT:30.8 sec  CARDIAC MARKERS ( 02 Apr 2023 06:18 )  x     / x     / 217 U/L / x     / 5.3 ng/mL  CARDIAC MARKERS ( 01 Apr 2023 21:04 )  x     / x     / 189 U/L / x     / 2.7 ng/mL          RADIOLOGY & ADDITIONAL TESTS:    Imaging Personally Reviewed: < from: US Kidney and Bladder (04.02.23 @ 10:42) >  IMPRESSION:  The kidneys are small in size without hydronephrosis.    Simple appearing right renal cysts.    < end of copied text >      Consultant(s) Notes Reviewed:      Care Discussed with Consultants/Other Providers:  
Patient is a 87y old  Female who presents with a chief complaint of chest pain (04 Apr 2023 09:46)    SUBJECTIVE / OVERNIGHT EVENTS:    No events overnight. This AM, patient without n/v/d/cp/sob. Niece at bedside.    MEDICATIONS  (STANDING):  acetaminophen     Tablet .. 975 milliGRAM(s) Oral every 6 hours  aspirin enteric coated 81 milliGRAM(s) Oral daily  atorvastatin 20 milliGRAM(s) Oral at bedtime  furosemide   Injectable 40 milliGRAM(s) IV Push two times a day  heparin   Injectable 5000 Unit(s) SubCutaneous every 12 hours  metoprolol succinate ER 50 milliGRAM(s) Oral daily  potassium chloride    Tablet ER 40 milliEquivalent(s) Oral every 4 hours  QUEtiapine 12.5 milliGRAM(s) Oral at bedtime  sertraline 25 milliGRAM(s) Oral daily    MEDICATIONS  (PRN):  cyclobenzaprine 5 milliGRAM(s) Oral three times a day PRN Severe Pain  melatonin 3 milliGRAM(s) Oral at bedtime PRN Insomnia  OLANZapine Injectable 1.25 milliGRAM(s) IntraMuscular every 8 hours PRN Severe Agitation      PHYSICAL EXAM:  T(C): 36.3 (04-04-23 @ 11:15), Max: 36.8 (04-03-23 @ 18:00)  HR: 62 (04-04-23 @ 11:15) (62 - 76)  BP: 104/61 (04-04-23 @ 11:15) (104/61 - 125/75)  RR: 18 (04-04-23 @ 11:15) (17 - 18)  SpO2: 100% (04-04-23 @ 11:15) (100% - 100%)  I&O's Summary    03 Apr 2023 07:01  -  04 Apr 2023 07:00  --------------------------------------------------------  IN: 280 mL / OUT: 0 mL / NET: 280 mL      GENERAL: NAD, lying in bed  HEAD:  Atraumatic, Normocephalic, MMM  CHEST/LUNG: No use of accessory muscles, breathing non-labored, using O2 via NC; Reduced breath sounds on inspiration over R lung fields, clear lung fields on left  COR: RR, no mrcg  ABD: Soft, ND/NT, +BS  PSYCH: AAOx3  NEUROLOGY: CN II-XII grossly intact, moving all extremities  SKIN: No rashes or lesions  EXT: wwp, no cce    LABS:  CAPILLARY BLOOD GLUCOSE                              11.9   4.12  )-----------( 241      ( 04 Apr 2023 06:55 )             39.7     04-04    145  |  102  |  23  ----------------------------<  87  3.3<L>   |  30  |  1.66<H>    Ca    9.2      04 Apr 2023 06:55  Phos  3.7     04-04  Mg     1.70     04-04                  RADIOLOGY & ADDITIONAL TESTS:    Telemetry Personally Reviewed - NSR    Imaging Personally Reviewed -     Imaging Reviewed - < from: Transthoracic Echocardiogram (04.03.23 @ 14:55) >  CONCLUSIONS:  1. Calcified trileaflet aortic valve with normal opening.  Mild aortic regurgitation.  2. Severe left ventricular enlargement.  3. Endocardial visualization enhanced with intravenous  injection of echo contrast (Definity). Severe global left  ventricular systolic dysfunction.  4. Right ventricular enlargement with decreased right  ventricular systolic function.  5. Normal tricuspid valve.  Moderate tricuspid  regurgitation.  6. Estimated pulmonary artery systolic pressure equals 62  mm Hg, assuming right atrial pressure equals 10  mm Hg,  consistent with severe pulmonary hypertension.  7. Bilateral pleural effusions.    < end of copied text >      Consultant(s) Notes Reviewed -   cardiology    Care Discussed with Consultants/Other Providers -

## 2023-04-06 ENCOUNTER — NON-APPOINTMENT (OUTPATIENT)
Age: 88
End: 2023-04-06

## 2023-04-14 ENCOUNTER — APPOINTMENT (OUTPATIENT)
Dept: GERIATRICS | Facility: CLINIC | Age: 88
End: 2023-04-14
Payer: MEDICARE

## 2023-04-14 VITALS
SYSTOLIC BLOOD PRESSURE: 110 MMHG | WEIGHT: 127 LBS | BODY MASS INDEX: 21.68 KG/M2 | HEIGHT: 64 IN | TEMPERATURE: 97.3 F | DIASTOLIC BLOOD PRESSURE: 72 MMHG | OXYGEN SATURATION: 94 % | HEART RATE: 74 BPM | RESPIRATION RATE: 16 BRPM

## 2023-04-14 DIAGNOSIS — R93.89 ABNORMAL FINDINGS ON DIAGNOSTIC IMAGING OF OTHER SPECIFIED BODY STRUCTURES: ICD-10-CM

## 2023-04-14 PROCEDURE — 99495 TRANSJ CARE MGMT MOD F2F 14D: CPT

## 2023-04-14 NOTE — END OF VISIT
[Time Spent: ___ minutes] : I have spent [unfilled] minutes of time on the encounter. [] : Resident [[email or web address]] : I have personally seen and examined this patient. I have fully participated in the care of this patient. I have reviewed all pertinent clinical information, including history, physical exam, plan, and Dr. Lugo's note and have made changes to the note above as needed.\par \par HFrEF\par Advanced Dementia\par Depressed mood\par Poor appetite\par \par f/u with cards as planned \par f/u at next visit for further GOCD\par

## 2023-04-14 NOTE — HISTORY OF PRESENT ILLNESS
[Patient reported hearing was abnormal] : Patient reported hearing was abnormal [Patient declined colon rectal/cancer screening] : Patient declined colon/rectal cancer screening [Patient declined breast sonogram] : Patient declined breast sonogram [Patient declined cervical cancer screening] : Patient declined cervical cancer screening [One fall no injury in past year] : Patient reported one fall in the past year without injury [Independent] : transferring/mobility [Completely Dependent] : Completely dependent. [Full assistance needed] : Assistance needed managing medications [FAST Score: ____] : Functional Assessment Scale (FAST) Score: [unfilled] [Smoke Detector] : smoke detector [Carbon Monoxide Detector] : carbon monoxide detector [Grab Bars] : grab bars [Night Light] : night light [Wears Seat Belt] : wears seat belt [Other reason not done] : Other reason not done [Mild] : Stage: Mild [Stable] : Status: Stable [Memory Lapses Or Loss] : stable memory impairment [Patient Observed To Be Agitated] : denies agitation [Hostility Toward Caregivers] : denies aggression [Sleep Disturbances] : denies sleep disturbances [] : denies wandering [Fixed Beliefs Contradicted By Reality (Delusions)] : denies delusions [Difficulty Finding Desired Words] : denies difficulty finding desired words [Designated Healthcare Proxy] : Designated healthcare proxy [FreeTextEntry1] : Seen by cards Dr. Egan on 3/24/23 - visit note appreciated in EMR\par Seen by neuro Dr. Robles on 3/29/23 - visit note appreciated in EMR - FDG PET ordered , holding off on ACHEi \par \par Hospitalized at [facility] since last visit. Discharge summary reviewed: \par Discharge Date 05-Apr-2023\par Admission Date 01-Apr-2023 \par \par Unstable Angina\par Acute Systolic HF\par Acute Hypoxic Resp failure d/t CHF exacerbation - weaned off O2 supp\par RADHA likely d/t cardiorenal s/o \par - XR with degenerative changes; incidental finding of nonspecific nodular soft\par tissue opacity measuring approximately 1.8 cm in RLL, can pursue CT as\par outpatient.\par TTE w/ LVEF 10%\par - BNP 89750d\par - CXR showed pleural effusion on the R\par - weaned from oxygen\par - Rec - HOLD entresto as your blood pressure remained soft (follow up with your\par primary care provider and cardiologist to see when to resume)\par - Continue to take lasix 40mg daily for maintenance of the fluid level\par \par Discharge Medications \par aspirin 81 mg oral delayed release tablet: 1 tab(s)\par orally once a day\par atorvastatin 20 mg oral tablet: 1 tab(s) orally once a day (at bedtime)\par Farxiga 10 mg oral tablet: 1 tab(s) orally once a day\par Lasix 40 mg oral tablet: 1 tab(s) orally once a day\par metoprolol succinate 25 mg oral tablet, extended release: 2 tab(s) orally once\par a day\par sertraline 25 mg oral tablet: 1 tab(s) orally once a day \par \par \par TODAY patient reports persistent chest heaviness and LBP. Patient is a poor historian d/t baseline memory loss. \par \par Decreased activity and ability to do ADLs since last visit.  \par \par Persistent chest heaviness, fatigue, VILLELA (few steps). Unable to do previous tasks such as folding laundry, is napping more often during the day, and less interested in previous hobbies (puzzles, music, television) w/ times of feeling down and tearful. Pt appetite worsened, eating significantly less compared to bl. Of note pt w/ upcoming PET scan end of month per neurology. \par \par Off zoloft since January '23 per family  - tapered by family d/t mood was previously better\par \par BPS: depressed mood since hospitalization \par \par Appetite: decreased\par \par - Motor Syx: chronic back pain, sometimes off balance and "shaky" when walking.  \par Ambulates unassisted. \par Last fall in 2/21 - when getting up to go to the bathroom in the middle of the night. No injury. \par \par - Sleep:  denies problems, sleeps approx 10hrs/night \par \par DEMENTIA / DEPRESSION / ANXIETY \par - 11/21: Dx: Forgetfullness started approx 2016.  Sometimes forgets family member names.  Forgets her age, dates, forgets where placed glasses few minutes ago. Slow progression over past several years. Sometimes left alone at home but not for more than 1 hr.  NO longer cooks but used to. \par \par - MOCA 11/ 30 w/ NP Gurwinder on 11/1/22 \par - Previous cognitive testing: MoCA 7/30 on 9/8/21 w/ neuro Dr. Robles \par - MRI HEAD 10/26/21: b/l scattered small vessel white matter ischemic changes. \par \par [x ] Neuro Dr. Melita Robles\par [ ] Psych\par \par HFrEF / HTN / HLD / CAD s/p PCI w/ stent ~2013 \par - Follows w/ cards Dr. López\par - on ASA, statin, BB, Farxiga\par - Previously on Entresto - held d/t low BP durign hos [PapSmeardate] : 6/21 [TextBox_31] : had hearing loss but was told would not benefit from HAs d/t might "make her more confused"  [BoneDensityDate] : 3/22  [TextBox_37] : follows w/ optho Dr. Ca Camacho regularly  [FreeTextEntry7] : had [employer] Covid vaccine x 2, previously had flu vaccien without complications [Guns at Home] : no guns at home [Driving Concerns] : not driving or driving without noted concerns [facility] >12  [de-identified] : PHQ2 of 2 on 11/8/22  [AdvancecareDate] : 4/14/23 [FreeTextEntry4] : HCP form on file: primary HCP is dtr Sarah, alternate is granddaughter Justa. \par . 6 children - Raudel, Moshe, Flo, Wilfrid, Sarah, Harvinder\par MOLST not on file - pending completion

## 2023-04-14 NOTE — PHYSICAL EXAM
[No Clubbing, Cyanosis] : no clubbing or cyanosis of the fingernails [Involuntary Movements] : no involuntary movements were seen [Motor Tone] : muscle strength and tone were normal [Normal] : normal skin color and pigmentation [No Focal Deficits] : no focal deficits [Normal Affect] : the affect was normal [Normal Mood] : the mood was normal [Normal Outer Ear/Nose] : the ears and nose were normal in appearance [Normal Hearing] : hearing was not normal [Normal Gait] : abnormal gait [Normal Insight/Judgment] : insight and judgment were not intact [de-identified] : KAYLAH [de-identified] : slow steady cautious gait  [de-identified] : calm, cooperative  [de-identified] : confused

## 2023-04-14 NOTE — REASON FOR VISIT
[Family Member] : family member [Post Hospitalization] : a post hospitalization visit [FreeTextEntry1] : HFrEF 10%, dementia [FreeTextEntry3] : granddaughter Justa  [FreeTextEntry2] : who assists with history d/t pt limited historian d/t baseline memory loss

## 2023-04-14 NOTE — ASSESSMENT
[FreeTextEntry1] : Discharge summary reviewed\par f/u with cards as planned next week\par Monitor daily weights advised - notify MD if any increase in wt >3lbs\par \par Discussed option of holding off on PET Scan at this time - pending f/u with cards and further discussion about r/b/a of use of Aricept/Namenda and further GOC discussion.  At this time suspect possibly risks>benefits of starting dementia meds\par \par Discussed trial of Remeron for mood, appetite - r/b/a discussed and granddaughter wishes to try\par Monitor for oversedation during daytime\par Fall precautions\par \par Tylenol for chronic LBP - no more than 3grams/day\par BP wnl today - would hold Entresto for now, f/u with cards as planned \par \par - WOuld benefit from formal help at home\par - c/w 24/7 supervision for safety and assistance w/ ADLs\par - Referred to  for additional counseling, education, support, resources\par \par f/u for further GOCD at next visit \par \par f/u with WILEY Hernandez for ADC program in 1 mo\par AWV\par \par f/u with me in 3 mo, unless earlier PRN \par

## 2023-04-16 ENCOUNTER — NON-APPOINTMENT (OUTPATIENT)
Age: 88
End: 2023-04-16

## 2023-04-17 ENCOUNTER — RX RENEWAL (OUTPATIENT)
Age: 88
End: 2023-04-17

## 2023-04-19 ENCOUNTER — NON-APPOINTMENT (OUTPATIENT)
Age: 88
End: 2023-04-19

## 2023-04-19 ENCOUNTER — APPOINTMENT (OUTPATIENT)
Dept: CARDIOLOGY | Facility: CLINIC | Age: 88
End: 2023-04-19
Payer: MEDICARE

## 2023-04-19 VITALS
HEART RATE: 65 BPM | SYSTOLIC BLOOD PRESSURE: 116 MMHG | HEIGHT: 64 IN | OXYGEN SATURATION: 99 % | WEIGHT: 124 LBS | BODY MASS INDEX: 21.17 KG/M2 | DIASTOLIC BLOOD PRESSURE: 64 MMHG | RESPIRATION RATE: 17 BRPM

## 2023-04-19 PROCEDURE — 99215 OFFICE O/P EST HI 40 MIN: CPT

## 2023-04-19 PROCEDURE — 93000 ELECTROCARDIOGRAM COMPLETE: CPT

## 2023-04-19 NOTE — PHYSICAL EXAM
[Well Developed] : well developed [No Acute Distress] : no acute distress [Well Nourished] : well nourished [Normal Conjunctiva] : normal conjunctiva [No Carotid Bruit] : no carotid bruit [Normal Venous Pressure] : normal venous pressure [Normal S1, S2] : normal S1, S2 [No Murmur] : no murmur [No Rub] : no rub [No Gallop] : no gallop [Clear Lung Fields] : clear lung fields [Good Air Entry] : good air entry [No Respiratory Distress] : no respiratory distress  [Soft] : abdomen soft [Non Tender] : non-tender [No Masses/organomegaly] : no masses/organomegaly [Normal Bowel Sounds] : normal bowel sounds [Normal Gait] : normal gait [No Edema] : no edema [No Cyanosis] : no cyanosis [No Clubbing] : no clubbing [No Varicosities] : no varicosities [No Rash] : no rash [No Skin Lesions] : no skin lesions [Moves all extremities] : moves all extremities [No Focal Deficits] : no focal deficits [Normal Speech] : normal speech [Alert and Oriented] : alert and oriented [Normal memory] : normal memory

## 2023-04-20 NOTE — DISCUSSION/SUMMARY
[EKG obtained to assist in diagnosis and management of assessed problem(s)] : EKG obtained to assist in diagnosis and management of assessed problem(s) [FreeTextEntry1] : Patient is an 87 year-old Black woman with multiple cardiovascular risk factors and known coronary artery disease, presents for evaluation of a LBBB seen on ECG.\par She has no acute cardiac complaints.\par She has dementia that is mild, but progressive. \par \par Continue ASA and statin therapy for patient with known coronary artery disease status post PCI.\par \par For patient with severely reduced LVEF, continue beta-blocker and SGLT-2 inhibitor. \par Recommend restarting low dose Entresto.\par Will refer to EP for consideration of CRT-P for patient with LBBB and severely reduced LVEF.\par

## 2023-04-20 NOTE — REASON FOR VISIT
[Arrhythmia/ECG Abnorrmalities] : arrhythmia/ECG abnormalities [Coronary Artery Disease] : coronary artery disease [Other: _____] : [unfilled] [FreeTextEntry1] : April 2023 - Patient returns today for follow-up after recent hospitalization for chest discomfort. She was seen here by Dr. Egan on 3/24/2023 and started on Entresto 24-26 mg BID. One week later, she was admitted to [facility] with chest pain that improved with diuresis. There was no evidence of acute coronary syndrome. She has felt well since discharge. Of note, she was discharged off her Entresto because of relative hypotension.

## 2023-04-20 NOTE — HISTORY OF PRESENT ILLNESS
[FreeTextEntry1] : Patient is an 87 year-old Black woman who has recently moved and is changing doctors. She has a known past medical history of hypertension, dyslipidemia, coronary artery disease status post PCI (2013), with known dementia, who was recently noted to have a LBBB on her ECG.\par She has no new cardiovascular complaints. \par She continues walking and dancing for exercise. \par \par February 2023 - Patient was in [place] in December and January. While she was there, she had chest pain and went to the hospital where she was told she had a heart attack. She was given clopidogrel 75 mg and sildenafil 50 mg as new prescriptions, but they have not been continued.\par She is currently maintained on sertraline 25 mg daily, ASA 81 mg daily, atorvastatin 20 mg daily, and metoprolol succinate 25 mg daily.\par

## 2023-04-20 NOTE — CARDIOLOGY SUMMARY
[de-identified] : 3/14/2022, sinus rhythm, 68 bpm, LBBB\par 5/18/2022, sinus rhythm, 69 bpm, LBBB [de-identified] : 1/9/2023 in [place] - moderate MR, moderate-severe TR, pulmonary hypertension, septal and lateral wall motion abnormality, LVEF 35%\par 3/3/2023, dilated LA, severe pulmonary hypertension, PASP 64 mmHg, severe global LV dysfunction, LVEF 15-20%

## 2023-04-26 ENCOUNTER — APPOINTMENT (OUTPATIENT)
Dept: NUCLEAR MEDICINE | Facility: IMAGING CENTER | Age: 88
End: 2023-04-26
Payer: MEDICARE

## 2023-04-26 ENCOUNTER — OUTPATIENT (OUTPATIENT)
Dept: OUTPATIENT SERVICES | Facility: HOSPITAL | Age: 88
LOS: 1 days | End: 2023-04-26
Payer: COMMERCIAL

## 2023-04-26 DIAGNOSIS — F03.90 UNSPECIFIED DEMENTIA, UNSPECIFIED SEVERITY, WITHOUT BEHAVIORAL DISTURBANCE, PSYCHOTIC DISTURBANCE, MOOD DISTURBANCE, AND ANXIETY: ICD-10-CM

## 2023-04-26 PROCEDURE — 78608 BRAIN IMAGING (PET): CPT | Mod: 26

## 2023-04-26 RX ORDER — SACUBITRIL AND VALSARTAN 24; 26 MG/1; MG/1
1 TABLET, FILM COATED ORAL
Refills: 0 | DISCHARGE

## 2023-04-28 ENCOUNTER — RESULT REVIEW (OUTPATIENT)
Age: 88
End: 2023-04-28

## 2023-04-28 PROCEDURE — 78608 BRAIN IMAGING (PET): CPT

## 2023-04-28 PROCEDURE — 78999 UNLISTED MISC PX DX NUC MED: CPT

## 2023-04-28 PROCEDURE — 78999 UNLISTED MISC PX DX NUC MED: CPT | Mod: 26

## 2023-04-28 PROCEDURE — A9552: CPT

## 2023-05-11 ENCOUNTER — APPOINTMENT (OUTPATIENT)
Dept: ELECTROPHYSIOLOGY | Facility: CLINIC | Age: 88
End: 2023-05-11
Payer: MEDICARE

## 2023-05-11 ENCOUNTER — NON-APPOINTMENT (OUTPATIENT)
Age: 88
End: 2023-05-11

## 2023-05-11 VITALS
HEIGHT: 64 IN | DIASTOLIC BLOOD PRESSURE: 70 MMHG | OXYGEN SATURATION: 100 % | WEIGHT: 124 LBS | SYSTOLIC BLOOD PRESSURE: 118 MMHG | BODY MASS INDEX: 21.17 KG/M2 | HEART RATE: 71 BPM

## 2023-05-11 PROCEDURE — 93000 ELECTROCARDIOGRAM COMPLETE: CPT

## 2023-05-11 PROCEDURE — 99204 OFFICE O/P NEW MOD 45 MIN: CPT

## 2023-05-11 NOTE — HISTORY OF PRESENT ILLNESS
[FreeTextEntry1] : Gloria Maldonado is an 87 year old woman with hypertension, hyperlipidemia, coronary artery disease (status post a percutaneous coronary intervention in 2013), dementia (at baseline AO x 1-2), a severe cardiomyopathy (left ventricular systolic function 15-20% from 3/3/2023, severe pulmonary hypertension, moderate to severe mitral regurgitation) and a complete left bundle branch who presents today for an initial evaluation. \par \par The patient's current medications include Farxiga and Toprol XL 50mg daily. She was not able to tolerate Entresto previously due to hypotension. She has not reported any symptoms recently. Her daughters report that she occasionally walks around a block and needs to take 1-2 breaks while doing so. She walks unassisted. She sleeps with two pillows and denies paroxysmal nocturnal dyspnea or lower extremity swelling. She was hospitalized at [facility] in April for chest pressure and shortness of breath. She was also hospitalized in [place] for similar symptoms. She denies having these symptoms presently. \par \par Unable to obtain ROS given the patient's dementia

## 2023-05-11 NOTE — CARDIOLOGY SUMMARY
[de-identified] : ECG from 5/11/2023: Sinus rhythm at 68 bpm with TX: 164ms, complete LBBB (QRSd: 169ms)\par ECG from 4/19/2023: Sinus with TX: 162ms, complete LBBB (QRSd: 168ms) [de-identified] : TTE from 3/3/2023: LA: 4.0, RAMÓN: 48, EF: 15-20% (visual estimate), severe global LVSD, eccentric LVH, LVIDd: 6.2, moderate to severe MR, normal RA, normal RV size and function, mod TR, min PI, no pericardial effusion, severe pHTN (RVSP: 64)

## 2023-05-11 NOTE — REASON FOR VISIT
[Family Member] : family member [FreeTextEntry3] : Dr. Anand López [FreeTextEntry1] : Her two granddaughters came with her to the visit

## 2023-05-11 NOTE — DISCUSSION/SUMMARY
[Cardiomyopathy] : cardiomyopathy [EKG obtained to assist in diagnosis and management of assessed problem(s)] : EKG obtained to assist in diagnosis and management of assessed problem(s) [FreeTextEntry4] : Complete LBBB [FreeTextEntry1] : Gloria Maldonado is an 87 year old woman with hypertension, hyperlipidemia, coronary artery disease (status post a percutaneous coronary intervention in 2013), dementia (at baseline AO x 1-2), a severe cardiomyopathy (left ventricular systolic function 15-20% from 3/3/2023, severe pulmonary hypertension, moderate to severe mitral regurgitation) and a complete left bundle branch. She had had two recent admissions for acute on chronic heart failure with a reduced ejection fraction. She has symptoms consistent with NYHA Class III. Her medication uptitration has been limited by hypotension. \par \par Given her overall clinical picture with her cardiomyopathy, complete left bundle branch block with duration more than 150ms and dementia I would favor a CRT-P over a CRT-D. I spoke with the patient and her family at length about the implant procedure. The potential benefits of resynchronization and the risks of the implant procedure including, but not limited to bleeding, infection, pneumothorax, perforation, phrenic capture and lead dislodgement.\par \par The patient's family will call the office if they wish to move forward with scheduling the CRT-P implant

## 2023-05-11 NOTE — PHYSICAL EXAM
[Well Developed] : well developed [Well Nourished] : well nourished [Normal Conjunctiva] : normal conjunctiva [Normal Venous Pressure] : normal venous pressure [Normal S1, S2] : normal S1, S2 [No Murmur] : no murmur [No Rub] : no rub [No Gallop] : no gallop [Clear Lung Fields] : clear lung fields [Good Air Entry] : good air entry [No Respiratory Distress] : no respiratory distress  [Soft] : abdomen soft [Non Tender] : non-tender [Normal Gait] : normal gait [No Edema] : no edema [No Rash] : no rash [No Skin Lesions] : no skin lesions [Moves all extremities] : moves all extremities [No Focal Deficits] : no focal deficits [Normal Speech] : normal speech [de-identified] : Hard of hearing [de-identified] : AO x 1-2

## 2023-05-23 ENCOUNTER — APPOINTMENT (OUTPATIENT)
Dept: GERIATRICS | Facility: CLINIC | Age: 88
End: 2023-05-23
Payer: MEDICARE

## 2023-05-23 VITALS
WEIGHT: 128 LBS | SYSTOLIC BLOOD PRESSURE: 120 MMHG | BODY MASS INDEX: 21.85 KG/M2 | HEART RATE: 71 BPM | DIASTOLIC BLOOD PRESSURE: 70 MMHG | TEMPERATURE: 97.5 F | OXYGEN SATURATION: 95 % | HEIGHT: 64 IN | RESPIRATION RATE: 16 BRPM

## 2023-05-23 PROBLEM — I50.9 HEART FAILURE, UNSPECIFIED: Chronic | Status: ACTIVE | Noted: 2023-04-01

## 2023-05-23 PROBLEM — I25.10 ATHEROSCLEROTIC HEART DISEASE OF NATIVE CORONARY ARTERY WITHOUT ANGINA PECTORIS: Chronic | Status: ACTIVE | Noted: 2023-04-01

## 2023-05-23 PROBLEM — I10 ESSENTIAL (PRIMARY) HYPERTENSION: Chronic | Status: ACTIVE | Noted: 2023-04-01

## 2023-05-23 PROCEDURE — 99215 OFFICE O/P EST HI 40 MIN: CPT

## 2023-05-23 PROCEDURE — 99497 ADVNCD CARE PLAN 30 MIN: CPT

## 2023-05-23 RX ORDER — SACUBITRIL AND VALSARTAN 49; 51 MG/1; MG/1
TABLET, FILM COATED ORAL
Refills: 0 | Status: DISCONTINUED | COMMUNITY
End: 2023-04-14

## 2023-05-23 NOTE — ASSESSMENT
[FreeTextEntry1] : Social Care Plan\par 1. Respite: \par \par -Provided hotline contact to [employer] [phone].\par -provided [facility] contact to assist with resources and caregiver support. \par -Provided link to www. communityresourcefinder.org\par -provided [facility] agency \par -contact to ANTONIETA Lewis.\par \par \par 2. Caregiver Education:\par Sent e-mail to -\par \par I wanted to share useful tools to help manage your family member's dementia behaviors. I have included caregiver training videos from [facility] to help guide you and caretakers, as well as a daily care plan from the [employer]. Maintaining a schedule and a structured day helps patients with dementia. I am available if you need any further assistance or have any questions. See below:\par 1.Agitation & Anxiety:\par  https://Cahootify.be/[email or web address]\par 2. Safety\par 3. Alzheimer's [employer] Daily Care Plan:\par  https://www.alz.org/help-support/caregiving/daily-care/daily-care-plan\par 4. Educated daughter  behaviors occur to acknowledge patient's emotions and redirect behavior with distractions, address physical needs. Provided examples. Provided examples. Daughter agreed to try this non-pharmacological approach. \par \par \par \par Look through books, picture albums, coloring books, painting, puzzles, play with ball/balloon, listen to music, stress ball, fold small items.\par \par 3. [employer] Community Resource Finder \par    www.alz.org/nca/helping you/community-resource-finder \par \par \par 4. Caregiver support:\par -contact to  \par \par 4. Safety: \par -Pt. is accompanied 24/7 and reports safety concerns are not an issue at the moment.  Continue will fall safety precautions. \par \par 5. Caregiver stress:\par -.Counseled on non-pharmacological interventions for stress which include medication saritha (Calm), daily physical activity. \par 6. PT with [facility]\par 7. [facility] education folder attached PFD. \par \par \par \par -ADC acuity RED, goc, f/u in 1m. \par -available for urgent visits and as needed by phone. \par -I have spent 30 minutes sending education resources and coordination of care for the patient regarding dementia \par -direct patient time 10:30am-11am\par -coordination of care 15 min\par -excluding ACP discussion \par \par \par  Mary Purdy, FNP-BC

## 2023-05-23 NOTE — PHYSICAL EXAM
[Alert] : alert [Well Nourished] : well nourished [Well Developed] : well developed [Sclera] : the sclera and conjunctiva were normal [Normal Oral Mucosa] : normal oral mucosa [No Oral Pallor] : no oral pallor [Normal Outer Ear/Nose] : the ears and nose were normal in appearance [Normal Appearance] : the appearance of the neck was normal [No Respiratory Distress] : no respiratory distress [No Acc Muscle Use] : no accessory muscle use [Respiration, Rhythm And Depth] : normal respiratory rhythm and effort [Auscultation Breath Sounds / Voice Sounds] : lungs were clear to auscultation bilaterally [Normal PMI] : the apical impulse was abnormal [Normal S1, S2] : normal S1 and S2 [Heart Rate And Rhythm] : heart rate was normal and rhythm regular [Edema] : edema was not present [Bowel Sounds] : normal bowel sounds [Abdomen Soft] : soft [Cervical Lymph Nodes Enlarged Posterior Bilaterally] : posterior cervical [Supraclavicular Lymph Nodes Enlarged Bilaterally] : supraclavicular [Cervical Lymph Nodes Enlarged Anterior Bilaterally] : anterior cervical, supraclavicular [Axillary Lymph Nodes Enlarged Bilaterally] : axillary [No CVA Tenderness] : no CVA  tenderness [No Spinal Tenderness] : no spinal tenderness [] : no rash [No Focal Deficits] : no focal deficits [Normal Affect] : the affect was normal [Normal Mood] : the mood was normal [___ / 5] : Visuospatial / Executive: [unfilled] / 5 [0 / 0] : Memory: 0 / 0 [___ / 3] : Attention (Serial 7 subtraction): [unfilled] / 3 [___ / 1] : Fluency: [unfilled] / 1 [___ / 2] : Abstraction: [unfilled] / 2 [___ / 5] : Delayed Recall: [unfilled] / 5 [___ / 6] : Orientation: [unfilled] / 6 [MocaTotal] : 11 [de-identified] : elderly female, well dressed,interactive, answers appropriately, smiled, A&O x1 [FreeTextEntry1] : wearing glasses [de-identified] : midline abdomen scar noted on LUQ,LLQ, not painful to touch [de-identified] : ataxic gait [de-identified] : smiled

## 2023-05-23 NOTE — HISTORY OF PRESENT ILLNESS
[No falls in past year] : Patient reported no falls in the past year [Completely Independent] : Completely independent. [Patient is independent with] : bathing [Independent] : transferring/mobility [Full assistance needed] : Assistance needed managing medications [] : Assistance needed managing finances. [FAST Score: ____] : Functional Assessment Scale (FAST) Score: [unfilled] [Night Light] : night light [Anti-Slip Measures] : anti-slip measures [I have developed a follow-up plan documented below in the note.] : I have developed a follow-up plan documented below in the note. [Mild] : Stage: Mild [Worse] : Status: Worse [Reviewed no changes] : Reviewed, no changes [Designated Healthcare Proxy] : Designated healthcare proxy [Name: ___] : Health Care Proxy's Name: [unfilled]  [I will adhere to the patient's wishes.] : I will adhere to the patient's wishes. [FreeTextEntry1] : ADC Program ID:[id number]\par \par Ms. BOO PRASAD is an 87 yo F with PMhx of dementia, CAD, HTN, HLD, gait instability presents for follow up visit  Alzheimer's and Dementia Program visit referred by MD Child for comanagement of dementia-related issues and coordination of dementia care.  \par Pt. presents granddaughter (Justa) who assisted with hx intake due to pt.'s dementia. \par \par #dementia\par -sleeping well\par -denies behaviors\par -no HHA in place\par -24/7 supervision w/ family \par -cameras in place\par -medicaid not in place\par \par #caregiver burden \par -Justa going back to work,  \par -has a 2 yo son\par -Sarah has intermittent FMLA\par \par #HF\par -appreciated Dr. Sherman EP cardiologist note 5/11/23;severe cardiomyopathy LV systolic function 15-20%,complete LBBB, 2 admissions for acute on chronic HF NYHA class III\par -restarted on entresto,tolerating well\par -on 800ml fluid restriction and daily weights, stable reports \par \par \par #HCM\par -follows up closely with Dr. Child pcp, next saritha 7/2023\par \par \par #ACP\par - Justa stated "she would want to go back to her home"\par -introduced MOLST form \par -introduced hospice \par -pending call from ANTONIETA Nieto, and ACP videos \par \par Plan\par -continue goc\par -rec medicaid saritha\par -PT and OT at home rx\par -task sent to  ANTONIETA Nieto \par \par ADC acuity RED, goc, f/u in 1m. \par \par (11/1/23)\par #dementia\par - gets agitated when talking about [place] "not as often as it use to" \par -in the summer time was not sundowning, but now occasionally, once every few weeks \par -doing cross word puzzles\par -often asks "wants to go home" \par -getting meals, "very happy" \par -dark curtains helping pt. sleep in longer \par -MoCA 10+1 education=11 11/1/22\par \par #caregiver burden \par -granddaughter cares for infant and grandmother \par \par #HCM\par -follows up closely with Dr. Child\par \par \par #ACP\par -pt. stated "my daughter can make decisions for me, then my granddaughter"\par \par \par Plan \par -completed HCP today \par -advised f/u w/ Dr. Robles, trial donepezil? family deferred today,printed education \par -medical alert bracelet \par -needs to schedule pcp visit;assisted today\par -aricept info print out \par -flu shot today \par -alz ass rapid referral re finances\par -PT and OT rx today, coordinated care\par -[facility] \par -crisis center at [facility]  contact given,pcp and obgyn contact given to granddaughter \par -letters for daughter's  job; 11/29-1/2/22 to care for mom \par \par ACD acuity YELLOW,CG burden, f/u in 2m. \par \par \par \par \par (1/22/22)\par #dementia\par -I'm doing Ok"\par -sleeping OK\par -wakes up with a lot of pain \par -wakes up and days she's dizzy \par -always wants to go home,counseled  \par -was doing really well with PT, completed \par \par #depression\par -no interest of doing anything \par -poor appetite \par -always full \par -not eating well with meals \par -use to love to dance, now does not want to be around music \par -no SI\par \par #dizziness/low BP  \par -daughter stopped her lisinopril 20mg 1/1/22 because pt. kept complaining of dizziness\par -not able to reach PCP easily \par \par #chronic pain\par -not taking pain medications often, "only when really needed"\par -advised to  tylenol daily, not to wait for pain\par \par #HCM\par -not happy with previous PCP, had only seen Dr. Hernandes 1-2x\par -will establish care with Dr. Child\par \par \par Goal \par -primary care visit asap \par -contact SNAP queens\par -OT with [facility] faxed today\par -SSRI trial \par \par \par Denies pain. Denies acute visits/falls. Denies HA/dizziness, SOB/CP, abdominal pain, dysuria, reports daily BMs regular. \par \par \par (11/17/21)\par #dementia\par -appreciated Dr. Child note 11/1/2021\par -memory symptoms started approx 2016\par -appreciated Dr. Robles  note MoCA 6/30 +1 education= 7/30 on 9/8/21\par -confusion, repetitive with questions\par -forgetful with days of the week \par -forgetful with everything, forgets family members names\par -will not be returning to [employer] anytime soon, previously retired there\par -sleeping well \par -cries, frustrated last 15 minutes then redirectable\par -stays home, and just does puzzles\par -GD stated maybe "mild depression"\par -back from [place] in June 2021\par -retired 25 years ago, was traveling back and forth to [facility] since then. \par -likes to dance, pt. stated "dancing" \par -not interested in going to a program, "prefer to stay home and relax unless its a party"\par -giving her Boost shakes, 1/day, counseled to continue with shakes \par -counseled on antipsychotics risks vs benefits,behaviors manageable with non-pharmacological strategies at the moment\par \par \par #caregiver burden\par -causing stress;anxiety \par -daughter works FT, has to take off to take mom to doctor \par -GD is on maternity leave, currently pregnant, she is a  \par \par \par #[place]\par -reports hearing problems\par -ENT advised hearing aids may not be helpful with understanding words,reviewed note\par -Dr. Robles advised \par - used pocket talker in office today, pt. responded well to it \par \par #falls/knee and back pain\par -would like to try PT with [facility],rx today\par -for chronic knee and back pain, daughter reports they do not with OTC pain meds, relaxes and goes away.\par -counseled on tx pain, ES tylenol and lidocaine patches \par \par Denies pain. Denies acute visits/falls. Denies HA/dizziness, SOB/CP, abdominal pain, dysuria, reports daily BMs regular. \par \par \par Goals\par -[place],hearing aids\par -PT\par -intermittent FMLA for Sarah \par -medicaid family HHA program, needs to meet with ANTONIETA Lewis\par \par \par \par Family hx\par -sister had memory impairment\par *CAREGIVER 1: \par Name/Relationship: Sarah Carrasco,daughter\par Involvement in care:\par Mailing Address:[address] [place] [address]\par Phone Number:[phone]\par E-mail:\par Best time to reach this person: evening\par Patient permission to contact this person:yes\par \par CAREGIVER 2: \par Name/Relationship:Justa Hebert,granddaughter\par Involvement in care:lives with pt.\par Mailing Address:\par Phone Number:[phone]\par E-mail:\par Best time to reach this person: \par Patient permission to contact this person:yes\par \par Primary Care Physician:Dr. Child\par Physicians:\par Neurologists:Dr. Melita Robles  fax [phone]\par ENT: Dr. Sae Talamantes, advised hearing aids, not gotten them \par \par \par \par Suspect Medications (associated with mental status changes): no\par Recent hospitalization/ ED Visits/ Nursing Home Stays:no\par \par Social History:\par Primary Language:English \par Marital Status:\par Children:6 kids, main caretakers, 2 daughters here in [place]. Sons are in [place] and [place]. 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[employer], seamstress\par Activity/Exercise:no exercise \par Alcohol:no \par Sexually active: no\par Driving Habits:no \par Firearms:no\par \par Living Situation:\par Housing (stairs/levels): single family, multi story 3 floors \par Length at Residence:a few months\par Lives with:daughter and granddaughter\par Caregivers (non-paid and paid):\par Safety Concerns: none \par Wandering:no, no cameras\par Falls:last fall in 2/2021, no injury, back pain \par Fear of Falling: no\par \par Financial Situation: "SS that’s it"\par -difficult time with finances \par \par Advance Directives:\par HCP/Advance Directives/Living will: Sarah Carrasco,daughter [phone]\par HCP/Alternate Decision Maker:Justa\par MOLST: would like us to allow natural death "yes, when the time comes" stated pt. \par What matters most? "my health" \par - Justa stated "she would want to go back to her home"\par -introduced MOLST form \par -introduced hospice \par -pending call from ANTONIETA Nieto, and ACP videos \par  [Grab Bars] : no grab bars [Shower Chair] : no shower chair [Driving Concerns] : not driving or driving without noted concerns [de-identified] : 6 [de-identified] : dials for her [de-identified] : 0 [de-identified] : none, needs assistive devices  [de-identified] : counseled on grab bars  [CornellScale] : 6 11/1/22 [FreeTextEntry] : 5 [de-identified] : 2 [NPI-QTotalScore] : 7 [de-identified] : anxiety worst behaviors, redirectable [Time Spent: ___ minutes] : Time Spent: [unfilled] minutes [AdvancecareDate] : 5/23/23 [FreeTextEntry4] : \par Advance Directives:\par HCP/Advance Directives/Living will: Sarah Carrasco,daughter [phone]\par HCP/Alternate Decision Maker:Justa\par MOLST: would like us to allow natural death "yes, when the time comes" stated pt. \par What matters most? "my health" \par - Justa stated "she would want to go back to her home"\par -introduced MOLST form \par -introduced hospice \par -pending call from ANTONIETA Nieto, and ACP videos

## 2023-05-23 NOTE — SOCIAL HISTORY
[With family] : lives with family [Female] : Female [FreeTextEntry1] : Justa Hebert [FreeTextEntry4] : granddaughter [CaregiverPHQ-9Score] : 7 [CaregiverMSCIScore] : 16 [CaregiverDBSScore] : 25

## 2023-05-23 NOTE — REVIEW OF SYSTEMS
[Feeling Poorly] : not feeling poorly [Feeling Tired] : not feeling tired [Sore Throat] : no sore throat [Chest Pain] : no chest pain [Palpitations] : no palpitations [Shortness Of Breath] : no shortness of breath [Cough] : no cough [SOB on Exertion] : no shortness of breath during exertion [Constipation] : no constipation [Diarrhea] : no diarrhea [Incontinence] : no incontinence [de-identified] : reported large circular scar on abdomen from childhood

## 2023-05-24 ENCOUNTER — NON-APPOINTMENT (OUTPATIENT)
Age: 88
End: 2023-05-24

## 2023-06-08 ENCOUNTER — RX RENEWAL (OUTPATIENT)
Age: 88
End: 2023-06-08

## 2023-06-09 ENCOUNTER — TRANSCRIPTION ENCOUNTER (OUTPATIENT)
Age: 88
End: 2023-06-09

## 2023-06-20 ENCOUNTER — TRANSCRIPTION ENCOUNTER (OUTPATIENT)
Age: 88
End: 2023-06-20

## 2023-06-27 ENCOUNTER — APPOINTMENT (OUTPATIENT)
Dept: GERIATRICS | Facility: CLINIC | Age: 88
End: 2023-06-27
Payer: MEDICARE

## 2023-06-27 PROCEDURE — 99497 ADVNCD CARE PLAN 30 MIN: CPT

## 2023-06-27 NOTE — GOALS
[Staff: _____] : staff - [unfilled] [Healthcare proxy document is in chart] : Healthcare proxy document is in chart [DNR/DNI] : DNR/DNI [MOLST Completed] : MOLST Completed [Time Spent: ___ minutes] : I, personally, spent [unfilled] minutes on advance care planning services with the patient.  This time is separate and distinct from any other care management services provided on this date [FreeTextEntry1] : Justa Hebert [FreeTextEntry2] : granddaughter [FreeTextEntry3] : [phone] [FreeTextEntry6] : Sarah Carrasco  [FreeTextEntry5] : daughter [FreeTextEntry7] : previously had educated on MOLST form and ready to completed. Daughter Sarah deferred decisions to Hyacinth, granddaughter. \par Sarah was able to join call for summary and agreed with all the decisions. \par \par completed MOLST, and introduced hospice. \par \par -DNR/DNI \par -limited medical interventions\par -send to the hospital if necessary\par -no feeding tube\par -trial of IV fluids\par -determine use or limitation of antibiotics\par -do not use dialysis \par \par Will mail original and 3 copies and scan to pt. chart. \par \par Also discussed pt. feeling down again. She is not completing her puzzles like she previously did.  Reports she was doing better on sertraline. Sertraline was tappered off because pt.'s mood improved,  increased mirtazapine to 7.5mg today. Would also recommend restarting Sertraline. Will discuss with Dr. Child.  [FreeTextEntry8] : Sarah Carrasco  [FreeTextEntry9] : daughter [de-identified] : [phone]

## 2023-06-30 ENCOUNTER — APPOINTMENT (OUTPATIENT)
Dept: GERIATRICS | Facility: CLINIC | Age: 88
End: 2023-06-30
Payer: MEDICARE

## 2023-06-30 VITALS
RESPIRATION RATE: 14 BRPM | WEIGHT: 121 LBS | SYSTOLIC BLOOD PRESSURE: 114 MMHG | TEMPERATURE: 98.1 F | BODY MASS INDEX: 20.77 KG/M2 | OXYGEN SATURATION: 87 % | HEART RATE: 71 BPM | DIASTOLIC BLOOD PRESSURE: 71 MMHG

## 2023-06-30 PROCEDURE — 99214 OFFICE O/P EST MOD 30 MIN: CPT

## 2023-06-30 NOTE — REASON FOR VISIT
[Post Hospitalization] : a post hospitalization visit [Family Member] : family member [FreeTextEntry1] : weight gain, HFrEF, dementia [FreeTextEntry3] : granddaughter Justa  [FreeTextEntry2] : who assists with history d/t pt limited historian d/t baseline memory loss

## 2023-06-30 NOTE — HISTORY OF PRESENT ILLNESS
[Patient reported hearing was abnormal] : Patient reported hearing was abnormal [Patient declined colon rectal/cancer screening] : Patient declined colon/rectal cancer screening [Patient declined breast sonogram] : Patient declined breast sonogram [Patient declined cervical cancer screening] : Patient declined cervical cancer screening [One fall no injury in past year] : Patient reported one fall in the past year without injury [Independent] : transferring/mobility [Completely Dependent] : Completely dependent. [Full assistance needed] : Assistance needed managing medications [FAST Score: ____] : Functional Assessment Scale (FAST) Score: [unfilled] [Smoke Detector] : smoke detector [Carbon Monoxide Detector] : carbon monoxide detector [Grab Bars] : grab bars [Night Light] : night light [Wears Seat Belt] : wears seat belt [Other reason not done] : Other reason not done [Mild] : Stage: Mild [Stable] : Status: Stable [Memory Lapses Or Loss] : stable memory impairment [Patient Observed To Be Agitated] : denies agitation [Hostility Toward Caregivers] : denies aggression [Sleep Disturbances] : denies sleep disturbances [] : denies wandering [Fixed Beliefs Contradicted By Reality (Delusions)] : denies delusions [Difficulty Finding Desired Words] : denies difficulty finding desired words [Designated Healthcare Proxy] : Designated healthcare proxy [FreeTextEntry1] : \par TODAY patient reports feels well at this time and denies having any complaints however poor historian d/t baseline memory loss. \par \par Justa reports pt c/o chest heaviness and feeling tired this morning. \par \par Wt 127 yesterday morning.  Took extra dose of Lasix yesterday and wt decreased to 123 lbs this morning. \par \par Back on Mirtazapine and recently  increased for depressed mood.\par \par BPS: mood is down \par \par Appetite: decreased - c/o chest heaviness and unclear if exacerbated by eating?\par \par - Motor Syx: chronic back pain, sometimes off balance and "shaky" when walking.  \par Ambulates unassisted. \par Last fall in 2/21 - when getting up to go to the bathroom in the middle of the night. No injury. \par \par - Sleep:  ok\par \par DEMENTIA / DEPRESSION / ANXIETY \par - 11/21: Dx: Forgetfullness started approx 2016.  Sometimes forgets family member names.  Forgets her age, dates, forgets where placed glasses few minutes ago. Slow progression over past several years. Sometimes left alone at home but not for more than 1 hr.  NO longer cooks but used to. \par \par - MOCA 11/ 30 w/ NP Gurwinder on 11/1/22 \par - Previous cognitive testing: MoCA 7/30 on 9/8/21 w/ neuro Dr. Robles \par - MRI HEAD 10/26/21: b/l scattered small vessel white matter ischemic changes. \par \par [x ] Neuro Dr. Melita Robles\par [ ] Psych\par \par HFrEF / HTN / HLD / CAD s/p PCI w/ stent ~2013 \par - Follows w/ cards Dr. López\par - on ASA, statin, BB, Farxiga\par - Previously on Entresto - held d/t low BP durign hos [PapSmeardate] : 6/21 [TextBox_31] : had hearing loss but was told would not benefit from HAs d/t might "make her more confused"  [BoneDensityDate] : 3/22  [TextBox_37] : follows w/ optho Dr. Ca Camacho regularly  [FreeTextEntry7] : had [employer] Covid vaccine x 2, previously had flu vaccien without complications [Guns at Home] : no guns at home [Driving Concerns] : not driving or driving without noted concerns [facility]] : >12  [de-identified] : PHQ2 of 2 on 11/8/22  [AdvancecareDate] : 4/14/23 [FreeTextEntry4] : HCP form on file: primary HCP is dtr Sarah, alternate is granddaughter Justa. \par . 6 children - Raudel, Moshe, Flo, Wilfrid, Sarah, Harvinder\par MOLST not on file - pending completion

## 2023-06-30 NOTE — PHYSICAL EXAM
[Normal Outer Ear/Nose] : the ears and nose were normal in appearance [No Clubbing, Cyanosis] : no clubbing or cyanosis of the fingernails [Involuntary Movements] : no involuntary movements were seen [Motor Tone] : muscle strength and tone were normal [Normal] : normal skin color and pigmentation [No Focal Deficits] : no focal deficits [Normal Affect] : the affect was normal [Normal Mood] : the mood was normal [Normal Hearing] : hearing was not normal [Normal Gait] : abnormal gait [Normal Insight/Judgment] : insight and judgment were not intact [de-identified] : KAYLAH [de-identified] : slow steady cautious gait  [de-identified] : confused [de-identified] : calm, cooperative

## 2023-06-30 NOTE — ASSESSMENT
[FreeTextEntry1] : Wt trending down - monitor daily\par Will repeat labs\par cards f/u currently scheduled for next month.  Family is leaning toward declining cardiac device implant and c/w medical/conservative management of symptoms. \par f/u with cards adv\par \par Discussed Remeron could be contributing to fatigue.  Unclear if worsening depression or progression of dementia/chronic medical problems leading to depressed mood/appetite.  Discusse r/b/a and decide to c/w same for now but will consider trial taper in future. \par \par Fall precautions\par Tylenol for chronic LBP - no more than 3grams/day\par BP wnl today - c/w antihypertensives as per cards \par \par - c/w 24/7 supervision for safety and assistance w/ ADLs\par \par GOC discussed, MOLST reviewed \par \par AWV\par \par f/u with me in 1 mo as scheduled, unless earlier PRN \par

## 2023-07-01 LAB
ALBUMIN SERPL ELPH-MCNC: 4.6 G/DL
ALP BLD-CCNC: 49 U/L
ALT SERPL-CCNC: 40 U/L
ANION GAP SERPL CALC-SCNC: 16 MMOL/L
AST SERPL-CCNC: 36 U/L
BILIRUB SERPL-MCNC: 0.4 MG/DL
BUN SERPL-MCNC: 38 MG/DL
CALCIUM SERPL-MCNC: 10.2 MG/DL
CHLORIDE SERPL-SCNC: 109 MMOL/L
CO2 SERPL-SCNC: 27 MMOL/L
CREAT SERPL-MCNC: 2.23 MG/DL
EGFR: 21 ML/MIN/1.73M2
GLUCOSE SERPL-MCNC: 90 MG/DL
NT-PROBNP SERPL-MCNC: 3035 PG/ML
POTASSIUM SERPL-SCNC: 3.8 MMOL/L
PROT SERPL-MCNC: 7.7 G/DL
SODIUM SERPL-SCNC: 152 MMOL/L

## 2023-07-05 LAB
ALBUMIN SERPL ELPH-MCNC: 4.3 G/DL
ALP BLD-CCNC: 43 U/L
ALT SERPL-CCNC: 42 U/L
ANION GAP SERPL CALC-SCNC: 12 MMOL/L
AST SERPL-CCNC: 43 U/L
BILIRUB SERPL-MCNC: 0.5 MG/DL
BUN SERPL-MCNC: 35 MG/DL
CALCIUM SERPL-MCNC: 9.8 MG/DL
CHLORIDE SERPL-SCNC: 107 MMOL/L
CO2 SERPL-SCNC: 29 MMOL/L
CREAT SERPL-MCNC: 2.04 MG/DL
EGFR: 23 ML/MIN/1.73M2
GLUCOSE SERPL-MCNC: 94 MG/DL
NT-PROBNP SERPL-MCNC: 1701 PG/ML
POTASSIUM SERPL-SCNC: 4 MMOL/L
PROT SERPL-MCNC: 7.2 G/DL
SODIUM SERPL-SCNC: 148 MMOL/L

## 2023-07-06 ENCOUNTER — LABORATORY RESULT (OUTPATIENT)
Age: 88
End: 2023-07-06

## 2023-07-06 ENCOUNTER — NON-APPOINTMENT (OUTPATIENT)
Age: 88
End: 2023-07-06

## 2023-07-06 ENCOUNTER — APPOINTMENT (OUTPATIENT)
Dept: CARDIOLOGY | Facility: CLINIC | Age: 88
End: 2023-07-06
Payer: MEDICARE

## 2023-07-06 VITALS
SYSTOLIC BLOOD PRESSURE: 130 MMHG | BODY MASS INDEX: 21.68 KG/M2 | HEIGHT: 64 IN | DIASTOLIC BLOOD PRESSURE: 70 MMHG | HEART RATE: 68 BPM | WEIGHT: 127 LBS | OXYGEN SATURATION: 97 %

## 2023-07-06 PROCEDURE — 93000 ELECTROCARDIOGRAM COMPLETE: CPT

## 2023-07-06 PROCEDURE — 99215 OFFICE O/P EST HI 40 MIN: CPT

## 2023-07-06 NOTE — REASON FOR VISIT
[Arrhythmia/ECG Abnorrmalities] : arrhythmia/ECG abnormalities [Coronary Artery Disease] : coronary artery disease [Other: _____] : [unfilled] [FreeTextEntry1] : July 2023 - Patient returns today for follow-up. Her weight has been up by approximately 2-3 lbs this week. She was given more diuretics, but then labs suggested she was dehydrated and she reduced her dosing for the rest of the week. Repeat labs showed interval improvement in renal function, but creatinine remained above her baseline.\par She reported easy fatigability, but no shortness of breath or notable edema.

## 2023-07-06 NOTE — HISTORY OF PRESENT ILLNESS
[FreeTextEntry1] : Patient is an 87 year-old Black woman who has recently moved and is changing doctors. She has a known past medical history of hypertension, dyslipidemia, coronary artery disease status post PCI (2013), with known dementia, who was recently noted to have a LBBB on her ECG.\par She has no new cardiovascular complaints. \par She continues walking and dancing for exercise. \par \par February 2023 - Patient was in [place] in December and January. While she was there, she had chest pain and went to the hospital where she was told she had a heart attack. She was given clopidogrel 75 mg and sildenafil 50 mg as new prescriptions, but they have not been continued.\par She is currently maintained on sertraline 25 mg daily, ASA 81 mg daily, atorvastatin 20 mg daily, and metoprolol succinate 25 mg daily.\par \par April 2023 - Patient returns today for follow-up after recent hospitalization for chest discomfort. She was seen here by Dr. Egan on 3/24/2023 and started on Entresto 24-26 mg BID. One week later, she was admitted to [facility] with chest pain that improved with diuresis. There was no evidence of acute coronary syndrome. She has felt well since discharge. Of note, she was discharged off her Entresto because of relative hypotension. \par

## 2023-07-06 NOTE — CARDIOLOGY SUMMARY
[de-identified] : 3/14/2022, sinus rhythm, 68 bpm, LBBB\par 5/18/2022, sinus rhythm, 69 bpm, LBBB [de-identified] : 1/9/2023 in [place] - moderate MR, moderate-severe TR, pulmonary hypertension, septal and lateral wall motion abnormality, LVEF 35%\par 3/3/2023, dilated LA, severe pulmonary hypertension, PASP 64 mmHg, severe global LV dysfunction, LVEF 15-20%

## 2023-07-06 NOTE — DISCUSSION/SUMMARY
[FreeTextEntry1] : Patient is an 87 year-old Black woman with multiple cardiovascular risk factors and known coronary artery disease, presents for evaluation of a LBBB seen on ECG.\par She has no acute cardiac complaints.\par She has dementia that is mild, but progressive. \par \par Continue ASA and statin therapy for patient with known coronary artery disease status post PCI.\par \par For patient with severely reduced LVEF, continue beta-blocker and SGLT-2 inhibitor. \par Recommend continuing low dose Entresto.\par Referred to EP for consideration of CRT-P for patient with LBBB and severely reduced LVEF. Patient and her family have chosen to defer elective PPM implant at this time.\par  [EKG obtained to assist in diagnosis and management of assessed problem(s)] : EKG obtained to assist in diagnosis and management of assessed problem(s)

## 2023-07-07 LAB
ALBUMIN SERPL ELPH-MCNC: 4.4 G/DL
ALP BLD-CCNC: 48 U/L
ALT SERPL-CCNC: 63 U/L
ANION GAP SERPL CALC-SCNC: 13 MMOL/L
AST SERPL-CCNC: 68 U/L
BILIRUB SERPL-MCNC: 0.3 MG/DL
BUN SERPL-MCNC: 34 MG/DL
CALCIUM SERPL-MCNC: 9.9 MG/DL
CHLORIDE SERPL-SCNC: 107 MMOL/L
CO2 SERPL-SCNC: 28 MMOL/L
CREAT SERPL-MCNC: 1.83 MG/DL
EGFR: 26 ML/MIN/1.73M2
GLUCOSE SERPL-MCNC: 83 MG/DL
NT-PROBNP SERPL-MCNC: 2692 PG/ML
POTASSIUM SERPL-SCNC: 4.7 MMOL/L
PROT SERPL-MCNC: 7.3 G/DL
SODIUM SERPL-SCNC: 148 MMOL/L

## 2023-07-14 ENCOUNTER — APPOINTMENT (OUTPATIENT)
Dept: GERIATRICS | Facility: CLINIC | Age: 88
End: 2023-07-14

## 2023-07-18 ENCOUNTER — RX RENEWAL (OUTPATIENT)
Age: 88
End: 2023-07-18

## 2023-07-20 ENCOUNTER — NON-APPOINTMENT (OUTPATIENT)
Age: 88
End: 2023-07-20

## 2023-07-20 ENCOUNTER — APPOINTMENT (OUTPATIENT)
Dept: CARDIOLOGY | Facility: CLINIC | Age: 88
End: 2023-07-20
Payer: MEDICARE

## 2023-07-20 VITALS
SYSTOLIC BLOOD PRESSURE: 115 MMHG | DIASTOLIC BLOOD PRESSURE: 63 MMHG | BODY MASS INDEX: 21.11 KG/M2 | HEART RATE: 75 BPM | OXYGEN SATURATION: 96 % | WEIGHT: 123 LBS

## 2023-07-20 PROCEDURE — 99214 OFFICE O/P EST MOD 30 MIN: CPT

## 2023-07-20 PROCEDURE — 93000 ELECTROCARDIOGRAM COMPLETE: CPT

## 2023-07-20 NOTE — DISCUSSION/SUMMARY
[EKG obtained to assist in diagnosis and management of assessed problem(s)] : EKG obtained to assist in diagnosis and management of assessed problem(s) [FreeTextEntry1] : Patient is an 88 year-old Black woman with multiple cardiovascular risk factors and known coronary artery disease, presents for evaluation of a LBBB seen on ECG.\par She has no acute cardiac complaints.\par She has dementia that is mild, but progressive. \par \par Continue ASA and statin therapy for patient with known coronary artery disease status post PCI.\par \par For patient with severely reduced LVEF, continue beta-blocker and SGLT-2 inhibitor. \par Recommend continuing low dose Entresto.\par Referred to EP for consideration of CRT-P for patient with LBBB and severely reduced LVEF. Patient and her family have chosen to defer elective PPM implant at this time.

## 2023-07-20 NOTE — REASON FOR VISIT
[Arrhythmia/ECG Abnorrmalities] : arrhythmia/ECG abnormalities [Coronary Artery Disease] : coronary artery disease [Other: _____] : [unfilled] [FreeTextEntry1] : July 2023 - Patient returns today for follow-up. Her weight has been up by approximately 2-3 lbs this week. She was given more diuretics, but then labs suggested she was dehydrated and she reduced her dosing for the rest of the week. Repeat labs showed interval improvement in renal function, but creatinine remained above her baseline.\par She reported easy fatigability, but no shortness of breath or notable edema. \par \par July 20, 2023 - She returns today for follow-up two weeks after her last visit. She has continued to lose weight. She is now 122 lbs on her home scale. She has been taking Ensure nutritional supplements.

## 2023-07-20 NOTE — HISTORY OF PRESENT ILLNESS
[FreeTextEntry1] : Patient is an 87 year-old Black woman who has recently moved and is changing doctors. She has a known past medical history of hypertension, dyslipidemia, coronary artery disease status post PCI (2013), with known dementia, who was recently noted to have a LBBB on her ECG.\par She has no new cardiovascular complaints. \par She continues walking and dancing for exercise. \par \par February 2023 - Patient was in [place] in December and January. While she was there, she had chest pain and went to the hospital where she was told she had a heart attack. She was given clopidogrel 75 mg and sildenafil 50 mg as new prescriptions, but they have not been continued.\par She is currently maintained on sertraline 25 mg daily, ASA 81 mg daily, atorvastatin 20 mg daily, and metoprolol succinate 25 mg daily.\par \par April 2023 - Patient returns today for follow-up after recent hospitalization for chest discomfort. She was seen here by Dr. Egan on 3/24/2023 and started on Entresto 24-26 mg BID. One week later, she was admitted to [facility] with chest pain that improved with diuresis. There was no evidence of acute coronary syndrome. She has felt well since discharge. Of note, she was discharged off her Entresto because of relative hypotension. \par

## 2023-07-20 NOTE — CARDIOLOGY SUMMARY
[de-identified] : 3/14/2022, sinus rhythm, 68 bpm, LBBB\par 5/18/2022, sinus rhythm, 69 bpm, LBBB [de-identified] : 1/9/2023 in [place] - moderate MR, moderate-severe TR, pulmonary hypertension, septal and lateral wall motion abnormality, LVEF 35%\par 3/3/2023, dilated LA, severe pulmonary hypertension, PASP 64 mmHg, severe global LV dysfunction, LVEF 15-20%

## 2023-07-21 LAB
ALBUMIN SERPL ELPH-MCNC: 4.4 G/DL
ALP BLD-CCNC: 51 U/L
ALT SERPL-CCNC: 63 U/L
ANION GAP SERPL CALC-SCNC: 16 MMOL/L
AST SERPL-CCNC: 41 U/L
BILIRUB SERPL-MCNC: 0.8 MG/DL
BUN SERPL-MCNC: 37 MG/DL
CALCIUM SERPL-MCNC: 9.8 MG/DL
CHLORIDE SERPL-SCNC: 108 MMOL/L
CO2 SERPL-SCNC: 24 MMOL/L
CREAT SERPL-MCNC: 1.78 MG/DL
EGFR: 27 ML/MIN/1.73M2
GLUCOSE SERPL-MCNC: 134 MG/DL
POTASSIUM SERPL-SCNC: 3.6 MMOL/L
PROT SERPL-MCNC: 7.6 G/DL
SODIUM SERPL-SCNC: 148 MMOL/L

## 2023-07-28 ENCOUNTER — TRANSCRIPTION ENCOUNTER (OUTPATIENT)
Age: 88
End: 2023-07-28

## 2023-07-31 ENCOUNTER — APPOINTMENT (OUTPATIENT)
Dept: GERIATRICS | Facility: CLINIC | Age: 88
End: 2023-07-31
Payer: MEDICARE

## 2023-07-31 VITALS
TEMPERATURE: 97.5 F | DIASTOLIC BLOOD PRESSURE: 72 MMHG | BODY MASS INDEX: 20.57 KG/M2 | HEIGHT: 64 IN | OXYGEN SATURATION: 99 % | WEIGHT: 120.5 LBS | RESPIRATION RATE: 15 BRPM | SYSTOLIC BLOOD PRESSURE: 121 MMHG | HEART RATE: 62 BPM

## 2023-07-31 DIAGNOSIS — Z00.00 ENCOUNTER FOR GENERAL ADULT MEDICAL EXAMINATION W/OUT ABNORMAL FINDINGS: ICD-10-CM

## 2023-07-31 PROCEDURE — G0439: CPT

## 2023-07-31 NOTE — HISTORY OF PRESENT ILLNESS
[Patient reported hearing was abnormal] : Patient reported hearing was abnormal [Patient declined colon rectal/cancer screening] : Patient declined colon/rectal cancer screening [Patient declined breast sonogram] : Patient declined breast sonogram [Patient declined cervical cancer screening] : Patient declined cervical cancer screening [One fall no injury in past year] : Patient reported one fall in the past year without injury [Independent] : transferring/mobility [Completely Dependent] : Completely dependent. [Full assistance needed] : Assistance needed managing medications [FAST Score: ____] : Functional Assessment Scale (FAST) Score: [unfilled] [Smoke Detector] : smoke detector [Carbon Monoxide Detector] : carbon monoxide detector [Grab Bars] : grab bars [Night Light] : night light [Wears Seat Belt] : wears seat belt [Other reason not done] : Other reason not done [Mild] : Stage: Mild [Stable] : Status: Stable [Memory Lapses Or Loss] : stable memory impairment [Patient Observed To Be Agitated] : denies agitation [Hostility Toward Caregivers] : denies aggression [Sleep Disturbances] : denies sleep disturbances [] : denies wandering [Fixed Beliefs Contradicted By Reality (Delusions)] : denies delusions [Difficulty Finding Desired Words] : denies difficulty finding desired words [Designated Healthcare Proxy] : Designated healthcare proxy [DNR] : DNR [DNI] : DNI [PMH Reviewed and Updated] : past medical history reviewed and updated [PSH Reviewed and Updated] : past surgical history reviewed and updated [Family History Reviewed and Updated] : family history reviewed and updated [Medication and Allergies Reconciled] : medication and allergies reconciled [0] : 0 [Over the Past 2 Weeks, Have You Felt Down, Depressed, or Hopeless?] : 1.) Over the past 2 weeks, have you felt down, depressed, or hopeless? Yes [Over the Past 2 Weeks, Have You Felt Little Interest or Pleasure Doing Things?] : 2.) Over the past 2 weeks, have you felt little interest or pleasure doing things? Yes [FreeTextEntry1] : \par  TODAY patient reports feels well at this time and denies having any complaints however poor historian d/t baseline memory loss. \par  \par  Justa reports pt c/o chest heaviness and feeling tired this morning. \par  \par  Wt 127 yesterday morning.  Took extra dose of Lasix yesterday and wt decreased to 123 lbs this morning. \par  \par  Back on Mirtazapine and recently  increased for depressed mood.\par  \par  BPS: mood is down \par  \par  Appetite: decreased - c/o chest heaviness and unclear if exacerbated by eating?\par  \par  - Motor Syx: chronic back pain, sometimes off balance and "shaky" when walking.  \par  Ambulates unassisted. \par  Last fall in 2/21 - when getting up to go to the bathroom in the middle of the night. No injury. \par  \par  - Sleep:  ok\par  \par  DEMENTIA / DEPRESSION / ANXIETY \par  - 11/21: Dx: Forgetfullness started approx 2016.  Sometimes forgets family member names.  Forgets her age, dates, forgets where placed glasses few minutes ago. Slow progression over past several years. Sometimes left alone at home but not for more than 1 hr.  NO longer cooks but used to. \par  \par  - MOCA 11/ 30 w/ NP Gurwinder on 11/1/22 \par  - Previous cognitive testing: MoCA 7/30 on 9/8/21 w/ neuro Dr. Robles \par  - MRI HEAD 10/26/21: b/l scattered small vessel white matter ischemic changes. \par  \par  [x ] Neuro Dr. Melita Robles\par  [ ] Psych\par  \par  HFrEF / HTN / HLD / CAD s/p PCI w/ stent ~2013 \par  - Follows w/ cards Dr. López\par  - on ASA, statin, BB, Farxiga\par  - Previously on Entresto - held d/t low BP durign hos [PapSmeardate] : 6/21 [TextBox_31] : had hearing loss but was told would not benefit from HAs d/t might "make her more confused"  [BoneDensityDate] : 3/22  [TextBox_37] : follows w/ optho Dr. Ca Camacho regularly  [FreeTextEntry7] : had [employer] Covid vaccine x 2, previously had flu vaccien without complications [Guns at Home] : no guns at home [Driving Concerns] : not driving or driving without noted concerns [[facility]] : >12  [de-identified] : PHQ2 of 2 on 11/8/22  [AdvancecareDate] : 7/31/23 [FreeTextEntry4] : HCP form on file: primary HCP is dtr Sarah, alternate is granddaughter Justa.  . 6 children - Moshe Starks Eric, Kenrick, GLoria, Harvinder BUCIO on file - DNR, DNI, send to hospital when necessary, NFT, limited IVF, limited Abx, No dialysis

## 2023-07-31 NOTE — REASON FOR VISIT
[Post Hospitalization] : a post hospitalization visit [Family Member] : family member [FreeTextEntry1] : weight loss, HFrEF, dementia [FreeTextEntry3] : granddaughter Justa  [FreeTextEntry2] : who assists with history d/t pt limited historian d/t baseline memory loss

## 2023-07-31 NOTE — SOCIAL HISTORY
Purse String (Simple) Text: Given the location of the defect and the characteristics of the surrounding skin a purse string simple closure was deemed most appropriate.  Undermining was performed circumferentially around the surgical defect.  A purse string suture was then placed and tightened. [With family] : lives with family [House] : in a house [FreeTextEntry1] : no formal help

## 2023-07-31 NOTE — PHYSICAL EXAM
[Normal Outer Ear/Nose] : the ears and nose were normal in appearance [No Clubbing, Cyanosis] : no clubbing or cyanosis of the fingernails [Involuntary Movements] : no involuntary movements were seen [Motor Tone] : muscle strength and tone were normal [Normal] : normal skin color and pigmentation [No Focal Deficits] : no focal deficits [Normal Affect] : the affect was normal [Normal Mood] : the mood was normal [Normal Hearing] : hearing was not normal [Normal Gait] : abnormal gait [Normal Insight/Judgment] : insight and judgment were not intact [de-identified] : KAYLAH [de-identified] : slow steady cautious gait  [de-identified] : confused [de-identified] : calm, cooperative

## 2023-08-22 NOTE — PATIENT PROFILE ADULT - FALL HARM RISK - RISK INTERVENTIONS
Patient is an established patient    We can see him at 0 today with PVR and make sure he comes prepared to urinate for urine sample    (Within the past 2 years patient has canceled appointment 6 times) Assistance OOB with selected safe patient handling equipment/Assistance with ambulation/Communicate Fall Risk and Risk Factors to all staff, patient, and family/Reinforce activity limits and safety measures with patient and family/Visual Cue: Yellow wristband/Bed in lowest position, wheels locked, appropriate side rails in place/Call bell, personal items and telephone in reach/Instruct patient to call for assistance before getting out of bed or chair/Non-slip footwear when patient is out of bed/[place] to call system/Physically safe environment - no spills, clutter or unnecessary equipment/Purposeful Proactive Rounding/Room/bathroom lighting operational, light cord in reach

## 2023-08-28 ENCOUNTER — APPOINTMENT (OUTPATIENT)
Dept: GERIATRICS | Facility: CLINIC | Age: 88
End: 2023-08-28

## 2023-09-06 ENCOUNTER — APPOINTMENT (OUTPATIENT)
Dept: NEUROLOGY | Facility: CLINIC | Age: 88
End: 2023-09-06
Payer: MEDICARE

## 2023-09-06 VITALS
DIASTOLIC BLOOD PRESSURE: 64 MMHG | WEIGHT: 121 LBS | HEART RATE: 73 BPM | TEMPERATURE: 98 F | BODY MASS INDEX: 20.77 KG/M2 | OXYGEN SATURATION: 98 % | SYSTOLIC BLOOD PRESSURE: 124 MMHG

## 2023-09-06 DIAGNOSIS — F02.80 OTHER FRONTOTEMPORAL DEMENTIA: ICD-10-CM

## 2023-09-06 DIAGNOSIS — G31.09 OTHER FRONTOTEMPORAL DEMENTIA: ICD-10-CM

## 2023-09-06 PROCEDURE — 99214 OFFICE O/P EST MOD 30 MIN: CPT

## 2023-09-06 NOTE — DATA REVIEWED
[de-identified] : Brain FDG PET scan 5/2023- c/w LATE +/- FTD.   MRI brain 10/26/2021- mild-mod small vessel ischemic changes

## 2023-09-06 NOTE — HISTORY OF PRESENT ILLNESS
[FreeTextEntry1] : INTERIM HX 2023: Brain FDG PET scan 2023- c/w LATE +/- FT, reviewed results with granddaughter. Here for follow-up with granddaughter Ct. Sleeping 12h per night. No recent changes in mood. She was depressed few months ago (eating less, not interested in fun activities), but this resolved after mirtazapine was increased to 7.5mg in 2023. No hallucinations. No focal numbness/weakness, vision changes, slurred speech. Able to get dressed, feed self, and use bathroom unassisted. No longer cooking. STM and LTM are both poor. Memory from >20 years ago is still intact. No recent falls. Hospitalized in 2023 due to HF. Patient is oriented to state ("[place]"), not location, month, year, or age. Oriented to  and granddaughter's name.  INTERIM HX 2023: last seen 2021. Been followed by [facility] program for dementia. Accompanied by Niece and granddaughter. granddtr reports she has been "on and off"- cognitive and physical. Some days she can remember more. Needs to reminded daily of the date and day. Knows she is in [place]. Recalls immediate family members by face and name. Never got hearing aids. No behavioral symptoms. Sleeping okay. She has severe idiopathic cardiomyopathy (new diagnosis), experiencing intermittent chest pains and SOB, and seeing cardiologist, may need pacemaker., recently started 2 new meds for heart. she also has sinus tach and RRB. she lives with daughter and her family. She does not drive. She can dress and feed herself, bathe herself. family manage meds, made a mistake. --------------------------------------------------- HPI (initial visit Sep 08, 2021)- Gloria is a 86 year old RH female with hx of HTN, HLD, CAD s/p stent,  referred for hx of memory loss. She is accompanied by granddaughter, Fela.   Hx obtained from pt:- "I am confused". "I don't know why I am here". She is aware she is in a doctors clinic. She tells me she lives in a house in [place] with family- granddaughter, daughter and her . She could not recall what her occupation was in the past or when she retired. 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 When asked how many children she has, she counts with her fingers and says "6"-2 girls and 4 boys. 2 boys in [place] and 2 in the [facility] ([place]). "Memory is not to good, I forget". "I used to cook". Family cooks for her. She does not drive. Does not know why we are wearing masks and does not know current president.  Hx obtained from granddaughter-Memory issues x 3-4 years. She had a cognitive evaluation through insurance and did poorly. She forgets conversations in 5 minutes. She cannot stay alone at home, she gets confused. She corrects pt and says they lives in [place] and not [place]. She experiences sundowning at night and gets confused if she lives house at night. She remembers names of family members she frequently meets. She stays in [place] in calles and summers she is in [place], travels back and forth. Someone is almost always with her at home. She is able to feed and dress herself. She can fold clothes, but not do laundry. She takes her own medications. She has daily routine. no hallucinations. no paranoia. no personality changes. She gets tearful easily for no particular reason.   She had a sister with "dementia".

## 2023-09-06 NOTE — ASSESSMENT
[FreeTextEntry1] : Assessment/Plan:  88 year old female with hx of progressive cognitive decline since 2017/2018 which interferes with daily activities, consistent with diagnosis of dementia.  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  Moderate stage  Plan:- - No indication for AD drugs  - Continue to follow up with ADC program  Counselled on the diagnosis of neurodegenerative dementia and reviewed its natural history. As the disease progresses, there is increasing impairment in cognitive functioning, including memory, language, and executive functioning. Behavioral changes also progress as the disease advances, as does one's ability to function independently; eventually requiring full time assistance with daily needs. Counselled on safety and future planning. There is no curative treatment or drugs that can slow the overall progression of disease. Discussed strategies for maintaining cognitive health (encouraged healthy eating habits, routine physical exercise, social interaction and cognitive stimulations (reading, word puzzles)). Counselled on management of vascular risk factors.   Return to clinic 1 year, or sooner if needed  Melita Robles M.D .

## 2023-09-06 NOTE — PHYSICAL EXAM
[FreeTextEntry1] : 2021 MOCA 7/30  3/24/2023 MMSE   Alert, in no distress, calm no facial asymmetry no dysarthria Patient is oriented to state ("[place]"), not location, month, year, or age. Oriented to  and granddaughter's name. follows all one step commands Hearing impaired No tremors No drift in UE Moving all extremities symmetrically Gait is mildly unsteady (mild shaking of legs), mild decreased arm swing on the left, normal turning

## 2023-10-02 ENCOUNTER — APPOINTMENT (OUTPATIENT)
Dept: GERIATRICS | Facility: CLINIC | Age: 88
End: 2023-10-02
Payer: MEDICARE

## 2023-10-02 VITALS
HEIGHT: 64 IN | DIASTOLIC BLOOD PRESSURE: 68 MMHG | OXYGEN SATURATION: 99 % | WEIGHT: 124.38 LBS | SYSTOLIC BLOOD PRESSURE: 121 MMHG | BODY MASS INDEX: 21.24 KG/M2 | TEMPERATURE: 97.1 F | HEART RATE: 66 BPM | RESPIRATION RATE: 16 BRPM

## 2023-10-02 DIAGNOSIS — Z23 ENCOUNTER FOR IMMUNIZATION: ICD-10-CM

## 2023-10-02 DIAGNOSIS — Z63.6 DEPENDENT RELATIVE NEEDING CARE AT HOME: ICD-10-CM

## 2023-10-02 PROCEDURE — 90662 IIV NO PRSV INCREASED AG IM: CPT

## 2023-10-02 PROCEDURE — 99215 OFFICE O/P EST HI 40 MIN: CPT | Mod: 25

## 2023-10-02 PROCEDURE — G0008: CPT

## 2023-10-02 SDOH — SOCIAL STABILITY - SOCIAL INSECURITY: DEPENDENT RELATIVE NEEDING CARE AT HOME: Z63.6

## 2023-10-18 ENCOUNTER — APPOINTMENT (OUTPATIENT)
Dept: CARDIOLOGY | Facility: CLINIC | Age: 88
End: 2023-10-18
Payer: MEDICARE

## 2023-10-18 ENCOUNTER — NON-APPOINTMENT (OUTPATIENT)
Age: 88
End: 2023-10-18

## 2023-10-18 VITALS
HEART RATE: 75 BPM | BODY MASS INDEX: 21.46 KG/M2 | OXYGEN SATURATION: 99 % | WEIGHT: 125 LBS | DIASTOLIC BLOOD PRESSURE: 66 MMHG | SYSTOLIC BLOOD PRESSURE: 147 MMHG

## 2023-10-18 PROCEDURE — 99214 OFFICE O/P EST MOD 30 MIN: CPT

## 2023-10-18 PROCEDURE — 93000 ELECTROCARDIOGRAM COMPLETE: CPT

## 2023-10-25 ENCOUNTER — APPOINTMENT (OUTPATIENT)
Dept: GERIATRICS | Facility: CLINIC | Age: 88
End: 2023-10-25
Payer: MEDICARE

## 2023-10-25 VITALS
DIASTOLIC BLOOD PRESSURE: 75 MMHG | BODY MASS INDEX: 21.11 KG/M2 | SYSTOLIC BLOOD PRESSURE: 130 MMHG | RESPIRATION RATE: 15 BRPM | HEART RATE: 79 BPM | OXYGEN SATURATION: 98 % | TEMPERATURE: 98.2 F | WEIGHT: 123 LBS

## 2023-10-25 DIAGNOSIS — R26.81 UNSTEADINESS ON FEET: ICD-10-CM

## 2023-10-25 PROCEDURE — 99214 OFFICE O/P EST MOD 30 MIN: CPT

## 2023-11-30 NOTE — REASON FOR VISIT
Redirect as needed  monitor   [Follow-Up] : a follow-up visit [FreeTextEntry1] : dementia [FreeTextEntry3] : dtnatalie Alfonso and granddaughter Justa  [FreeTextEntry2] : who assist with history d/t pt limited historian d/t baseline memory loss

## 2023-12-11 ENCOUNTER — INPATIENT (INPATIENT)
Facility: HOSPITAL | Age: 88
LOS: 0 days | Discharge: HOME | End: 2023-12-12
Attending: STUDENT IN AN ORGANIZED HEALTH CARE EDUCATION/TRAINING PROGRAM | Admitting: STUDENT IN AN ORGANIZED HEALTH CARE EDUCATION/TRAINING PROGRAM
Payer: MEDICARE

## 2023-12-11 VITALS
OXYGEN SATURATION: 95 % | RESPIRATION RATE: 24 BRPM | DIASTOLIC BLOOD PRESSURE: 66 MMHG | TEMPERATURE: 98 F | SYSTOLIC BLOOD PRESSURE: 99 MMHG | HEART RATE: 97 BPM

## 2023-12-11 DIAGNOSIS — I21.4 NON-ST ELEVATION (NSTEMI) MYOCARDIAL INFARCTION: ICD-10-CM

## 2023-12-11 DIAGNOSIS — F03.90 UNSPECIFIED DEMENTIA WITHOUT BEHAVIORAL DISTURBANCE: ICD-10-CM

## 2023-12-11 DIAGNOSIS — R07.9 CHEST PAIN, UNSPECIFIED: ICD-10-CM

## 2023-12-11 DIAGNOSIS — I10 ESSENTIAL (PRIMARY) HYPERTENSION: ICD-10-CM

## 2023-12-11 DIAGNOSIS — E87.4 MIXED DISORDER OF ACID-BASE BALANCE: ICD-10-CM

## 2023-12-11 DIAGNOSIS — R74.8 ABNORMAL LEVELS OF OTHER SERUM ENZYMES: ICD-10-CM

## 2023-12-11 DIAGNOSIS — Z29.9 ENCOUNTER FOR PROPHYLACTIC MEASURES, UNSPECIFIED: ICD-10-CM

## 2023-12-11 DIAGNOSIS — E78.5 HYPERLIPIDEMIA, UNSPECIFIED: ICD-10-CM

## 2023-12-11 DIAGNOSIS — N18.9 CHRONIC KIDNEY DISEASE, UNSPECIFIED: ICD-10-CM

## 2023-12-11 DIAGNOSIS — I50.22 CHRONIC SYSTOLIC (CONGESTIVE) HEART FAILURE: ICD-10-CM

## 2023-12-11 LAB
ALBUMIN SERPL ELPH-MCNC: 4.8 G/DL — SIGNIFICANT CHANGE UP (ref 3.3–5)
ALBUMIN SERPL ELPH-MCNC: 4.8 G/DL — SIGNIFICANT CHANGE UP (ref 3.3–5)
ALP SERPL-CCNC: 73 U/L — SIGNIFICANT CHANGE UP (ref 40–120)
ALP SERPL-CCNC: 73 U/L — SIGNIFICANT CHANGE UP (ref 40–120)
ALT FLD-CCNC: 51 U/L — HIGH (ref 4–33)
ALT FLD-CCNC: 51 U/L — HIGH (ref 4–33)
ANION GAP SERPL CALC-SCNC: 17 MMOL/L — HIGH (ref 7–14)
ANION GAP SERPL CALC-SCNC: 17 MMOL/L — HIGH (ref 7–14)
APTT BLD: 32.4 SEC — SIGNIFICANT CHANGE UP (ref 24.5–35.6)
APTT BLD: 32.4 SEC — SIGNIFICANT CHANGE UP (ref 24.5–35.6)
AST SERPL-CCNC: 56 U/L — HIGH (ref 4–32)
AST SERPL-CCNC: 56 U/L — HIGH (ref 4–32)
BASOPHILS # BLD AUTO: 0.03 K/UL — SIGNIFICANT CHANGE UP (ref 0–0.2)
BASOPHILS # BLD AUTO: 0.03 K/UL — SIGNIFICANT CHANGE UP (ref 0–0.2)
BASOPHILS NFR BLD AUTO: 0.5 % — SIGNIFICANT CHANGE UP (ref 0–2)
BASOPHILS NFR BLD AUTO: 0.5 % — SIGNIFICANT CHANGE UP (ref 0–2)
BILIRUB SERPL-MCNC: 0.7 MG/DL — SIGNIFICANT CHANGE UP (ref 0.2–1.2)
BILIRUB SERPL-MCNC: 0.7 MG/DL — SIGNIFICANT CHANGE UP (ref 0.2–1.2)
BLOOD GAS VENOUS COMPREHENSIVE RESULT: SIGNIFICANT CHANGE UP
BLOOD GAS VENOUS COMPREHENSIVE RESULT: SIGNIFICANT CHANGE UP
BUN SERPL-MCNC: 28 MG/DL — HIGH (ref 7–23)
BUN SERPL-MCNC: 28 MG/DL — HIGH (ref 7–23)
CALCIUM SERPL-MCNC: 10.2 MG/DL — SIGNIFICANT CHANGE UP (ref 8.4–10.5)
CALCIUM SERPL-MCNC: 10.2 MG/DL — SIGNIFICANT CHANGE UP (ref 8.4–10.5)
CHLORIDE SERPL-SCNC: 102 MMOL/L — SIGNIFICANT CHANGE UP (ref 98–107)
CHLORIDE SERPL-SCNC: 102 MMOL/L — SIGNIFICANT CHANGE UP (ref 98–107)
CO2 SERPL-SCNC: 24 MMOL/L — SIGNIFICANT CHANGE UP (ref 22–31)
CO2 SERPL-SCNC: 24 MMOL/L — SIGNIFICANT CHANGE UP (ref 22–31)
CREAT SERPL-MCNC: 1.61 MG/DL — HIGH (ref 0.5–1.3)
CREAT SERPL-MCNC: 1.61 MG/DL — HIGH (ref 0.5–1.3)
EGFR: 31 ML/MIN/1.73M2 — LOW
EGFR: 31 ML/MIN/1.73M2 — LOW
EOSINOPHIL # BLD AUTO: 0.02 K/UL — SIGNIFICANT CHANGE UP (ref 0–0.5)
EOSINOPHIL # BLD AUTO: 0.02 K/UL — SIGNIFICANT CHANGE UP (ref 0–0.5)
EOSINOPHIL NFR BLD AUTO: 0.4 % — SIGNIFICANT CHANGE UP (ref 0–6)
EOSINOPHIL NFR BLD AUTO: 0.4 % — SIGNIFICANT CHANGE UP (ref 0–6)
GLUCOSE SERPL-MCNC: 148 MG/DL — HIGH (ref 70–99)
GLUCOSE SERPL-MCNC: 148 MG/DL — HIGH (ref 70–99)
HCT VFR BLD CALC: 45.6 % — HIGH (ref 34.5–45)
HCT VFR BLD CALC: 45.6 % — HIGH (ref 34.5–45)
HGB BLD-MCNC: 14.2 G/DL — SIGNIFICANT CHANGE UP (ref 11.5–15.5)
HGB BLD-MCNC: 14.2 G/DL — SIGNIFICANT CHANGE UP (ref 11.5–15.5)
IANC: 4.27 K/UL — SIGNIFICANT CHANGE UP (ref 1.8–7.4)
IANC: 4.27 K/UL — SIGNIFICANT CHANGE UP (ref 1.8–7.4)
IMM GRANULOCYTES NFR BLD AUTO: 0.4 % — SIGNIFICANT CHANGE UP (ref 0–0.9)
IMM GRANULOCYTES NFR BLD AUTO: 0.4 % — SIGNIFICANT CHANGE UP (ref 0–0.9)
INR BLD: 0.97 RATIO — SIGNIFICANT CHANGE UP (ref 0.85–1.18)
INR BLD: 0.97 RATIO — SIGNIFICANT CHANGE UP (ref 0.85–1.18)
LYMPHOCYTES # BLD AUTO: 0.93 K/UL — LOW (ref 1–3.3)
LYMPHOCYTES # BLD AUTO: 0.93 K/UL — LOW (ref 1–3.3)
LYMPHOCYTES # BLD AUTO: 16.8 % — SIGNIFICANT CHANGE UP (ref 13–44)
LYMPHOCYTES # BLD AUTO: 16.8 % — SIGNIFICANT CHANGE UP (ref 13–44)
MCHC RBC-ENTMCNC: 26.5 PG — LOW (ref 27–34)
MCHC RBC-ENTMCNC: 26.5 PG — LOW (ref 27–34)
MCHC RBC-ENTMCNC: 31.1 GM/DL — LOW (ref 32–36)
MCHC RBC-ENTMCNC: 31.1 GM/DL — LOW (ref 32–36)
MCV RBC AUTO: 85.1 FL — SIGNIFICANT CHANGE UP (ref 80–100)
MCV RBC AUTO: 85.1 FL — SIGNIFICANT CHANGE UP (ref 80–100)
MONOCYTES # BLD AUTO: 0.26 K/UL — SIGNIFICANT CHANGE UP (ref 0–0.9)
MONOCYTES # BLD AUTO: 0.26 K/UL — SIGNIFICANT CHANGE UP (ref 0–0.9)
MONOCYTES NFR BLD AUTO: 4.7 % — SIGNIFICANT CHANGE UP (ref 2–14)
MONOCYTES NFR BLD AUTO: 4.7 % — SIGNIFICANT CHANGE UP (ref 2–14)
NEUTROPHILS # BLD AUTO: 4.27 K/UL — SIGNIFICANT CHANGE UP (ref 1.8–7.4)
NEUTROPHILS # BLD AUTO: 4.27 K/UL — SIGNIFICANT CHANGE UP (ref 1.8–7.4)
NEUTROPHILS NFR BLD AUTO: 77.2 % — HIGH (ref 43–77)
NEUTROPHILS NFR BLD AUTO: 77.2 % — HIGH (ref 43–77)
NRBC # BLD: 0 /100 WBCS — SIGNIFICANT CHANGE UP (ref 0–0)
NRBC # BLD: 0 /100 WBCS — SIGNIFICANT CHANGE UP (ref 0–0)
NRBC # FLD: 0 K/UL — SIGNIFICANT CHANGE UP (ref 0–0)
NRBC # FLD: 0 K/UL — SIGNIFICANT CHANGE UP (ref 0–0)
NT-PROBNP SERPL-SCNC: 1979 PG/ML — HIGH
NT-PROBNP SERPL-SCNC: 1979 PG/ML — HIGH
PLATELET # BLD AUTO: 183 K/UL — SIGNIFICANT CHANGE UP (ref 150–400)
PLATELET # BLD AUTO: 183 K/UL — SIGNIFICANT CHANGE UP (ref 150–400)
POTASSIUM SERPL-MCNC: 4 MMOL/L — SIGNIFICANT CHANGE UP (ref 3.5–5.3)
POTASSIUM SERPL-MCNC: 4 MMOL/L — SIGNIFICANT CHANGE UP (ref 3.5–5.3)
POTASSIUM SERPL-SCNC: 4 MMOL/L — SIGNIFICANT CHANGE UP (ref 3.5–5.3)
POTASSIUM SERPL-SCNC: 4 MMOL/L — SIGNIFICANT CHANGE UP (ref 3.5–5.3)
PROT SERPL-MCNC: 8.5 G/DL — HIGH (ref 6–8.3)
PROT SERPL-MCNC: 8.5 G/DL — HIGH (ref 6–8.3)
PROTHROM AB SERPL-ACNC: 11 SEC — SIGNIFICANT CHANGE UP (ref 9.5–13)
PROTHROM AB SERPL-ACNC: 11 SEC — SIGNIFICANT CHANGE UP (ref 9.5–13)
RBC # BLD: 5.36 M/UL — HIGH (ref 3.8–5.2)
RBC # BLD: 5.36 M/UL — HIGH (ref 3.8–5.2)
RBC # FLD: 12.7 % — SIGNIFICANT CHANGE UP (ref 10.3–14.5)
RBC # FLD: 12.7 % — SIGNIFICANT CHANGE UP (ref 10.3–14.5)
SODIUM SERPL-SCNC: 143 MMOL/L — SIGNIFICANT CHANGE UP (ref 135–145)
SODIUM SERPL-SCNC: 143 MMOL/L — SIGNIFICANT CHANGE UP (ref 135–145)
TROPONIN T, HIGH SENSITIVITY RESULT: 421 NG/L — CRITICAL HIGH
TROPONIN T, HIGH SENSITIVITY RESULT: 421 NG/L — CRITICAL HIGH
TROPONIN T, HIGH SENSITIVITY RESULT: 540 NG/L — CRITICAL HIGH
TROPONIN T, HIGH SENSITIVITY RESULT: 540 NG/L — CRITICAL HIGH
TROPONIN T, HIGH SENSITIVITY RESULT: 548 NG/L — CRITICAL HIGH
TROPONIN T, HIGH SENSITIVITY RESULT: 548 NG/L — CRITICAL HIGH
WBC # BLD: 5.53 K/UL — SIGNIFICANT CHANGE UP (ref 3.8–10.5)
WBC # BLD: 5.53 K/UL — SIGNIFICANT CHANGE UP (ref 3.8–10.5)
WBC # FLD AUTO: 5.53 K/UL — SIGNIFICANT CHANGE UP (ref 3.8–10.5)
WBC # FLD AUTO: 5.53 K/UL — SIGNIFICANT CHANGE UP (ref 3.8–10.5)

## 2023-12-11 PROCEDURE — 99285 EMERGENCY DEPT VISIT HI MDM: CPT

## 2023-12-11 PROCEDURE — 71045 X-RAY EXAM CHEST 1 VIEW: CPT | Mod: 26

## 2023-12-11 RX ORDER — ATORVASTATIN CALCIUM 80 MG/1
20 TABLET, FILM COATED ORAL AT BEDTIME
Refills: 0 | Status: DISCONTINUED | OUTPATIENT
Start: 2023-12-11 | End: 2023-12-12

## 2023-12-11 RX ORDER — HEPARIN SODIUM 5000 [USP'U]/ML
3500 INJECTION INTRAVENOUS; SUBCUTANEOUS EVERY 6 HOURS
Refills: 0 | Status: DISCONTINUED | OUTPATIENT
Start: 2023-12-11 | End: 2023-12-12

## 2023-12-11 RX ORDER — CLOPIDOGREL BISULFATE 75 MG/1
300 TABLET, FILM COATED ORAL ONCE
Refills: 0 | Status: COMPLETED | OUTPATIENT
Start: 2023-12-11 | End: 2023-12-11

## 2023-12-11 RX ORDER — CLOPIDOGREL BISULFATE 75 MG/1
75 TABLET, FILM COATED ORAL DAILY
Refills: 0 | Status: DISCONTINUED | OUTPATIENT
Start: 2023-12-12 | End: 2023-12-12

## 2023-12-11 RX ORDER — HEPARIN SODIUM 5000 [USP'U]/ML
INJECTION INTRAVENOUS; SUBCUTANEOUS
Qty: 25000 | Refills: 0 | Status: DISCONTINUED | OUTPATIENT
Start: 2023-12-11 | End: 2023-12-12

## 2023-12-11 RX ORDER — ASPIRIN/CALCIUM CARB/MAGNESIUM 324 MG
162 TABLET ORAL ONCE
Refills: 0 | Status: COMPLETED | OUTPATIENT
Start: 2023-12-11 | End: 2023-12-11

## 2023-12-11 RX ORDER — FUROSEMIDE 40 MG
40 TABLET ORAL DAILY
Refills: 0 | Status: DISCONTINUED | OUTPATIENT
Start: 2023-12-11 | End: 2023-12-12

## 2023-12-11 RX ORDER — HEPARIN SODIUM 5000 [USP'U]/ML
3500 INJECTION INTRAVENOUS; SUBCUTANEOUS ONCE
Refills: 0 | Status: COMPLETED | OUTPATIENT
Start: 2023-12-11 | End: 2023-12-11

## 2023-12-11 RX ORDER — ASPIRIN/CALCIUM CARB/MAGNESIUM 324 MG
81 TABLET ORAL DAILY
Refills: 0 | Status: DISCONTINUED | OUTPATIENT
Start: 2023-12-12 | End: 2023-12-12

## 2023-12-11 RX ORDER — SERTRALINE 25 MG/1
1 TABLET, FILM COATED ORAL
Refills: 0 | DISCHARGE

## 2023-12-11 RX ORDER — METOPROLOL TARTRATE 50 MG
25 TABLET ORAL DAILY
Refills: 0 | Status: DISCONTINUED | OUTPATIENT
Start: 2023-12-11 | End: 2023-12-12

## 2023-12-11 RX ORDER — MIRTAZAPINE 45 MG/1
7.5 TABLET, ORALLY DISINTEGRATING ORAL DAILY
Refills: 0 | Status: DISCONTINUED | OUTPATIENT
Start: 2023-12-11 | End: 2023-12-12

## 2023-12-11 RX ORDER — ACETAMINOPHEN 500 MG
650 TABLET ORAL EVERY 6 HOURS
Refills: 0 | Status: DISCONTINUED | OUTPATIENT
Start: 2023-12-11 | End: 2023-12-12

## 2023-12-11 RX ADMIN — HEPARIN SODIUM 3500 UNIT(S): 5000 INJECTION INTRAVENOUS; SUBCUTANEOUS at 23:00

## 2023-12-11 RX ADMIN — ATORVASTATIN CALCIUM 20 MILLIGRAM(S): 80 TABLET, FILM COATED ORAL at 23:01

## 2023-12-11 RX ADMIN — Medication 162 MILLIGRAM(S): at 15:44

## 2023-12-11 RX ADMIN — Medication 40 MILLIGRAM(S): at 23:01

## 2023-12-11 RX ADMIN — MIRTAZAPINE 7.5 MILLIGRAM(S): 45 TABLET, ORALLY DISINTEGRATING ORAL at 23:02

## 2023-12-11 RX ADMIN — HEPARIN SODIUM 700 UNIT(S)/HR: 5000 INJECTION INTRAVENOUS; SUBCUTANEOUS at 23:06

## 2023-12-11 RX ADMIN — Medication 25 MILLIGRAM(S): at 23:01

## 2023-12-11 RX ADMIN — CLOPIDOGREL BISULFATE 300 MILLIGRAM(S): 75 TABLET, FILM COATED ORAL at 20:31

## 2023-12-11 NOTE — H&P ADULT - PROBLEM SELECTOR PLAN 7
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

## 2023-12-11 NOTE — H&P ADULT - PROBLEM SELECTOR PLAN 9
DVT PPx: Hep gtt  Diet: DASH  Code: DNR/DNI, MOLST in ED chart  Dispo: PT ordered, f/u recs  Communication: granddaughter Justa Hebert ([phone])    Discharge information:  Pharmacy -   PCP - Rosie Lawrence DVT PPx: Hep gtt  Diet: DASH  Code: DNR/DNI, MOLST in ED chart  Dispo: PT ordered, f/u recs  Communication: granddaughter Justa Hebert ([phone])    Discharge information:  Pharmacy -   PCP - Rosie Lawrence - patient had prior MOLST, which is unavailable  - new MOLST completed  - patient is DNR/DNI, with allowance for trial of IVF and antibiotics.  No hemodialysis or feeding tube

## 2023-12-11 NOTE — ED ADULT NURSE NOTE - NSFALLHARMRISKINTERV_ED_ALL_ED
Assistance OOB with selected safe patient handling equipment if applicable/Assistance with ambulation/Communicate risk of Fall with Harm to all staff, patient, and family/Provide visual cue: red socks, yellow wristband, yellow gown, etc/Reinforce activity limits and safety measures with patient and family/Bed in lowest position, wheels locked, appropriate side rails in place/Call bell, personal items and telephone in reach/Instruct patient to call for assistance before getting out of bed/chair/stretcher/Non-slip footwear applied when patient is off stretcher/[place] to call system/Physically safe environment - no spills, clutter or unnecessary equipment/Purposeful Proactive Rounding/Room/bathroom lighting operational, light cord in reach Assistance OOB with selected safe patient handling equipment if applicable/Assistance with ambulation/Communicate risk of Fall with Harm to all staff, patient, and family/Provide visual cue: red socks, yellow wristband, yellow gown, etc/Reinforce activity limits and safety measures with patient and family/Bed in lowest position, wheels locked, appropriate side rails in place/Call bell, personal items and telephone in reach/Instruct patient to call for assistance before getting out of bed/chair/stretcher/Non-slip footwear applied when patient is off stretcher/[place] to call system/Physically safe environment - no spills, clutter or unnecessary equipment/Purposeful Proactive Rounding/Room/bathroom lighting operational, light cord in reach

## 2023-12-11 NOTE — CONSULT NOTE ADULT - ATTENDING COMMENTS
pt's symptoms (and unfortunately lack of collateral due to cognitive impairment) are concerning for ACS  she appears euvolemic on exam  After a prior goals of care conversation there was a decision to continue medical therapy.  cont DAPT  PPI while on DAPT to reduce risk of bleeding  -cont bblocker, Entresto

## 2023-12-11 NOTE — ED PROVIDER NOTE - PHYSICAL EXAMINATION
GEN - awake, alert, [place], frail, oriented to name, answers some questions  HEAD - NC/AT   EYES- PERRL, EOMI  ENT: Airway patent, mmm, Oral cavity and pharynx normal. No inflammation, swelling, exudate, or lesions.    NECK: Neck supple  PULMONARY - CTA b/l, symmetric breath sounds, unlabored at rest, no accessory muscle use, nl o2 sat on ra, nsr with normal rate  on monitor.  CARDIAC -s1s2, RRR, no M,G,R  ABDOMEN - +BS, ND, NT, soft, no guarding, no rebound, no masses   BACK - no CVA tenderness, Normal  spine   EXTREMITIES - FROM, symmetric pulses, capillary refill < 2 seconds, no edema   SKIN - no rash or bruising   NEUROLOGIC - alert, speech clear, moves all ext equally, silt GEN - awake, alert, [place], frail, oriented to name, answers some questions  HEAD - NC/AT   EYES- PERRL, EOMI  ENT: Airway patent, mmm, Oral cavity and pharynx normal. No inflammation, swelling, exudate, or lesions.    NECK: Neck supple  PULMONARY - CTA b/l, symmetric breath sounds, unlabored at rest, no accessory muscle use, nl o2 sat on ra, nsr with normal rate  on monitor.  CARDIAC -s1s2, RRR, no M,G,R  ABDOMEN - +BS, ND, NT, soft, no guarding, no rebound, no masses   BACK - no CVA tenderness, Normal  spine   EXTREMITIES - FROM, symmetric pulses, capillary refill < 2 seconds, no edema   SKIN - no rash or bruising   NEUROLOGIC - alert, speech clear, moves all ext equally, silt

## 2023-12-11 NOTE — H&P ADULT - PROBLEM SELECTOR PLAN 1
Exam fluid status euvolemic. CXR without pulm edema/congestion. Unlikely CHF exacerbation.  - pro BNP 1979 (no baseline, 15K on 4/2023 admission with CHF exacerbation)  > f/u TTE  > cont home GDMT with entresto 24-26, lasix 40, toprol 25  > hold home dapagliflozin Exam fluid status euvolemic. CXR without pulm edema/congestion. Unlikely CHF exacerbation. Follows with Dr. Anand López.  - pro BNP 1979 (no baseline, 15K on 4/2023 admission with CHF exacerbation)  > daily weight (dry weight reportedly 124-126 lbs)  > f/u TTE  > cont home GDMT with entresto 24-26, lasix 40, toprol 25  > hold home dapagliflozin Exam fluid status euvolemic. CXR without pulm edema/congestion. Unlikely CHF exacerbation. Follows with Dr. Anand López.  - pro BNP 1979 (no baseline, 15K on 4/2023 admission with CHF exacerbation)  > strict I/O; daily weight (dry weight reportedly 124-126 lbs)  > f/u TTE  > cont home GDMT with entresto 24-26, toprol 25  > diuresis with lasix 40 IV qD (home dose 40 PM)  > hold home dapagliflozin  > 1.2L fluid restriction Exam fluid status euvolemic. CXR without pulm edema/congestion, but with complication of severe pulmonary hypertension, likely CHF exacerbation. Follows with Dr. Anand López.  - last TTE with EF = 25 to 30% and severe pulmonary hypertension (04/03/2023)  - pro BNP 1979 (no baseline, 15K on 4/2023 admission with CHF exacerbation)  > strict I/O; daily weight (dry weight reportedly 124-126 lbs), HOB elevation  > f/u TTE (ordered)  > cont home GDMT with Entresto 24-26, Toprol 25  > s/p Lasix 40 IV in the ED; will schedule another dose in the AM (and for daily dosing).  In the context of renal failure, may need increased home dosing (currently 40 mg PO daily)  > evaluate for daily furosemide dosing  > hold home dapagliflozin  > 1.2L fluid restriction  - f/u AM la-work, including TSH level  > Cardiology consult in the AM (Marcial Ching MD); please call

## 2023-12-11 NOTE — H&P ADULT - NSICDXPASTMEDICALHX_GEN_ALL_CORE_FT
PAST MEDICAL HISTORY:  Acute CHF     CAD (coronary artery disease)     Hypertension      PAST MEDICAL HISTORY:  Acute CHF     CAD (coronary artery disease)     Dementia     Hypertension     Severe pulmonary hypertension

## 2023-12-11 NOTE — H&P ADULT - PROBLEM SELECTOR PLAN 4
> cont home atorvastatin 20 > cont home entresto 24-26, furosemide 40, toprol 25  > hold home dapagliflozin Likely at baseline, unlikely cardio-renal given euvolemic.  - SCr 1.61, at previous hospitalization baseline 1.5-1.6  > CTM SCr, judicious fluids if needed  > avoid nephrotoxic agents, NSAIDs Likely at baseline, unlikely cardio-renal given euvolemic.  - SCr 1.61, at previous hospitalization baseline 1.5-1.6 (oldest SCr 1.52 10/2021)  > CTM SCr, judicious fluids if needed  > avoid nephrotoxic agents, NSAIDs Likely at baseline, unlikely cardio-renal given euvolemic.  - SCr 1.61, at previous hospitalization baseline 1.5-1.6 (oldest SCr 1.52 10/2021)  > diuresis as above  > consider renal sono if SCr worsens  > CTM SCr, judicious fluids if needed  > avoid nephrotoxic agents, NSAIDs Appears to be at baseline, unlikely cardio-renal  - SCr 1.61, at previous hospitalization baseline 1.5-1.6 (oldest SCr 1.52 10/2021)  > diuresis as above  > would get renal sono if SCr worsens  > CTM SCr, judicious fluids if needed  > avoid nephrotoxic agents, NSAIDs

## 2023-12-11 NOTE — ED ADULT TRIAGE NOTE - CHIEF COMPLAINT QUOTE
Pt c/o SOB, CP while waking up the stairs lasting over 40 minutes. Pt tachypneic on scene, SpO2 85% on RA. Pt received O2 on scene. PHx CHF, CAD with stent, HTN

## 2023-12-11 NOTE — ED ADULT NURSE NOTE - OBJECTIVE STATEMENT
Pt received to room 26. Pt is A&Ox2 to self and place, ambulatory, Hx of CHF, HTN, HLD, dementia. Pt presents to ED via EMS for SOB and chest pain today. Pt's granddaughter at bedside providing history as Pt is a poor historian. Granddaughter reports that at home patient stated she felt chest pain and difficulty breathing, and when patient walked up flight of stairs she appeared out of breath. When asked if she is in any pain or having SOB she replied " I don't know" and Pt began to cry. Pt appears comfortable, no acute distress noted. breathing even and nonlabored. NSR on cardiac monitor. Awaiting further orders. stretcher in lowest position, call bell within reach, safety maintained.

## 2023-12-11 NOTE — H&P ADULT - PROBLEM SELECTOR PLAN 5
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 Unlikely hepatocellular injury given AST and ALT both elevated.  > trend AST/ALT to resolution

## 2023-12-11 NOTE — H&P ADULT - NSHPLABSRESULTS_GEN_ALL_CORE
LABS:                        14.2   5.53  )-----------( 183      ( 11 Dec 2023 12:38 )             45.6     12-11    143  |  102  |  28<H>  ----------------------------<  148<H>  4.0   |  24  |  1.61<H>    Ca    10.2      11 Dec 2023 12:38    TPro  8.5<H>  /  Alb  4.8  /  TBili  0.7  /  DBili  x   /  AST  56<H>  /  ALT  51<H>  /  AlkPhos  73  12-11    PT/INR - ( 11 Dec 2023 20:35 )   PT: 11.0 sec;   INR: 0.97 ratio         PTT - ( 11 Dec 2023 20:35 )  PTT:32.4 sec  CARDIAC MARKERS ( 11 Dec 2023 18:33 )  x     / x     / 210 U/L / x     / 8.2 ng/mL      Urinalysis Basic - ( 11 Dec 2023 12:38 )    Color: x / Appearance: x / SG: x / pH: x  Gluc: 148 mg/dL / Ketone: x  / Bili: x / Urobili: x   Blood: x / Protein: x / Nitrite: x   Leuk Esterase: x / RBC: x / WBC x   Sq Epi: x / Non Sq Epi: x / Bacteria: x          IMAGING & OTHER TESTS:    < from: Xray Chest 1 View- PORTABLE-Urgent (12.11.23 @ 17:01) >    FINDINGS:    The heart is not accurately assessed in this AP projection.  No focal consolidation. No pneumothorax  Chronic left apical opacity, likely scarring.  No pleural effusion No acute bony abnormality.    IMPRESSION:  No focal consolidation.    < end of copied text >    < from: Transthoracic Echocardiogram (04.03.23 @ 14:55) >    CONCLUSIONS:  1. Calcified trileaflet aortic valve with normal opening.  Mild aortic regurgitation.  2. Severe left ventricular enlargement.  3. Endocardial visualization enhanced with intravenous  injection of echo contrast (Definity). Severe global left  ventricular systolic dysfunction.  4. Right ventricular enlargement with decreased right  ventricular systolic function.  5. Normal tricuspid valve.  Moderate tricuspid  regurgitation.  6. Estimated pulmonary artery systolic pressure equals 62  mm Hg, assuming right atrial pressure equals 10  mm Hg,  consistent with severe pulmonary hypertension.  7. Bilateral pleural effusions.    < end of copied text >

## 2023-12-11 NOTE — H&P ADULT - PROBLEM SELECTOR PLAN 2
- family prefers non-invasive medical management, cardiology deferring diagnostic LHC  - Troponins peaked 540  > cont triple therapy ASA/plavix/hep gtt for medical management  > cont home atorvastatin 20 - family prefers non-invasive medical management, cardiology deferring diagnostic LHC  - Troponins peaked 540  - ECG with known prior LBBB and ST elevations not meeting Sgarbossa criteria for STEMI  > cont triple therapy ASA/plavix/hep gtt for medical management per [facility] protocol; d/c hep gtt at 48 hours  > cont home atorvastatin 20 - family prefers non-invasive medical management, cardiology deferring diagnostic LHC  - Troponins peaked 540  - ECG with known prior LBBB and 2-3 mm ST elevations V1-V2 (also on 4/2023 ECG) not meeting Sgarbossa criteria for STEMI  > cont triple therapy ASA/plavix/hep gtt for medical management per [facility] protocol; d/c hep gtt at 48 hours  > cont home atorvastatin 20 - family prefers non-invasive medical management, cardiology deferring diagnostic LHC  - Troponins peaked 540  - ECG with known prior LBBB and 2-3 mm ST elevations V1-V2 (also on 4/2023 ECG) not meeting Sgarbossa criteria for STEMI  > cont triple therapy ASA/Plavix/hep gtt for medical management per [facility] protocol; d/c hep gtt at 48 hours  > cont home atorvastatin 20

## 2023-12-11 NOTE — H&P ADULT - NSHPPHYSICALEXAM_GEN_ALL_CORE
T(C): 36.6 (12-11-23 @ 15:45), Max: 36.6 (12-11-23 @ 15:45)  HR: 89 (12-11-23 @ 15:45) (89 - 98)  BP: 132/82 (12-11-23 @ 15:45) (99/66 - 132/82)  RR: 18 (12-11-23 @ 15:45) (18 - 24)  SpO2: 99% (12-11-23 @ 15:45) (95% - 99%)    GENERAL: well-appearing, NAD, appears comfortable  HEENT: NC/AT, EOMI, no conjunctival icterus, moist mucus membranes  NECK: supple, non-tender, no JVD, no LAD  LUNGS: non-labored breathing, CTAB, no wheezing/rales/rhonchi  CV: RRR, no m/r/g, 2+ palpable peripheral pulses bi/l, cap refill <2 secs  ABD: non-distended, soft, non-tender, no rebound tenderness or guarding  : no CVA tenderness  MSK: full ROM, strength 5/5 bi/l  EXT: warm and well-perfused, no peripheral edema  SKIN: no rashes or lesions  NEURO: AAOx3, no focal deficits T(C): 36.6 (12-11-23 @ 15:45), Max: 36.6 (12-11-23 @ 15:45)  HR: 89 (12-11-23 @ 15:45) (89 - 98)  BP: 132/82 (12-11-23 @ 15:45) (99/66 - 132/82)  RR: 18 (12-11-23 @ 15:45) (18 - 24)  SpO2: 99% (12-11-23 @ 15:45) (95% - 99%)    GENERAL: well-appearing, NAD, appears comfortable  HEENT: NC/AT, EOMI, no conjunctival icterus, moist mucus membranes  NECK: supple, non-tender, no JVD, no LAD  LUNGS: non-labored breathing, CTAB, no wheezing/rales/rhonchi  CV: RRR, no m/r/g, 2+ palpable peripheral pulses bi/l, cap refill <2 secs  ABD: non-distended, soft, non-tender, no rebound tenderness or guarding  : no CVA tenderness  MSK: full ROM, strength 5/5 bi/l  EXT: warm and well-perfused, trace peripheral edema  SKIN: no rashes or lesions  NEURO: AAOx1, no focal deficits

## 2023-12-11 NOTE — CONSULT NOTE ADULT - SUBJECTIVE AND OBJECTIVE BOX
Cardiology Consult Note   [Please check [email or web address] password: "[email or web address]" for cardiology service schedule and contact information]    History of Present Illness:   Ms. Maldonado is an 88 yo woman w/ hx of HFrEF (15-20%), CAD w/ 1DES in 2006, known LBBB, HTN, HLD mild dementia and ataxia who presented to the ED w/ SOB.    Hx obtained from chart review as pt did not recall. Per report, pt was walking up the stairs when she developed CP and SOB. Granddaughter reported the pt was going up the stairs and became extremely short of breath by the time she got to the top, looked like she was going to pass out.  And was holding her chest at the time. Per granddaughter who helps care for her she has history of CHF hypertension hyperlipidemia, is on medications and regularly takes them. Up until today no recent fevers, chills, cough, vomiting, edema.  Patient's granddaughter carefully monitors her weight and fluid/diet intake, reports she did go away for the weekend to another family member and is unsure if she strictly adhered to it however did definitely take her medications as confirmed by family.  Also reporting that she has not had any increase in lower extremity edema, her legs have been normal-appearing. EMS was called who found her to be hypoxic w/ SpO2 85% on RA.     In the ED she was HDS w/ normal HR, BP 90-130s, and normal SpO2. Labs showed HS-trop 420 > 548, BNP 1979, Cr 1.6. CXR was relatively clear.    She follows w/ Dr. López, last visit in 10/2023. She has deferred ICD implantation.     TTE  3/3/2023, dilated LA, severe pulmonary hypertension, PASP 64 mmHg, severe global LV dysfunction, LVEF 15-20%    EKG:  LBBB not meeting scarbosas criteria    HOME CARDIAC MEDS:  ASA 81mg qd  Atrova 20mg qd  Metop 25mg qd  Sacubitril-Valsartan 24-26mg qd  Furosemide 40mg qd  Dapagliflozin 10mg qd    PMHx: reviewed, see below  SOCIAL HISTORY:  unchanged    REVIEW OF SYSTEMS:  CV: chest pain (-), palpitation (-), orthopnea (-), PND (-), edema (-)  PULM: SOB (-), cough (-), wheezing (-), hemoptysis (-).   CONST: fever (-), chills (-) or fatigue (-)  GI: abdominal distension (-), abdominal pain (-) , nausea/vomiting (-), hematemesis, (-), melena (-), hematochezia (-)  : dysuria (-), frequency (-), hematuria (-).   NEURO: numbness (-), weakness (-), dizziness (-)  SKIN: itching (-), rash (-)  HEENT:  visual changes (-); vertigo or throat pain (-);  neck stiffness (-)     All other review of systems is negative unless indicated above.   -------------------------------------------------------------------------------------------  VITALS:  T(C): 36.6 (12-11-23 @ 15:45), Max: 36.6 (12-11-23 @ 15:45)  HR: 89 (12-11-23 @ 15:45) (89 - 98)  BP: 132/82 (12-11-23 @ 15:45) (99/66 - 132/82)  RR: 18 (12-11-23 @ 15:45) (18 - 24)  SpO2: 99% (12-11-23 @ 15:45) (95% - 99%)  Wt(kg): --  I&O's Summary      PHYSICAL EXAM:  GEN: NAD, sitting in bed  HEENT: NCAT, EOMI  CV: RRR, Ns1/s2, no m/r/g, JVP not elevated  RESP: CTA B/l, no w/r/r  ABD: soft, nt, nd  EXT: warm, 2+ pulses, no edema  SKIN: no visible lesions, rashes  NEURO: AAOx3, no focal deficits  PSYCH: Calm, cooperative    -------------------------------------------------------------------------------------------  LABS:                          14.2   5.53  )-----------( 183      ( 11 Dec 2023 12:38 )             45.6     12-11    143  |  102  |  28<H>  ----------------------------<  148<H>  4.0   |  24  |  1.61<H>    Ca    10.2      11 Dec 2023 12:38    TPro  8.5<H>  /  Alb  4.8  /  TBili  0.7  /  DBili  x   /  AST  56<H>  /  ALT  51<H>  /  AlkPhos  73  12-11      CARDIAC MARKERS ( 11 Dec 2023 15:40 )  548 ng/L / x     / x     / x     / x     / x      CARDIAC MARKERS ( 11 Dec 2023 12:38 )  421 ng/L / x     / x     / x     / x     / x          -------------------------------------------------------------------------------------------  Meds:    -------------------------------------------------------------------------------------------  Cardiovascular Diagnostic Testing:      -------------------------------------------------------------------------------------------  Assessment and Plan:   Ms. Maldonado is an 88 yo woman w/ hx of HFrEF (15-20%), CAD w/ 1DES in 2006, known LBBB, HTN, HLD mild dementia and ataxia who presented to the ED w/ SOB found to have trop elevation.    #Trop elevation  #HFrEF  #CAD  #HTN  #LBBB    Recommendations:  -     *** Recommendations are preliminary until cosigned by the attending.    Rahul De La Cruz MD  Cardiology Fellow    For all New Consults and Questions:  [email or web address]   Login: "[email or web address]"   Cardiology Consult Note   [Please check [email or web address] password: [email or web address]" for cardiology service schedule and contact information]    History of Present Illness:   Ms. Maldonado is an 88 yo woman w/ hx of HFrEF (15-20%), CAD w/ 1DES in 2006, known LBBB, HTN, HLD mild dementia and ataxia who presented to the ED w/ SOB.    Hx obtained from chart review as pt did not recall. Per report, pt was walking up the stairs when she developed CP and SOB. Granddaughter reported the pt was going up the stairs and became extremely short of breath by the time she got to the top, looked like she was going to pass out.  And was holding her chest at the time. Per granddaughter who helps care for her she has history of CHF hypertension hyperlipidemia, is on medications and regularly takes them. Up until today no recent fevers, chills, cough, vomiting, edema.  Patient's granddaughter carefully monitors her weight and fluid/diet intake, reports she did go away for the weekend to another family member and is unsure if she strictly adhered to it however did definitely take her medications as confirmed by family.  Also reporting that she has not had any increase in lower extremity edema, her legs have been normal-appearing. EMS was called who found her to be hypoxic w/ SpO2 85% on RA.     In the ED she was HDS w/ normal HR, BP 90-130s, and normal SpO2. Labs showed HS-trop 420 > 548, BNP 1979, Cr 1.6. CXR was relatively clear.    She follows w/ Dr. López, last visit in 10/2023. She has deferred ICD implantation.     TTE  3/3/2023, dilated LA, severe pulmonary hypertension, PASP 64 mmHg, severe global LV dysfunction, LVEF 15-20%    EKG:  LBBB not meeting scarbosas criteria    HOME CARDIAC MEDS:  ASA 81mg qd  Atrova 20mg qd  Metop 25mg qd  Sacubitril-Valsartan 24-26mg qd  Furosemide 40mg qd  Dapagliflozin 10mg qd    PMHx: reviewed, see below  SOCIAL HISTORY:  unchanged    REVIEW OF SYSTEMS:  CV: chest pain (-), palpitation (-), orthopnea (-), PND (-), edema (-)  PULM: SOB (-), cough (-), wheezing (-), hemoptysis (-).   CONST: fever (-), chills (-) or fatigue (-)  GI: abdominal distension (-), abdominal pain (-) , nausea/vomiting (-), hematemesis, (-), melena (-), hematochezia (-)  : dysuria (-), frequency (-), hematuria (-).   NEURO: numbness (-), weakness (-), dizziness (-)  SKIN: itching (-), rash (-)  HEENT:  visual changes (-); vertigo or throat pain (-);  neck stiffness (-)     All other review of systems is negative unless indicated above.   -------------------------------------------------------------------------------------------  VITALS:  T(C): 36.6 (12-11-23 @ 15:45), Max: 36.6 (12-11-23 @ 15:45)  HR: 89 (12-11-23 @ 15:45) (89 - 98)  BP: 132/82 (12-11-23 @ 15:45) (99/66 - 132/82)  RR: 18 (12-11-23 @ 15:45) (18 - 24)  SpO2: 99% (12-11-23 @ 15:45) (95% - 99%)  Wt(kg): --  I&O's Summary      PHYSICAL EXAM:  GEN: NAD, sitting in bed  HEENT: NCAT, EOMI  CV: RRR, Ns1/s2, no m/r/g, JVP not elevated  RESP: CTA B/l, no w/r/r  ABD: soft, nt, nd  EXT: warm, 2+ pulses, no edema  SKIN: no visible lesions, rashes  NEURO: AAOx3, no focal deficits  PSYCH: Calm, cooperative    -------------------------------------------------------------------------------------------  LABS:                          14.2   5.53  )-----------( 183      ( 11 Dec 2023 12:38 )             45.6     12-11    143  |  102  |  28<H>  ----------------------------<  148<H>  4.0   |  24  |  1.61<H>    Ca    10.2      11 Dec 2023 12:38    TPro  8.5<H>  /  Alb  4.8  /  TBili  0.7  /  DBili  x   /  AST  56<H>  /  ALT  51<H>  /  AlkPhos  73  12-11      CARDIAC MARKERS ( 11 Dec 2023 15:40 )  548 ng/L / x     / x     / x     / x     / x      CARDIAC MARKERS ( 11 Dec 2023 12:38 )  421 ng/L / x     / x     / x     / x     / x          -------------------------------------------------------------------------------------------  Meds:    -------------------------------------------------------------------------------------------  Cardiovascular Diagnostic Testing:      -------------------------------------------------------------------------------------------  Assessment and Plan:   Ms. Maldonado is an 88 yo woman w/ hx of HFrEF (15-20%), CAD w/ 1DES in 2006, known LBBB, HTN, HLD mild dementia and ataxia who presented to the ED w/ SOB found to have trop elevation.    #Trop elevation  #HFrEF  #CAD  #HTN  #LBBB    Recommendations:  -     *** Recommendations are preliminary until cosigned by the attending.    Rahul De La Cruz MD  Cardiology Fellow    For all New Consults and Questions:  [email or web address]   Login: [email or web address]   Cardiology Consult Note   [Please check [email or web address] password: [email or web address]" for cardiology service schedule and contact information]    History of Present Illness:   Ms. Maldonado is an 86 yo woman w/ hx of HFrEF (15-20%), CAD w/ 1DES in 2006, known LBBB, HTN, HLD mild dementia and ataxia who presented to the ED w/ SOB.    Hx obtained from chart review as pt did not recall. Per granddaughter, pt had been complaining of intermittent chest pressure for the past day and then this morning pt was walking up the stairs when she developed CP and SOB. Granddaughter reported the pt was going up the stairs and became extremely short of breath by the time she got to the top, looked like she was going to pass out.  And was holding her chest at the time. Per granddaughter who helps care for her she has history of CHF hypertension hyperlipidemia, is on medications and regularly takes them. Up until today no recent fevers, chills, cough, vomiting, edema.  Patient's granddaughter carefully monitors her weight and fluid/diet intake, reports she did go away for the weekend to another family member and is unsure if she strictly adhered to it however did definitely take her medications as confirmed by family.  Also reporting that she has not had any increase in lower extremity edema, her legs have been normal-appearing. EMS was called who found her to be hypoxic w/ SpO2 85% on RA.     In the ED she was HDS w/ normal HR, BP 90-130s, and normal SpO2. Labs showed HS-trop 420 > 548, BNP 1979, Cr 1.6. CXR was relatively clear. She is currently CP free and w/o SOB.    She follows w/ Dr. López, last visit in 10/2023. She has deferred ICD implantation at that time. Granddaughter at the bedside also confirms that decision and says at this time no invasive procedures would be desired, including a [facility]    TTE  3/3/2023, dilated LA, severe pulmonary hypertension, PASP 64 mmHg, severe global LV dysfunction, LVEF 15-20%    EKG:  LBBB not meeting scarbosas criteria    HOME CARDIAC MEDS:  ASA 81mg qd  Atrova 20mg qd  Metop 25mg qd  Sacubitril-Valsartan 24-26mg qd  Furosemide 40mg qd  Dapagliflozin 10mg qd    PMHx: reviewed, see below  SOCIAL HISTORY:  unchanged    REVIEW OF SYSTEMS:  CV: chest pain (-), palpitation (-), orthopnea (-), PND (-), edema (-)  PULM: SOB (-), cough (-), wheezing (-), hemoptysis (-).   CONST: fever (-), chills (-) or fatigue (-)  GI: abdominal distension (-), abdominal pain (-) , nausea/vomiting (-), hematemesis, (-), melena (-), hematochezia (-)  : dysuria (-), frequency (-), hematuria (-).   NEURO: numbness (-), weakness (-), dizziness (-)  SKIN: itching (-), rash (-)  HEENT:  visual changes (-); vertigo or throat pain (-);  neck stiffness (-)     All other review of systems is negative unless indicated above.   -------------------------------------------------------------------------------------------  VITALS:  T(C): 36.6 (12-11-23 @ 15:45), Max: 36.6 (12-11-23 @ 15:45)  HR: 89 (12-11-23 @ 15:45) (89 - 98)  BP: 132/82 (12-11-23 @ 15:45) (99/66 - 132/82)  RR: 18 (12-11-23 @ 15:45) (18 - 24)  SpO2: 99% (12-11-23 @ 15:45) (95% - 99%)  Wt(kg): --  I&O's Summary      PHYSICAL EXAM:  GEN: NAD, sitting in bed  HEENT: NCAT, EOMI  CV: RRR, Ns1/s2, no m/r/g, JVP not elevated  RESP: CTA B/l, no w/r/r  ABD: soft, nt, nd  EXT: warm, 2+ pulses, no edema  SKIN: no visible lesions, rashes  NEURO: AAOx3, no focal deficits  PSYCH: Calm, cooperative    -------------------------------------------------------------------------------------------  LABS:                          14.2   5.53  )-----------( 183      ( 11 Dec 2023 12:38 )             45.6     12-11    143  |  102  |  28<H>  ----------------------------<  148<H>  4.0   |  24  |  1.61<H>    Ca    10.2      11 Dec 2023 12:38    TPro  8.5<H>  /  Alb  4.8  /  TBili  0.7  /  DBili  x   /  AST  56<H>  /  ALT  51<H>  /  AlkPhos  73  12-11      CARDIAC MARKERS ( 11 Dec 2023 15:40 )  548 ng/L / x     / x     / x     / x     / x      CARDIAC MARKERS ( 11 Dec 2023 12:38 )  421 ng/L / x     / x     / x     / x     / x          -------------------------------------------------------------------------------------------  Meds:    -------------------------------------------------------------------------------------------  Cardiovascular Diagnostic Testing:      -------------------------------------------------------------------------------------------  Assessment and Plan:   Ms. Maldonado is an 86 yo woman w/ hx of HFrEF (15-20%), CAD w/ 1DES in 2006, known LBBB, HTN, HLD mild dementia and ataxia who presented to the ED w/ SOB found to have trop elevation.    #Trop elevation  #HFrEF  #CAD  #HTN  #LBBB  Story is more consistent w/ ischemic type sx and less c/w HF. Pt currently also appears euvolemic and w/o signs of gross overload on exam. Trop elevated 400s > 500s, and EKG is showing LBBB not meeting sgarbosa's criteria. Overall there is c/f ischemia given sx of intermittent CP y/d and then exertional CP this morning w/ the associated trop elevation. Other causes of trop elevation, including HTN, volume overload, anemia, sepsis are not found here and are less likely. Discussed plan w/ granddaughter who thinks pt would not want invasive procedures so will opt for medical management.    Recommendations:  - Please start heparin gtt, ACS protocol  - Plavix 300mg now, then continue plavix 75mg qd  - please get a formal TTE  - continue ASA 81mg, atorvastatin  - continue home entresto 24-26  - continue home furosemide 40mg  - continue home metop 25  - can hold home dapagliflozin until DC  - no invasive ischemic eval planned given pt and family's desires for less aggressive measures    *** Recommendations are preliminary until cosigned by the attending.    Rahul De La Cruz MD  Cardiology Fellow    For all New Consults and Questions:  [email or web address]   Login: [employer]   Cardiology Consult Note   [Please check [email or web address] password: "[email or web address]" for cardiology service schedule and contact information]    History of Present Illness:   Ms. Maldonado is an 86 yo woman w/ hx of HFrEF (15-20%), CAD w/ 1DES in 2006, known LBBB, HTN, HLD mild dementia and ataxia who presented to the ED w/ SOB.    Hx obtained from chart review as pt did not recall. Per granddaughter, pt had been complaining of intermittent chest pressure for the past day and then this morning pt was walking up the stairs when she developed CP and SOB. Granddaughter reported the pt was going up the stairs and became extremely short of breath by the time she got to the top, looked like she was going to pass out.  And was holding her chest at the time. Per granddaughter who helps care for her she has history of CHF hypertension hyperlipidemia, is on medications and regularly takes them. Up until today no recent fevers, chills, cough, vomiting, edema.  Patient's granddaughter carefully monitors her weight and fluid/diet intake, reports she did go away for the weekend to another family member and is unsure if she strictly adhered to it however did definitely take her medications as confirmed by family.  Also reporting that she has not had any increase in lower extremity edema, her legs have been normal-appearing. EMS was called who found her to be hypoxic w/ SpO2 85% on RA.     In the ED she was HDS w/ normal HR, BP 90-130s, and normal SpO2. Labs showed HS-trop 420 > 548, BNP 1979, Cr 1.6. CXR was relatively clear. She is currently CP free and w/o SOB.    She follows w/ Dr. López, last visit in 10/2023. She has deferred ICD implantation at that time. Granddaughter at the bedside also confirms that decision and says at this time no invasive procedures would be desired, including a [facility]    TTE  3/3/2023, dilated LA, severe pulmonary hypertension, PASP 64 mmHg, severe global LV dysfunction, LVEF 15-20%    EKG:  LBBB not meeting scarbosas criteria    HOME CARDIAC MEDS:  ASA 81mg qd  Atrova 20mg qd  Metop 25mg qd  Sacubitril-Valsartan 24-26mg qd  Furosemide 40mg qd  Dapagliflozin 10mg qd    PMHx: reviewed, see below  SOCIAL HISTORY:  unchanged    REVIEW OF SYSTEMS:  CV: chest pain (-), palpitation (-), orthopnea (-), PND (-), edema (-)  PULM: SOB (-), cough (-), wheezing (-), hemoptysis (-).   CONST: fever (-), chills (-) or fatigue (-)  GI: abdominal distension (-), abdominal pain (-) , nausea/vomiting (-), hematemesis, (-), melena (-), hematochezia (-)  : dysuria (-), frequency (-), hematuria (-).   NEURO: numbness (-), weakness (-), dizziness (-)  SKIN: itching (-), rash (-)  HEENT:  visual changes (-); vertigo or throat pain (-);  neck stiffness (-)     All other review of systems is negative unless indicated above.   -------------------------------------------------------------------------------------------  VITALS:  T(C): 36.6 (12-11-23 @ 15:45), Max: 36.6 (12-11-23 @ 15:45)  HR: 89 (12-11-23 @ 15:45) (89 - 98)  BP: 132/82 (12-11-23 @ 15:45) (99/66 - 132/82)  RR: 18 (12-11-23 @ 15:45) (18 - 24)  SpO2: 99% (12-11-23 @ 15:45) (95% - 99%)  Wt(kg): --  I&O's Summary      PHYSICAL EXAM:  GEN: NAD, sitting in bed  HEENT: NCAT, EOMI  CV: RRR, Ns1/s2, no m/r/g, JVP not elevated  RESP: CTA B/l, no w/r/r  ABD: soft, nt, nd  EXT: warm, 2+ pulses, no edema  SKIN: no visible lesions, rashes  NEURO: AAOx3, no focal deficits  PSYCH: Calm, cooperative    -------------------------------------------------------------------------------------------  LABS:                          14.2   5.53  )-----------( 183      ( 11 Dec 2023 12:38 )             45.6     12-11    143  |  102  |  28<H>  ----------------------------<  148<H>  4.0   |  24  |  1.61<H>    Ca    10.2      11 Dec 2023 12:38    TPro  8.5<H>  /  Alb  4.8  /  TBili  0.7  /  DBili  x   /  AST  56<H>  /  ALT  51<H>  /  AlkPhos  73  12-11      CARDIAC MARKERS ( 11 Dec 2023 15:40 )  548 ng/L / x     / x     / x     / x     / x      CARDIAC MARKERS ( 11 Dec 2023 12:38 )  421 ng/L / x     / x     / x     / x     / x          -------------------------------------------------------------------------------------------  Meds:    -------------------------------------------------------------------------------------------  Cardiovascular Diagnostic Testing:      -------------------------------------------------------------------------------------------  Assessment and Plan:   Ms. Maldonado is an 86 yo woman w/ hx of HFrEF (15-20%), CAD w/ 1DES in 2006, known LBBB, HTN, HLD mild dementia and ataxia who presented to the ED w/ SOB found to have trop elevation.    #Trop elevation  #HFrEF  #CAD  #HTN  #LBBB  Story is more consistent w/ ischemic type sx and less c/w HF. Pt currently also appears euvolemic and w/o signs of gross overload on exam. Trop elevated 400s > 500s, and EKG is showing LBBB not meeting sgarbosa's criteria. Overall there is c/f ischemia given sx of intermittent CP y/d and then exertional CP this morning w/ the associated trop elevation. Other causes of trop elevation, including HTN, volume overload, anemia, sepsis are not found here and are less likely. Discussed plan w/ granddaughter who thinks pt would not want invasive procedures so will opt for medical management.    Recommendations:  - Please start heparin gtt, ACS protocol  - Plavix 300mg now, then continue plavix 75mg qd  - please get a formal TTE  - continue ASA 81mg, atorvastatin  - continue home entresto 24-26  - continue home furosemide 40mg  - continue home metop 25  - can hold home dapagliflozin until DC  - no invasive ischemic eval planned given pt and family's desires for less aggressive measures    *** Recommendations are preliminary until cosigned by the attending.    Rahul De La Cruz MD  Cardiology Fellow    For all New Consults and Questions:  [email or web address]   Login: [employer]   Cardiology Consult Note   [Please check [email or web address] password: "[email or web address]" for cardiology service schedule and contact information]    History of Present Illness:   Ms. Maldonado is an 88 yo woman w/ hx of HFrEF (15-20%), CAD w/ 1DES in 2006, known LBBB, HTN, HLD mild dementia and ataxia who presented to the ED w/ SOB.    Hx obtained from chart review as pt did not recall. Per granddaughter, pt had been complaining of intermittent chest pressure for the past day and then this morning pt was walking up the stairs when she developed CP and SOB. Granddaughter reported the pt was going up the stairs and became extremely short of breath by the time she got to the top, looked like she was going to pass out.  And was holding her chest at the time. Per granddaughter who helps care for her she has history of CHF hypertension hyperlipidemia, is on medications and regularly takes them. Up until today no recent fevers, chills, cough, vomiting, edema.  Patient's granddaughter carefully monitors her weight and fluid/diet intake, reports she did go away for the weekend to another family member and is unsure if she strictly adhered to it however did definitely take her medications as confirmed by family.  Also reporting that she has not had any increase in lower extremity edema, her legs have been normal-appearing. EMS was called who found her to be hypoxic w/ SpO2 85% on RA.     In the ED she was HDS w/ normal HR, BP 90-130s, and normal SpO2. Labs showed HS-trop 420 > 548, BNP 1979, Cr 1.6. CXR was relatively clear. She is currently CP free and w/o SOB.    She follows w/ Dr. López, last visit in 10/2023. She has deferred ICD implantation at that time. Granddaughter at the bedside also confirms that decision and says at this time no invasive procedures would be desired, including a [facility]    TTE  3/3/2023, dilated LA, severe pulmonary hypertension, PASP 64 mmHg, severe global LV dysfunction, LVEF 15-20%    EKG:  LBBB not meeting scarbosas criteria    HOME CARDIAC MEDS:  ASA 81mg qd  Atrova 20mg qd  Metop 25mg qd  Sacubitril-Valsartan 24-26mg qd  Furosemide 40mg qd  Dapagliflozin 10mg qd    PMHx: reviewed, see below  SOCIAL HISTORY:  unchanged    REVIEW OF SYSTEMS:  CV: chest pain (-), palpitation (-), orthopnea (-), PND (-), edema (-)  PULM: SOB (-), cough (-), wheezing (-), hemoptysis (-).   CONST: fever (-), chills (-) or fatigue (-)  GI: abdominal distension (-), abdominal pain (-) , nausea/vomiting (-), hematemesis, (-), melena (-), hematochezia (-)  : dysuria (-), frequency (-), hematuria (-).   NEURO: numbness (-), weakness (-), dizziness (-)  SKIN: itching (-), rash (-)  HEENT:  visual changes (-); vertigo or throat pain (-);  neck stiffness (-)     All other review of systems is negative unless indicated above.   -------------------------------------------------------------------------------------------  VITALS:  T(C): 36.6 (12-11-23 @ 15:45), Max: 36.6 (12-11-23 @ 15:45)  HR: 89 (12-11-23 @ 15:45) (89 - 98)  BP: 132/82 (12-11-23 @ 15:45) (99/66 - 132/82)  RR: 18 (12-11-23 @ 15:45) (18 - 24)  SpO2: 99% (12-11-23 @ 15:45) (95% - 99%)  Wt(kg): --  I&O's Summary      PHYSICAL EXAM:  GEN: NAD, sitting in bed  HEENT: NCAT, EOMI  CV: RRR, Ns1/s2, no m/r/g, JVP not elevated  RESP: CTA B/l, no w/r/r  ABD: soft, nt, nd  EXT: warm, 2+ pulses, no edema  SKIN: no visible lesions, rashes  NEURO: AAOx3, no focal deficits  PSYCH: Calm, cooperative    -------------------------------------------------------------------------------------------  LABS:                          14.2   5.53  )-----------( 183      ( 11 Dec 2023 12:38 )             45.6     12-11    143  |  102  |  28<H>  ----------------------------<  148<H>  4.0   |  24  |  1.61<H>    Ca    10.2      11 Dec 2023 12:38    TPro  8.5<H>  /  Alb  4.8  /  TBili  0.7  /  DBili  x   /  AST  56<H>  /  ALT  51<H>  /  AlkPhos  73  12-11      CARDIAC MARKERS ( 11 Dec 2023 15:40 )  548 ng/L / x     / x     / x     / x     / x      CARDIAC MARKERS ( 11 Dec 2023 12:38 )  421 ng/L / x     / x     / x     / x     / x          -------------------------------------------------------------------------------------------  Meds:    -------------------------------------------------------------------------------------------  Cardiovascular Diagnostic Testing:      -------------------------------------------------------------------------------------------  Assessment and Plan:   Ms. Maldonado is an 88 yo woman w/ hx of HFrEF (15-20%), CAD w/ 1DES in 2006, known LBBB, HTN, HLD mild dementia and ataxia who presented to the ED w/ SOB found to have trop elevation.    #Trop elevation  #HFrEF  #CAD  #HTN  #LBBB  Story is more consistent w/ ischemic type sx and less c/w HF. Pt currently also appears euvolemic and w/o signs of gross overload on exam. Trop elevated 400s > 500s, and EKG is showing LBBB not meeting sgarbosa's criteria. Overall there is c/f ischemia given sx of intermittent CP y/d and then exertional CP this morning w/ the associated trop elevation. Other causes of trop elevation, including HTN, volume overload, anemia, sepsis are not found here and are less likely. Discussed plan w/ granddaughter who thinks pt would not want invasive procedures so will opt for medical management.    Recommendations:  - Please start heparin gtt, ACS protocol for 48h  - Plavix 300mg now, then continue plavix 75mg qd  - trend trop to peak, please add on CK and CKMB  - please get a formal TTE  - continue ASA 81mg, atorvastatin  - continue home entresto 24-26  - continue home furosemide 40mg  - continue home metop 25  - can hold home dapagliflozin until DC  - no invasive ischemic eval planned given pt and family's desires for less aggressive measures    *** Recommendations are preliminary until cosigned by the attending.    Rahul De La Cruz MD  Cardiology Fellow    For all New Consults and Questions:  [email or web address]   Login: [email or web address]   Cardiology Consult Note   [Please check [email or web address]: [email or web address] for cardiology service schedule and contact information]    History of Present Illness:   Ms. Maldonado is an 86 yo woman w/ hx of HFrEF (15-20%), CAD w/ 1DES in 2006, known LBBB, HTN, HLD mild dementia and ataxia who presented to the ED w/ SOB.    Hx obtained from chart review as pt did not recall. Per granddaughter, pt had been complaining of intermittent chest pressure for the past day and then this morning pt was walking up the stairs when she developed CP and SOB. Granddaughter reported the pt was going up the stairs and became extremely short of breath by the time she got to the top, looked like she was going to pass out.  And was holding her chest at the time. Per granddaughter who helps care for her she has history of CHF hypertension hyperlipidemia, is on medications and regularly takes them. Up until today no recent fevers, chills, cough, vomiting, edema.  Patient's granddaughter carefully monitors her weight and fluid/diet intake, reports she did go away for the weekend to another family member and is unsure if she strictly adhered to it however did definitely take her medications as confirmed by family.  Also reporting that she has not had any increase in lower extremity edema, her legs have been normal-appearing. EMS was called who found her to be hypoxic w/ SpO2 85% on RA.     In the ED she was HDS w/ normal HR, BP 90-130s, and normal SpO2. Labs showed HS-trop 420 > 548, BNP 1979, Cr 1.6. CXR was relatively clear. She is currently CP free and w/o SOB.    She follows w/ Dr. López, last visit in 10/2023. She has deferred ICD implantation at that time. Granddaughter at the bedside also confirms that decision and says at this time no invasive procedures would be desired, including a [facility]    TTE  3/3/2023, dilated LA, severe pulmonary hypertension, PASP 64 mmHg, severe global LV dysfunction, LVEF 15-20%    EKG:  LBBB not meeting scarbosas criteria    HOME CARDIAC MEDS:  ASA 81mg qd  Atrova 20mg qd  Metop 25mg qd  Sacubitril-Valsartan 24-26mg qd  Furosemide 40mg qd  Dapagliflozin 10mg qd    PMHx: reviewed, see below  SOCIAL HISTORY:  unchanged    REVIEW OF SYSTEMS:  CV: chest pain (-), palpitation (-), orthopnea (-), PND (-), edema (-)  PULM: SOB (-), cough (-), wheezing (-), hemoptysis (-).   CONST: fever (-), chills (-) or fatigue (-)  GI: abdominal distension (-), abdominal pain (-) , nausea/vomiting (-), hematemesis, (-), melena (-), hematochezia (-)  : dysuria (-), frequency (-), hematuria (-).   NEURO: numbness (-), weakness (-), dizziness (-)  SKIN: itching (-), rash (-)  HEENT:  visual changes (-); vertigo or throat pain (-);  neck stiffness (-)     All other review of systems is negative unless indicated above.   -------------------------------------------------------------------------------------------  VITALS:  T(C): 36.6 (12-11-23 @ 15:45), Max: 36.6 (12-11-23 @ 15:45)  HR: 89 (12-11-23 @ 15:45) (89 - 98)  BP: 132/82 (12-11-23 @ 15:45) (99/66 - 132/82)  RR: 18 (12-11-23 @ 15:45) (18 - 24)  SpO2: 99% (12-11-23 @ 15:45) (95% - 99%)  Wt(kg): --  I&O's Summary      PHYSICAL EXAM:  GEN: NAD, sitting in bed  HEENT: NCAT, EOMI  CV: RRR, Ns1/s2, no m/r/g, JVP not elevated  RESP: CTA B/l, no w/r/r  ABD: soft, nt, nd  EXT: warm, 2+ pulses, no edema  SKIN: no visible lesions, rashes  NEURO: AAOx3, no focal deficits  PSYCH: Calm, cooperative    -------------------------------------------------------------------------------------------  LABS:                          14.2   5.53  )-----------( 183      ( 11 Dec 2023 12:38 )             45.6     12-11    143  |  102  |  28<H>  ----------------------------<  148<H>  4.0   |  24  |  1.61<H>    Ca    10.2      11 Dec 2023 12:38    TPro  8.5<H>  /  Alb  4.8  /  TBili  0.7  /  DBili  x   /  AST  56<H>  /  ALT  51<H>  /  AlkPhos  73  12-11      CARDIAC MARKERS ( 11 Dec 2023 15:40 )  548 ng/L / x     / x     / x     / x     / x      CARDIAC MARKERS ( 11 Dec 2023 12:38 )  421 ng/L / x     / x     / x     / x     / x          -------------------------------------------------------------------------------------------  Meds:    -------------------------------------------------------------------------------------------  Cardiovascular Diagnostic Testing:      -------------------------------------------------------------------------------------------  Assessment and Plan:   Ms. Maldonado is an 86 yo woman w/ hx of HFrEF (15-20%), CAD w/ 1DES in 2006, known LBBB, HTN, HLD mild dementia and ataxia who presented to the ED w/ SOB found to have trop elevation.    #Trop elevation  #HFrEF  #CAD  #HTN  #LBBB  Story is more consistent w/ ischemic type sx and less c/w HF. Pt currently also appears euvolemic and w/o signs of gross overload on exam. Trop elevated 400s > 500s, and EKG is showing LBBB not meeting sgarbosa's criteria. Overall there is c/f ischemia given sx of intermittent CP y/d and then exertional CP this morning w/ the associated trop elevation. Other causes of trop elevation, including HTN, volume overload, anemia, sepsis are not found here and are less likely. Discussed plan w/ granddaughter who thinks pt would not want invasive procedures so will opt for medical management.    Recommendations:  - Please start heparin gtt, ACS protocol for 48h  - Plavix 300mg now, then continue plavix 75mg qd  - trend trop to peak, please add on CK and CKMB  - please get a formal TTE  - continue ASA 81mg, atorvastatin  - continue home entresto 24-26  - continue home furosemide 40mg  - continue home metop 25  - can hold home dapagliflozin until DC  - no invasive ischemic eval planned given pt and family's desires for less aggressive measures    *** Recommendations are preliminary until cosigned by the attending.    Rahul De La Cruz MD  Cardiology Fellow    For all New Consults and Questions:  [email or web address]   Login: [employer]

## 2023-12-11 NOTE — H&P ADULT - NSHPSOCIALHISTORY_GEN_ALL_CORE
Home: Domiciled   ADL: Full ADL   Alcohol: Denies  Smoking: Denies   Drugs: Denies Home: Domiciled at home with granddaughter.  ADL: requires complete assistance with IADLs, performs some ADLs  Alcohol: Denies  Smoking: Denies   Drugs: Denies

## 2023-12-11 NOTE — H&P ADULT - PROBLEM SELECTOR PLAN 3
> cont home entresto 24-26, furosemide 40, toprol 25  > hold home dapagliflozin Lactic acidosis with respiratory acidosis. Possibly emphesematous CO2 retention?        > Trend lactate Lactic acidosis with respiratory acidosis. Possibly emphysematous CO2 retention?  - pCO2 may be at baseline, acidosis driven primarily by elevated lactate  > consider NIPPV if  pCO2 increases further  > Trend lactate Lactic acidosis with respiratory acidosis. Possibly emphysematous CO2 retention?  - pCO2 may be at baseline, acidosis driven primarily by elevated lactate  > consider NIPPV if  pCO2 increases further  > Trend lactate  > f/u Bcx in lab Lactic acidosis with respiratory acidosis. CXR without gross congestion or emphysematous changes. No infectious signs/symptoms.  - pCO2 may be at baseline, acidosis driven primarily by elevated lactate  > consider NIPPV if  pCO2 increases further  > Trend lactate  > f/u Bcx in lab Lactic acidosis with respiratory acidosis. Saturating well on room air. CXR without gross congestion or emphysematous changes. No overt infectious signs/symptoms.  - pCO2 may be at baseline, acidosis driven primarily by elevated lactate  > consider NIPPV if  pCO2 increases further, can consider ABG to clarify respiratory status  > Trend lactate  > f/u Bcx - in lab Lactic acidosis with respiratory acidosis. Saturating well on room air. CXR without gross congestion or emphysematous changes. No overt infectious signs/symptoms appreciated.  - pCO2 may be at baseline, acidosis driven primarily by elevated lactate  > consider NIPPV if  pCO2 increases further, can consider ABG to clarify respiratory status  > Trend lactate  > f/u Bcx - in lab

## 2023-12-11 NOTE — H&P ADULT - NSHPREVIEWOFSYSTEMS_GEN_ALL_CORE
Constitutional: denies weight loss, fatigue, fevers, chills  Skin: denies rashes or wounds  HEENT: no vision or hearing changes  CV: denies VILLELA or RONNY  Resp: denies SOB or cough  GI: denies diarrhea, constipation, N/V, or changes to appetite  Urinary: denies urgency, dysuria  Neurologic: denies headache or pain  MSK: denies arthralgias or myalgias  Hematologic: denies bleeding or easy bruising  Lymphatics: denies LAD or recent infection Unable to reliably attain due to dementia.

## 2023-12-11 NOTE — H&P ADULT - CONVERSATION DETAILS
Goals of care discussed with granddaughter who indicated that a prior MOLST had been completed, but is unavailable.  Patient is DNR/DNI, with allowance for trial of IVF and antibiotics; no NGT, hemodialysis.  New MOLST completed.  Resident and writer encouraged questions and answered all inquiries. Family also advised to ask questions in the future, if additional information is needed.

## 2023-12-11 NOTE — H&P ADULT - PROBLEM SELECTOR PLAN 10
DVT PPx: Hep gtt  Diet: DASH  Code: DNR/DNI, MOLST in ED chart  Dispo: PT ordered, f/u recs  Communication: granddaughter Justa Hebert ([phone])    Discharge information:  Pharmacy -   PCP - Rosie Lawrence DVT PPx: Hep gtt  Diet: DASH  Code: DNR/DNI, MOLST in ED chart  Dispo: PT ordered, f/u recs  Communication: granddaughter Justa Hebert ([phone])    Discharge information:  Pharmacy -   PCP - Rosie Lawrence

## 2023-12-11 NOTE — H&P ADULT - PROBLEM SELECTOR PLAN 8
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

## 2023-12-11 NOTE — H&P ADULT - ATTENDING COMMENTS
88 year old female, with past history as noted above, being managed/treated for acute on chronic heart failure; has EF of 25 to 30% and severe pulmonary hypertension on TTE of 04/03/2023.  S/p furosemide 40 mg IV earlier, with report by RN that patient had significant amount of urine output (confirmed by family).  Repeat dose scheduled for the AM.  Need to evaluate for continued daily dosing - same as PTA or increased dosing, in the context of renal failure.  Intake/output Q6H, HOB elevation, daily weight, fluid restriction of 1.2 liters daily.  Has NSTEMI, in the context of CHF exacerbation; on heparin drip and is being followed by Cardiology team (appreciated).  F/u AM lab-work, TTE.    All other management as prescribed.

## 2023-12-11 NOTE — H&P ADULT - HISTORY OF PRESENT ILLNESS
88F hx HFrEF (15-20%; 4/2023), CAD (s/p JASMYNE x1 2006, known LBBB), HTN, HLD, and mild dementia and ataxia presented to [facility] ED reporting SOB/VILLELA and CP. 88F hx HFrEF (15-20%; 4/2023), CAD (s/p JASMYNE x1 2006, known LBBB), HTN, HLD, and mild dementia and ataxia presented to [facility] ED reporting SOB/VILLELA and CP. 88F hx HFrEF (15-20%; 4/2023), CAD (s/p JASMYNE x1 2006, known LBBB), HTN, HLD, and mild dementia and ataxia presented to [facility] ED reporting SOB/VILLELA and CP. Patient with baseline dementia and unable to provide history, therefore history elicited from granddaughter at bedside. Reportedly patient was "not feeling herself" during the morning. Proceeded to walk up the stairs and had spell of dyspnea and chest pain, but unable to clearly articulate her discomfort. Patient was holding her chest however. Patient has infrequent episodes of feeling off but has not complained of chest pain during these episodes. Granddaugher (Justa) regularly provides her her medications and she has not missed any of her cardiac meds. Per Justa, patient also has not complained of any recent fevers/chills, SOB/VILLELA, cough, abdominal pain, nausea, vomiting. She tracks her diet and weight regularly (dry = 124-126lbs, last discharge left 118 lbs). EMS was called who found her to be hypoxic w/ SpO2 85% on RA.    In the ED, patient afebrile and hemodynamically stable, saturating well on room air. Labs remarkable for SCr 1.61, Trops peaked 548, VBG pH 7.30, pCO2 60, lactate 3.1 with AG 17, AST/ALT in 50s. Imaging with CXR unremarkable, no pulm edema. 88F hx HFrEF (15-20%; 4/2023), CAD (s/p JASMYNE x1 2006, known LBBB), HTN, HLD, and mild dementia and ataxia presented to [facility] ED reporting SOB/VILLELA and CP. Patient with baseline dementia and unable to provide history, therefore history elicited from granddaughter at bedside. Reportedly patient was "not feeling herself" during the morning. Proceeded to walk up the stairs and had spell of dyspnea and chest pain, but unable to clearly articulate her discomfort. Patient was holding her chest however. Patient has infrequent episodes of feeling off but has not complained of chest pain during these episodes. Granddaugher (Justa) regularly provides her her medications and she has not missed any of her cardiac meds. Per Justa, patient also has not complained of any recent fevers/chills, SOB/VILLELA, cough, abdominal pain, nausea, vomiting. She tracks her diet and weight regularly (dry = 124-126lbs, last discharge left 118 lbs). EMS was called who found her to be hypoxic w/ SpO2 85% on RA.    In the ED, patient afebrile and hemodynamically stable, saturating well on room air. Labs remarkable for SCr 1.61, Trops peaked 548, VBG pH 7.30, pCO2 60, lactate 3.1 with AG 17, AST/ALT in 50s. Imaging with CXR unremarkable, no pulm edema. 88F hx HFrEF (15-20%; 4/2023), CAD (s/p JASMYNE x1 2006, known LBBB), HTN, HLD, and mild dementia and ataxia presented to [facility] ED reporting SOB/VILLELA and CP. Patient with baseline dementia and unable to provide history, therefore history elicited from granddaughter at bedside. Reportedly patient was "not feeling herself" during the morning. Proceeded to walk up the stairs and had spell of dyspnea and chest pain, but unable to clearly articulate her discomfort. Patient was holding her chest however. Patient has infrequent episodes of feeling off but has not complained of chest pain during these episodes. Granddaughter (Justa) regularly provides her her medications and she has not missed any of her cardiac meds. Per Justa, patient also has not complained of any recent fevers/chills, SOB/VILLELA, cough, abdominal pain, nausea, vomiting. She tracks her diet and weight regularly (dry = 124-126lbs, last discharge left 118 lbs). EMS was called who found her to be hypoxic w/ SpO2 85% on RA.    In the ED, patient afebrile and hemodynamically stable, saturating well on room air. Labs remarkable for SCr 1.61, Trops peaked 548, VBG pH 7.30, pCO2 60, lactate 3.1 with AG 17, AST/ALT in 50s. Imaging with CXR unremarkable, no pulm edema. 88F hx HFrEF (15-20%; 4/2023), CAD (s/p JASMYNE x1 2006, known LBBB), HTN, HLD, and mild dementia and ataxia presented to [facility] ED reporting SOB/VILLELA and CP. Patient with baseline dementia and unable to provide history, therefore history elicited from granddaughter at bedside. Reportedly patient was "not feeling herself" during the morning. Proceeded to walk up the stairs and had spell of dyspnea and chest pain, but unable to clearly articulate her discomfort. Patient was holding her chest however. Patient has infrequent episodes of feeling off but has not complained of chest pain during these episodes. Granddaughter (Justa) regularly provides her her medications and she has not missed any of her cardiac meds. Per Justa, patient also has not complained of any recent fevers/chills, SOB/VILLELA, cough, abdominal pain, nausea, vomiting. She tracks her diet and weight regularly (dry = 124-126lbs, last discharge left 118 lbs). EMS was called who found her to be hypoxic w/ SpO2 85% on RA.    In the ED, patient afebrile and hemodynamically stable, saturating well on room air. Labs remarkable for SCr 1.61, Trops peaked 548, VBG pH 7.30, pCO2 60, lactate 3.1 with AG 17, AST/ALT in 50s. Imaging with CXR unremarkable, no pulm edema.

## 2023-12-11 NOTE — ED PROVIDER NOTE - CLINICAL SUMMARY MEDICAL DECISION MAKING FREE TEXT BOX
88-year-old female presenting to the emergency department for episode of appearing short of breath and like she was going to pass out that occurred earlier this morning witnessed by family.  Did not actually pass out, breathing has improved since this episode.  Had been doing well up until today.  Has history of CHF CAD and hypertension for which family reports they are very adherent to administering her medications and carefully watching her diet.  Is closely followed by cardiology and her PMD.  Has not had any recent infectious symptoms.  History somewhat limited by a patient history of dementia.  On exam is nontoxic appearing, frail, unlabored breathing, O2 sat is normal, abdomen soft and nontender, extremities are warm and well-perfused with no edema.  Per EMS she was found to be hypoxic on their arrival, is stable on room air here.  Plan for labs, chest x-ray, EKG already reviewed and discussed with patient's granddaughter who is at bedside who reports the left bundle branch block is old and is known.  Monitor.  Anticipate admission pending initial workup.

## 2023-12-11 NOTE — ED PROVIDER NOTE - PROGRESS NOTE DETAILS
Soren Arteaga PGY2:  Pt vitals are unremarkable. She feels comfortable with no CP or SOB at this time. She has no complaints at this time. Spoke with Cardio who do not want to start AC at this time. They will come and eval the pt. spoke with cards, plan to treat medically for ACS with plavix, heparin. no plan for invasive measures at this time. No active chest pain. Admit to tele. Case discussed with hospitalist

## 2023-12-11 NOTE — H&P ADULT - ASSESSMENT
88F hx HFrEF (15-20%; 4/2023), CAD (s/p JASMYNE x1 2006, known LBBB), HTN, HLD, and mild dementia and ataxia presented for CP, found to have NSTEMI. Family prefers non-invasive management, so started on ASA/Plavix/Hep gtt triple therapy given no LHC warranted. Currently monitoring on Tele. 88F hx HFrEF (15-20%; 4/2023), CAD (s/p JASMYNE x1 2006, known LBBB), HTN, HLD, and mild dementia and ataxia presented for CP, found to have NSTEMI. Family prefers non-invasive management, so started on ASA/Plavix/Hep gtt triple therapy per ACS protocol, ischemic w/u with LHC not within GOC. Currently monitoring on Tele. [ x ]  Lab studies personally reviewed  [ x ]  Radiology personally reviewed  [ x ]  Old records personally reviewed    88F hx HFrEF (15-20%; 4/2023), CAD (s/p JASMYNE x1 2006, known LBBB), HTN, HLD, and mild dementia and ataxia presented for CP, found to have NSTEMI. Family prefers non-invasive management, so started on ASA/Plavix/Hep gtt triple therapy per ACS protocol, ischemic w/u with LHC not within GOC. Currently monitoring on Tele.

## 2023-12-11 NOTE — ED PROVIDER NOTE - OBJECTIVE STATEMENT
88-year-old female presenting to the emergency department with episode of shortness of breath and dyspnea on exertion.  Patient has history of dementia and per granddaughter is not really able to express specific pain however this morning, was going up the stairs and became extremely short of breath by the time she got to the top, looked like she was going to pass out.  And was holding her chest at the time.  Patient herself does not recall if she had any discomfort, reports she does not feel short of breath in the bed on evaluation.  Per granddaughter who helps care for her she has history of CHF hypertension hyperlipidemia, is on medications and regularly takes them, has history of 1 stent in the past.Cardiologist is Dr. Anand López, PMD is Dr. Jurado.  Up until today no recent fevers, chills, cough, vomiting, edema.  Patient's granddaughter carefully monitors her weight and fluid/diet intake, reports she did go away for the weekend to another family member and is unsure if she strictly adhered to it however did definitely take her medications as confirmed by family.  Also reporting that she has not had any increase in lower extremity edema, her legs have been normal-appearing.

## 2023-12-11 NOTE — H&P ADULT - PROBLEM SELECTOR PLAN 6
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

## 2023-12-11 NOTE — ED ADULT TRIAGE NOTE - NS ED NURSE AMBULANCES
[facility]

## 2023-12-12 ENCOUNTER — TRANSCRIPTION ENCOUNTER (OUTPATIENT)
Age: 88
End: 2023-12-12

## 2023-12-12 VITALS
DIASTOLIC BLOOD PRESSURE: 84 MMHG | SYSTOLIC BLOOD PRESSURE: 141 MMHG | OXYGEN SATURATION: 98 % | HEART RATE: 74 BPM | TEMPERATURE: 98 F | RESPIRATION RATE: 17 BRPM

## 2023-12-12 DIAGNOSIS — Z95.5 PRESENCE OF CORONARY ANGIOPLASTY IMPLANT AND GRAFT: Chronic | ICD-10-CM

## 2023-12-12 DIAGNOSIS — I50.9 HEART FAILURE, UNSPECIFIED: ICD-10-CM

## 2023-12-12 DIAGNOSIS — I50.23 ACUTE ON CHRONIC SYSTOLIC (CONGESTIVE) HEART FAILURE: ICD-10-CM

## 2023-12-12 DIAGNOSIS — Z71.89 OTHER SPECIFIED COUNSELING: ICD-10-CM

## 2023-12-12 LAB
24R-OH-CALCIDIOL SERPL-MCNC: 41.4 NG/ML — SIGNIFICANT CHANGE UP (ref 30–80)
24R-OH-CALCIDIOL SERPL-MCNC: 41.4 NG/ML — SIGNIFICANT CHANGE UP (ref 30–80)
A1C WITH ESTIMATED AVERAGE GLUCOSE RESULT: 5.3 % — SIGNIFICANT CHANGE UP (ref 4–5.6)
A1C WITH ESTIMATED AVERAGE GLUCOSE RESULT: 5.3 % — SIGNIFICANT CHANGE UP (ref 4–5.6)
ALBUMIN SERPL ELPH-MCNC: 4.3 G/DL — SIGNIFICANT CHANGE UP (ref 3.3–5)
ALBUMIN SERPL ELPH-MCNC: 4.3 G/DL — SIGNIFICANT CHANGE UP (ref 3.3–5)
ALP SERPL-CCNC: 64 U/L — SIGNIFICANT CHANGE UP (ref 40–120)
ALP SERPL-CCNC: 64 U/L — SIGNIFICANT CHANGE UP (ref 40–120)
ALT FLD-CCNC: 36 U/L — HIGH (ref 4–33)
ALT FLD-CCNC: 36 U/L — HIGH (ref 4–33)
ANION GAP SERPL CALC-SCNC: 15 MMOL/L — HIGH (ref 7–14)
ANION GAP SERPL CALC-SCNC: 15 MMOL/L — HIGH (ref 7–14)
APPEARANCE UR: CLEAR — SIGNIFICANT CHANGE UP
APPEARANCE UR: CLEAR — SIGNIFICANT CHANGE UP
APTT BLD: 110.5 SEC — HIGH (ref 24.5–35.6)
APTT BLD: 110.5 SEC — HIGH (ref 24.5–35.6)
APTT BLD: 56.3 SEC — HIGH (ref 24.5–35.6)
APTT BLD: 56.3 SEC — HIGH (ref 24.5–35.6)
AST SERPL-CCNC: 49 U/L — HIGH (ref 4–32)
AST SERPL-CCNC: 49 U/L — HIGH (ref 4–32)
BASOPHILS # BLD AUTO: 0.03 K/UL — SIGNIFICANT CHANGE UP (ref 0–0.2)
BASOPHILS # BLD AUTO: 0.03 K/UL — SIGNIFICANT CHANGE UP (ref 0–0.2)
BASOPHILS NFR BLD AUTO: 0.6 % — SIGNIFICANT CHANGE UP (ref 0–2)
BASOPHILS NFR BLD AUTO: 0.6 % — SIGNIFICANT CHANGE UP (ref 0–2)
BILIRUB SERPL-MCNC: 0.5 MG/DL — SIGNIFICANT CHANGE UP (ref 0.2–1.2)
BILIRUB SERPL-MCNC: 0.5 MG/DL — SIGNIFICANT CHANGE UP (ref 0.2–1.2)
BILIRUB UR-MCNC: NEGATIVE — SIGNIFICANT CHANGE UP
BILIRUB UR-MCNC: NEGATIVE — SIGNIFICANT CHANGE UP
BUN SERPL-MCNC: 27 MG/DL — HIGH (ref 7–23)
BUN SERPL-MCNC: 27 MG/DL — HIGH (ref 7–23)
CALCIUM SERPL-MCNC: 9.5 MG/DL — SIGNIFICANT CHANGE UP (ref 8.4–10.5)
CALCIUM SERPL-MCNC: 9.5 MG/DL — SIGNIFICANT CHANGE UP (ref 8.4–10.5)
CHLORIDE SERPL-SCNC: 101 MMOL/L — SIGNIFICANT CHANGE UP (ref 98–107)
CHLORIDE SERPL-SCNC: 101 MMOL/L — SIGNIFICANT CHANGE UP (ref 98–107)
CHOLEST SERPL-MCNC: 170 MG/DL — SIGNIFICANT CHANGE UP
CHOLEST SERPL-MCNC: 170 MG/DL — SIGNIFICANT CHANGE UP
CK MB BLD-MCNC: 2.7 % — HIGH (ref 0–2.5)
CK MB BLD-MCNC: 2.7 % — HIGH (ref 0–2.5)
CK MB CFR SERPL CALC: 6.4 NG/ML — HIGH
CK MB CFR SERPL CALC: 6.4 NG/ML — HIGH
CK SERPL-CCNC: 235 U/L — HIGH (ref 25–170)
CK SERPL-CCNC: 235 U/L — HIGH (ref 25–170)
CO2 SERPL-SCNC: 24 MMOL/L — SIGNIFICANT CHANGE UP (ref 22–31)
CO2 SERPL-SCNC: 24 MMOL/L — SIGNIFICANT CHANGE UP (ref 22–31)
COLOR SPEC: YELLOW — SIGNIFICANT CHANGE UP
COLOR SPEC: YELLOW — SIGNIFICANT CHANGE UP
CREAT SERPL-MCNC: 1.44 MG/DL — HIGH (ref 0.5–1.3)
CREAT SERPL-MCNC: 1.44 MG/DL — HIGH (ref 0.5–1.3)
DIFF PNL FLD: NEGATIVE — SIGNIFICANT CHANGE UP
DIFF PNL FLD: NEGATIVE — SIGNIFICANT CHANGE UP
EGFR: 35 ML/MIN/1.73M2 — LOW
EGFR: 35 ML/MIN/1.73M2 — LOW
EOSINOPHIL # BLD AUTO: 0.05 K/UL — SIGNIFICANT CHANGE UP (ref 0–0.5)
EOSINOPHIL # BLD AUTO: 0.05 K/UL — SIGNIFICANT CHANGE UP (ref 0–0.5)
EOSINOPHIL NFR BLD AUTO: 1 % — SIGNIFICANT CHANGE UP (ref 0–6)
EOSINOPHIL NFR BLD AUTO: 1 % — SIGNIFICANT CHANGE UP (ref 0–6)
ESTIMATED AVERAGE GLUCOSE: 105 — SIGNIFICANT CHANGE UP
ESTIMATED AVERAGE GLUCOSE: 105 — SIGNIFICANT CHANGE UP
GAS PNL BLDV: SIGNIFICANT CHANGE UP
GAS PNL BLDV: SIGNIFICANT CHANGE UP
GLUCOSE SERPL-MCNC: 103 MG/DL — HIGH (ref 70–99)
GLUCOSE SERPL-MCNC: 103 MG/DL — HIGH (ref 70–99)
GLUCOSE UR QL: >=1000 MG/DL
GLUCOSE UR QL: >=1000 MG/DL
HCT VFR BLD CALC: 41.5 % — SIGNIFICANT CHANGE UP (ref 34.5–45)
HCT VFR BLD CALC: 41.5 % — SIGNIFICANT CHANGE UP (ref 34.5–45)
HDLC SERPL-MCNC: 66 MG/DL — SIGNIFICANT CHANGE UP
HDLC SERPL-MCNC: 66 MG/DL — SIGNIFICANT CHANGE UP
HGB BLD-MCNC: 13 G/DL — SIGNIFICANT CHANGE UP (ref 11.5–15.5)
HGB BLD-MCNC: 13 G/DL — SIGNIFICANT CHANGE UP (ref 11.5–15.5)
IANC: 2.44 K/UL — SIGNIFICANT CHANGE UP (ref 1.8–7.4)
IANC: 2.44 K/UL — SIGNIFICANT CHANGE UP (ref 1.8–7.4)
IMM GRANULOCYTES NFR BLD AUTO: 0.2 % — SIGNIFICANT CHANGE UP (ref 0–0.9)
IMM GRANULOCYTES NFR BLD AUTO: 0.2 % — SIGNIFICANT CHANGE UP (ref 0–0.9)
KETONES UR-MCNC: NEGATIVE MG/DL — SIGNIFICANT CHANGE UP
KETONES UR-MCNC: NEGATIVE MG/DL — SIGNIFICANT CHANGE UP
LACTATE SERPL-SCNC: 2.2 MMOL/L — HIGH (ref 0.5–2)
LACTATE SERPL-SCNC: 2.2 MMOL/L — HIGH (ref 0.5–2)
LEUKOCYTE ESTERASE UR-ACNC: NEGATIVE — SIGNIFICANT CHANGE UP
LEUKOCYTE ESTERASE UR-ACNC: NEGATIVE — SIGNIFICANT CHANGE UP
LIPID PNL WITH DIRECT LDL SERPL: 94 MG/DL — SIGNIFICANT CHANGE UP
LIPID PNL WITH DIRECT LDL SERPL: 94 MG/DL — SIGNIFICANT CHANGE UP
LYMPHOCYTES # BLD AUTO: 1.97 K/UL — SIGNIFICANT CHANGE UP (ref 1–3.3)
LYMPHOCYTES # BLD AUTO: 1.97 K/UL — SIGNIFICANT CHANGE UP (ref 1–3.3)
LYMPHOCYTES # BLD AUTO: 40 % — SIGNIFICANT CHANGE UP (ref 13–44)
LYMPHOCYTES # BLD AUTO: 40 % — SIGNIFICANT CHANGE UP (ref 13–44)
MCHC RBC-ENTMCNC: 26.3 PG — LOW (ref 27–34)
MCHC RBC-ENTMCNC: 26.3 PG — LOW (ref 27–34)
MCHC RBC-ENTMCNC: 31.3 GM/DL — LOW (ref 32–36)
MCHC RBC-ENTMCNC: 31.3 GM/DL — LOW (ref 32–36)
MCV RBC AUTO: 84 FL — SIGNIFICANT CHANGE UP (ref 80–100)
MCV RBC AUTO: 84 FL — SIGNIFICANT CHANGE UP (ref 80–100)
MONOCYTES # BLD AUTO: 0.42 K/UL — SIGNIFICANT CHANGE UP (ref 0–0.9)
MONOCYTES # BLD AUTO: 0.42 K/UL — SIGNIFICANT CHANGE UP (ref 0–0.9)
MONOCYTES NFR BLD AUTO: 8.5 % — SIGNIFICANT CHANGE UP (ref 2–14)
MONOCYTES NFR BLD AUTO: 8.5 % — SIGNIFICANT CHANGE UP (ref 2–14)
NEUTROPHILS # BLD AUTO: 2.44 K/UL — SIGNIFICANT CHANGE UP (ref 1.8–7.4)
NEUTROPHILS # BLD AUTO: 2.44 K/UL — SIGNIFICANT CHANGE UP (ref 1.8–7.4)
NEUTROPHILS NFR BLD AUTO: 49.7 % — SIGNIFICANT CHANGE UP (ref 43–77)
NEUTROPHILS NFR BLD AUTO: 49.7 % — SIGNIFICANT CHANGE UP (ref 43–77)
NITRITE UR-MCNC: NEGATIVE — SIGNIFICANT CHANGE UP
NITRITE UR-MCNC: NEGATIVE — SIGNIFICANT CHANGE UP
NON HDL CHOLESTEROL: 104 MG/DL — SIGNIFICANT CHANGE UP
NON HDL CHOLESTEROL: 104 MG/DL — SIGNIFICANT CHANGE UP
NRBC # BLD: 0 /100 WBCS — SIGNIFICANT CHANGE UP (ref 0–0)
NRBC # BLD: 0 /100 WBCS — SIGNIFICANT CHANGE UP (ref 0–0)
NRBC # FLD: 0 K/UL — SIGNIFICANT CHANGE UP (ref 0–0)
NRBC # FLD: 0 K/UL — SIGNIFICANT CHANGE UP (ref 0–0)
NT-PROBNP SERPL-SCNC: 9087 PG/ML — HIGH
NT-PROBNP SERPL-SCNC: 9087 PG/ML — HIGH
PH UR: 6 — SIGNIFICANT CHANGE UP (ref 5–8)
PH UR: 6 — SIGNIFICANT CHANGE UP (ref 5–8)
PLATELET # BLD AUTO: 199 K/UL — SIGNIFICANT CHANGE UP (ref 150–400)
PLATELET # BLD AUTO: 199 K/UL — SIGNIFICANT CHANGE UP (ref 150–400)
POTASSIUM SERPL-MCNC: 4.7 MMOL/L — SIGNIFICANT CHANGE UP (ref 3.5–5.3)
POTASSIUM SERPL-MCNC: 4.7 MMOL/L — SIGNIFICANT CHANGE UP (ref 3.5–5.3)
POTASSIUM SERPL-SCNC: 4.7 MMOL/L — SIGNIFICANT CHANGE UP (ref 3.5–5.3)
POTASSIUM SERPL-SCNC: 4.7 MMOL/L — SIGNIFICANT CHANGE UP (ref 3.5–5.3)
PROT SERPL-MCNC: 7.8 G/DL — SIGNIFICANT CHANGE UP (ref 6–8.3)
PROT SERPL-MCNC: 7.8 G/DL — SIGNIFICANT CHANGE UP (ref 6–8.3)
PROT UR-MCNC: SIGNIFICANT CHANGE UP MG/DL
PROT UR-MCNC: SIGNIFICANT CHANGE UP MG/DL
RBC # BLD: 4.94 M/UL — SIGNIFICANT CHANGE UP (ref 3.8–5.2)
RBC # BLD: 4.94 M/UL — SIGNIFICANT CHANGE UP (ref 3.8–5.2)
RBC # FLD: 12.8 % — SIGNIFICANT CHANGE UP (ref 10.3–14.5)
RBC # FLD: 12.8 % — SIGNIFICANT CHANGE UP (ref 10.3–14.5)
SODIUM SERPL-SCNC: 140 MMOL/L — SIGNIFICANT CHANGE UP (ref 135–145)
SODIUM SERPL-SCNC: 140 MMOL/L — SIGNIFICANT CHANGE UP (ref 135–145)
SP GR SPEC: 1.02 — SIGNIFICANT CHANGE UP (ref 1–1.03)
SP GR SPEC: 1.02 — SIGNIFICANT CHANGE UP (ref 1–1.03)
TRIGL SERPL-MCNC: 52 MG/DL — SIGNIFICANT CHANGE UP
TRIGL SERPL-MCNC: 52 MG/DL — SIGNIFICANT CHANGE UP
TROPONIN T, HIGH SENSITIVITY RESULT: 317 NG/L — CRITICAL HIGH
TROPONIN T, HIGH SENSITIVITY RESULT: 317 NG/L — CRITICAL HIGH
UROBILINOGEN FLD QL: 0.2 MG/DL — SIGNIFICANT CHANGE UP (ref 0.2–1)
UROBILINOGEN FLD QL: 0.2 MG/DL — SIGNIFICANT CHANGE UP (ref 0.2–1)
WBC # BLD: 4.92 K/UL — SIGNIFICANT CHANGE UP (ref 3.8–10.5)
WBC # BLD: 4.92 K/UL — SIGNIFICANT CHANGE UP (ref 3.8–10.5)
WBC # FLD AUTO: 4.92 K/UL — SIGNIFICANT CHANGE UP (ref 3.8–10.5)
WBC # FLD AUTO: 4.92 K/UL — SIGNIFICANT CHANGE UP (ref 3.8–10.5)

## 2023-12-12 PROCEDURE — 99497 ADVNCD CARE PLAN 30 MIN: CPT | Mod: GC,25

## 2023-12-12 PROCEDURE — 99223 1ST HOSP IP/OBS HIGH 75: CPT | Mod: GC

## 2023-12-12 PROCEDURE — 99223 1ST HOSP IP/OBS HIGH 75: CPT

## 2023-12-12 PROCEDURE — 93306 TTE W/DOPPLER COMPLETE: CPT | Mod: 26

## 2023-12-12 RX ORDER — PANTOPRAZOLE SODIUM 20 MG/1
40 TABLET, DELAYED RELEASE ORAL
Refills: 0 | Status: DISCONTINUED | OUTPATIENT
Start: 2023-12-12 | End: 2023-12-12

## 2023-12-12 RX ORDER — SACUBITRIL AND VALSARTAN 24; 26 MG/1; MG/1
1 TABLET, FILM COATED ORAL
Qty: 0 | Refills: 0 | DISCHARGE
Start: 2023-12-12

## 2023-12-12 RX ORDER — FUROSEMIDE 40 MG
40 TABLET ORAL DAILY
Refills: 0 | Status: DISCONTINUED | OUTPATIENT
Start: 2023-12-12 | End: 2023-12-12

## 2023-12-12 RX ORDER — PANTOPRAZOLE SODIUM 20 MG/1
1 TABLET, DELAYED RELEASE ORAL
Qty: 30 | Refills: 0
Start: 2023-12-12 | End: 2024-01-10

## 2023-12-12 RX ORDER — CLOPIDOGREL BISULFATE 75 MG/1
1 TABLET, FILM COATED ORAL
Qty: 30 | Refills: 0
Start: 2023-12-12 | End: 2024-01-10

## 2023-12-12 RX ORDER — DAPAGLIFLOZIN 10 MG/1
10 TABLET, FILM COATED ORAL DAILY
Refills: 0 | Status: DISCONTINUED | OUTPATIENT
Start: 2023-12-12 | End: 2023-12-12

## 2023-12-12 RX ORDER — SACUBITRIL AND VALSARTAN 24; 26 MG/1; MG/1
1 TABLET, FILM COATED ORAL
Refills: 0 | Status: DISCONTINUED | OUTPATIENT
Start: 2023-12-12 | End: 2023-12-12

## 2023-12-12 RX ORDER — FUROSEMIDE 40 MG
40 TABLET ORAL DAILY
Refills: 0 | Status: DISCONTINUED | OUTPATIENT
Start: 2023-12-13 | End: 2023-12-12

## 2023-12-12 RX ADMIN — SACUBITRIL AND VALSARTAN 1 TABLET(S): 24; 26 TABLET, FILM COATED ORAL at 02:37

## 2023-12-12 RX ADMIN — Medication 81 MILLIGRAM(S): at 13:20

## 2023-12-12 RX ADMIN — DAPAGLIFLOZIN 10 MILLIGRAM(S): 10 TABLET, FILM COATED ORAL at 14:36

## 2023-12-12 RX ADMIN — SACUBITRIL AND VALSARTAN 1 TABLET(S): 24; 26 TABLET, FILM COATED ORAL at 18:25

## 2023-12-12 RX ADMIN — Medication 40 MILLIGRAM(S): at 07:20

## 2023-12-12 RX ADMIN — CLOPIDOGREL BISULFATE 75 MILLIGRAM(S): 75 TABLET, FILM COATED ORAL at 13:20

## 2023-12-12 RX ADMIN — MIRTAZAPINE 7.5 MILLIGRAM(S): 45 TABLET, ORALLY DISINTEGRATING ORAL at 13:22

## 2023-12-12 RX ADMIN — HEPARIN SODIUM 0 UNIT(S)/HR: 5000 INJECTION INTRAVENOUS; SUBCUTANEOUS at 07:55

## 2023-12-12 RX ADMIN — HEPARIN SODIUM 700 UNIT(S)/HR: 5000 INJECTION INTRAVENOUS; SUBCUTANEOUS at 02:00

## 2023-12-12 RX ADMIN — HEPARIN SODIUM 500 UNIT(S)/HR: 5000 INJECTION INTRAVENOUS; SUBCUTANEOUS at 09:04

## 2023-12-12 NOTE — DISCHARGE NOTE NURSING/CASE MANAGEMENT/SOCIAL WORK - PATIENT PORTAL LINK FT
You can access the FollowMyHealth Patient Portal offered by [facility] by registering at the following website: [email or web address]. By joining [employer]’s FollowMyHealth portal, you will also be able to view your health information using other applications (apps) compatible with our system. You can access the FollowMyHealth Patient Portal offered by [facility] by registering at the following website: [email or web address]. By joining [employer]’s FollowMyHealth portal, you will also be able to view your health information using other applications (apps) compatible with our system.

## 2023-12-12 NOTE — DISCHARGE NOTE PROVIDER - NSDCFUSCHEDAPPT_GEN_ALL_CORE_FT
[facility]  GERIATRICS [address]   Scheduled Appointment: 02/05/2024    Anand López  [facility]  CARDIOLOGY [address] [facility]   Scheduled Appointment: 02/20/2024     [facility]  GERIATRICS [address]   Scheduled Appointment: 02/05/2024    Anand López  [facility]  CARDIOLOGY [address] [facility]   Scheduled Appointment: 02/20/2024     EJRED PAYNE  [facility]  CARDIOLOGY [address] [facility]   Scheduled Appointment: 12/18/2023    [facility]  GERIATRICS [address]   Scheduled Appointment: 02/05/2024    Anand López  [facility]  CARDIOLOGY [address] [facility]   Scheduled Appointment: 02/20/2024     JERED PAYNE  [facility]  CARDIOLOGY [address] [facility]   Scheduled Appointment: 12/18/2023    [facility]  GERIATRICS [address]   Scheduled Appointment: 02/05/2024    Anand López  [facility]  CARDIOLOGY [address] [facility]   Scheduled Appointment: 02/20/2024

## 2023-12-12 NOTE — DISCHARGE NOTE PROVIDER - NSDCCPCAREPLAN_GEN_ALL_CORE_FT
PRINCIPAL DISCHARGE DIAGNOSIS  Diagnosis: Chest pain  Assessment and Plan of Treatment: You came to the hospital with chest pain. Cardiology team saw you and started you on plavix. You are also started on pantoprazole for prophylaxis. You opted for medical management. Echocardiogram shows slight improvement in your systolic function compared to your prior echocardiogram. Cardiology team has cleared you go home and can followup outpatient.

## 2023-12-12 NOTE — DISCHARGE NOTE NURSING/CASE MANAGEMENT/SOCIAL WORK - NSDCFUADDAPPT_GEN_ALL_CORE_FT
APPTS ARE READY TO BE MADE: [X] YES    Best Family or Patient Contact (if needed): Granddaughter Justa: [phone]    Additional Information about above appointments (if needed):    1: Please make an appointment with cardiology within two weeks after discharge  2:   3:     Other comments or requests:    APPTS ARE READY TO BE MADE: [X] YES    Best Family or Patient Contact (if needed): Granddaughter Justa: [phone]    Additional Information about above appointments (if needed):    1: Please make an appointment with cardiology within two weeks after discharge  2:   3:     Other comments or requests:

## 2023-12-12 NOTE — DISCHARGE NOTE PROVIDER - CARE PROVIDERS DIRECT ADDRESSES
[email or web address]

## 2023-12-12 NOTE — CHART NOTE - NSCHARTNOTEFT_GEN_A_CORE
TTE resulted that appears relatively stable. Given pt remains asx, stable on RA and not volume overloaded, and ultimate plan is to continue w/ medical tx w/o intervention, pt is stable for DC at this point.    DC Cardiology Recs:  - continue pantoprazole 40mg while on DAPT  - continue ASA 81mg and plavix 75mg for 1 year, than return to monotherapy  - continue home entresto 24-26  - continue home furosemide 40mg  - continue home metop 25  - continue atorvastatin  - can resume home dapagliflozin  - stable for cardiology perspective for DC  - please ensure pt has cardiology f/up within 2w of DC
Patient is seen and examined at bedside. Reports feeling fine. No chest pain, sob. Feels overwhelmed that she has so many stickers on her chest. Explained to patient that that's cardiac monitoring.     VITALS: T(C): 36.3 (12-12-23 @ 13:45), Max: 36.6 (12-12-23 @ 07:17)  HR: 69 (12-12-23 @ 16:20) (61 - 76)  BP: 113/62 (12-12-23 @ 16:20) (96/63 - 126/73)  RR: 18 (12-12-23 @ 16:20) (18 - 18)  SpO2: 96% (12-12-23 @ 16:20) (96% - 100%)  GENERAL: NAD, alert, resting comfortably  HEAD:  Atraumatic, Normocephalic  EYES: conjunctiva and sclera clear  NECK: Supple, No JVD  CHEST/LUNG: Clear to auscultation bilaterally; No wheeze, ronchi or rales  HEART: Regular rate and rhythm; No murmurs, rubs, or gallops  ABDOMEN: Soft, Nontender, Nondistended; Bowel sounds present  EXTREMITIES:  2+ Peripheral Pulses, No clubbing, cyanosis, or edema  PSYCH: calm, cooperative  NEUROLOGY: moving all extremities  SKIN: No rashes or lesions    plan:  -discussed with cardiology. Per card, ok to stop heparin gtt. Continue plavix and asa and ppi for a year, c/w home cardiac meds, TTE reviewed, ok to dc home w/ outpatient f/u per card  -discussed with family regarding PT recommending rehab. Family wants to take patient home tonight instead. Does not want to wait for home care or home PT arranged.    Patient medically stable to dc. Meds discussed with granddaughter. Dc planning 32 mins.

## 2023-12-12 NOTE — PHYSICAL THERAPY INITIAL EVALUATION ADULT - PLANNED THERAPY INTERVENTIONS, PT EVAL
Patient left positioned for safety on stretcher in ED; +bilateral side rails; in NAD; all lines intact; family at bedside; TESSIE Way aware./balance training/bed mobility training/gait training/strengthening/transfer training

## 2023-12-12 NOTE — DISCHARGE NOTE PROVIDER - CARE PROVIDER_API CALL
Anand López  Cardiology  [address] [facility], [facility] [address]  [place] [address]  Phone: [phone]  Fax: [phone]  Follow Up Time: 2 weeks   Anand López  Cardiology  [address] [facility], [facility] [address]  [place] [address]  Phone: [phone]  Fax: [phone]  Follow Up Time: 2 weeks

## 2023-12-12 NOTE — PHYSICAL THERAPY INITIAL EVALUATION ADULT - NSPTDISCHREC_GEN_A_CORE
Pt is pending stair assessment. Anticipate Restorative Rehab to improve functional mobility and strength and to return to baseline functional status. Please follow PT progress notes for updates.

## 2023-12-12 NOTE — PROGRESS NOTE ADULT - SUBJECTIVE AND OBJECTIVE BOX
Cardiology Progress Note  ------------------------------------------------------------------------------------------    SUBJECTIVE/EVENTS:  - NAEO    -------------------------------------------------------------------------------------------  ROS:  CV: chest pain (-), palpitation (-), orthopnea (-), PND (-), edema (-)  PULM: SOB (-), cough (-), wheezing (-), hemoptysis (-).   CONST: fever (-), chills (-) or fatigue (-)  GI: abdominal distension (-), abdominal pain (-) , nausea/vomiting (-), hematemesis, (-), melena (-), hematochezia (-)  : dysuria (-), frequency (-), hematuria (-).   NEURO: numbness (-), weakness (-), dizziness (-)  MSK: myalgia (-), joint pain (-)   SKIN: itching (-), rash (-)  HEENT:  visual changes (-); vertigo or throat pain (-);  neck stiffness (-)   Psych: change in mood (-), anxiety (-), depression (-)     All other review of systems is negative unless indicated above.   -------------------------------------------------------------------------------------------  VITALS:  T(F): 97.8 (12-12), Max: 97.8 (12-11)  HR: 68 (-12) (61 - 98)  BP: 126/73 (12-12) (96/63 - 132/82)  RR: 18 (-12)  SpO2: 100% ()  I&O's Summary    ------------------------------------------------------------------------------------------  PHYSICAL EXAM:  GEN: NAD, sitting in bed  HEENT: NCAT, EOMI  CV: RRR, Ns1/s2, no m/r/g, JVP not elevated  RESP: CTA B/l, no w/r/r  ABD: soft, nt, nd  EXT: warm, 2+ pulses, no edema  SKIN: no visible lesions, rashes  NEURO: AAOx1, no focal deficits  PSYCH: Calm, cooperative  -------------------------------------------------------------------------------------------  LABS:                          13.0   4.92  )-----------( 199      ( 12 Dec 2023 05:02 )             41.5     1212    140  |  101  |  27<H>  ----------------------------<  103<H>  4.7   |  24  |  1.44<H>    Ca    9.5      12 Dec 2023 05:02    TPro  7.8  /  Alb  4.3  /  TBili  0.5  /  DBili  x   /  AST  49<H>  /  ALT  36<H>  /  AlkPhos  64  12    PT/INR - ( 11 Dec 2023 20:35 )   PT: 11.0 sec;   INR: 0.97 ratio         PTT - ( 12 Dec 2023 06:10 )  PTT:110.5 sec  CARDIAC MARKERS ( 12 Dec 2023 05:02 )  317 ng/L / x     / x     / 235 U/L / x     / 6.4 ng/mL  CARDIAC MARKERS ( 11 Dec 2023 18:33 )  540 ng/L / x     / x     / 210 U/L / x     / 8.2 ng/mL  CARDIAC MARKERS ( 11 Dec 2023 15:40 )  548 ng/L / x     / x     / x     / x     / x      CARDIAC MARKERS ( 11 Dec 2023 12:38 )  421 ng/L / x     / x     / x     / x     / x          Total Cholesterol: 170  LDL: --  HDL: 66  T        -------------------------------------------------------------------------------------------  Meds:  acetaminophen     Tablet .. 650 milliGRAM(s) Oral every 6 hours PRN  aspirin  chewable 81 milliGRAM(s) Oral daily  atorvastatin 20 milliGRAM(s) Oral at bedtime  clopidogrel Tablet 75 milliGRAM(s) Oral daily  furosemide   Injectable 40 milliGRAM(s) IV Push daily  heparin   Injectable 3500 Unit(s) IV Push every 6 hours PRN  heparin  Infusion.  Unit(s)/Hr IV Continuous <Continuous>  metoprolol succinate ER 25 milliGRAM(s) Oral daily  mirtazapine 7.5 milliGRAM(s) Oral daily  sacubitril 24 mG/valsartan 26 mG 1 Tablet(s) Oral two times a day    -------------------------------------------------------------------------------------------  Cardiovascular Diagnostic Testing:      -------------------------------------------------------------------------------------------  Assessment and Plan:   Ms. Maldonado is an 88 yo woman w/ hx of HFrEF (15-20%), CAD w/ 1DES in , known LBBB, HTN, HLD mild dementia and ataxia who presented to the ED w/ SOB found to have trop elevation.    #Trop elevation  #HFrEF  #CAD  #HTN  #LBBB  Story is more consistent w/ ischemic type sx and less c/w HF. Pt currently also appears euvolemic and w/o signs of gross overload on exam. Trop elevated 400s > 500s, and EKG is showing LBBB not meeting sgarbosa's criteria. Overall there is c/f ischemia given sx of intermittent CP y/d and then exertional CP this morning w/ the associated trop elevation. Other causes of trop elevation, including HTN, volume overload, anemia, sepsis are not found here and are less likely. Discussed plan w/ granddaughter who thinks pt would not want invasive procedures so will opt for medical management.    Recommendations:  - continue heparin gtt for ACS  - continue ASA 81mg and plavix 75mg  - please get a formal TTE  - continue home entresto 24-26  - continue home furosemide 40mg  - continue home metop 25  - continue atorvastatin  - can hold home dapagliflozin until DC  - no invasive ischemic eval planned given pt and family's desires for less aggressive measures    Note is incomplete    *** Recommendations are preliminary until cosigned by the attending.    Rahul De La Cruz MD  Cardiology Fellow    For all New Consults and Questions:  [email or web address]   Login: [facility]iology Progress Note  ------------------------------------------------------------------------------------------    SUBJECTIVE/EVENTS:  - NAEO    -------------------------------------------------------------------------------------------  ROS:  CV: chest pain (-), palpitation (-), orthopnea (-), PND (-), edema (-)  PULM: SOB (-), cough (-), wheezing (-), hemoptysis (-).   CONST: fever (-), chills (-) or fatigue (-)  GI: abdominal distension (-), abdominal pain (-) , nausea/vomiting (-), hematemesis, (-), melena (-), hematochezia (-)  : dysuria (-), frequency (-), hematuria (-).   NEURO: numbness (-), weakness (-), dizziness (-)  MSK: myalgia (-), joint pain (-)   SKIN: itching (-), rash (-)  HEENT:  visual changes (-); vertigo or throat pain (-);  neck stiffness (-)   Psych: change in mood (-), anxiety (-), depression (-)     All other review of systems is negative unless indicated above.   -------------------------------------------------------------------------------------------  VITALS:  T(F): 97.8 (12-12), Max: 97.8 (12-11)  HR: 68 (-12) (61 - 98)  BP: 126/73 (12-12) (96/63 - 132/82)  RR: 18 (-12)  SpO2: 100% ()  I&O's Summary    ------------------------------------------------------------------------------------------  PHYSICAL EXAM:  GEN: NAD, sitting in bed  HEENT: NCAT, EOMI  CV: RRR, Ns1/s2, no m/r/g, JVP not elevated  RESP: CTA B/l, no w/r/r  ABD: soft, nt, nd  EXT: warm, 2+ pulses, no edema  SKIN: no visible lesions, rashes  NEURO: AAOx1, no focal deficits  PSYCH: Calm, cooperative  -------------------------------------------------------------------------------------------  LABS:                          13.0   4.92  )-----------( 199      ( 12 Dec 2023 05:02 )             41.5     1212    140  |  101  |  27<H>  ----------------------------<  103<H>  4.7   |  24  |  1.44<H>    Ca    9.5      12 Dec 2023 05:02    TPro  7.8  /  Alb  4.3  /  TBili  0.5  /  DBili  x   /  AST  49<H>  /  ALT  36<H>  /  AlkPhos  64  12    PT/INR - ( 11 Dec 2023 20:35 )   PT: 11.0 sec;   INR: 0.97 ratio         PTT - ( 12 Dec 2023 06:10 )  PTT:110.5 sec  CARDIAC MARKERS ( 12 Dec 2023 05:02 )  317 ng/L / x     / x     / 235 U/L / x     / 6.4 ng/mL  CARDIAC MARKERS ( 11 Dec 2023 18:33 )  540 ng/L / x     / x     / 210 U/L / x     / 8.2 ng/mL  CARDIAC MARKERS ( 11 Dec 2023 15:40 )  548 ng/L / x     / x     / x     / x     / x      CARDIAC MARKERS ( 11 Dec 2023 12:38 )  421 ng/L / x     / x     / x     / x     / x          Total Cholesterol: 170  LDL: --  HDL: 66  T        -------------------------------------------------------------------------------------------  Meds:  acetaminophen     Tablet .. 650 milliGRAM(s) Oral every 6 hours PRN  aspirin  chewable 81 milliGRAM(s) Oral daily  atorvastatin 20 milliGRAM(s) Oral at bedtime  clopidogrel Tablet 75 milliGRAM(s) Oral daily  furosemide   Injectable 40 milliGRAM(s) IV Push daily  heparin   Injectable 3500 Unit(s) IV Push every 6 hours PRN  heparin  Infusion.  Unit(s)/Hr IV Continuous <Continuous>  metoprolol succinate ER 25 milliGRAM(s) Oral daily  mirtazapine 7.5 milliGRAM(s) Oral daily  sacubitril 24 mG/valsartan 26 mG 1 Tablet(s) Oral two times a day    -------------------------------------------------------------------------------------------  Cardiovascular Diagnostic Testing:      -------------------------------------------------------------------------------------------  Assessment and Plan:   Ms. Maldonado is an 88 yo woman w/ hx of HFrEF (15-20%), CAD w/ 1DES in , known LBBB, HTN, HLD mild dementia and ataxia who presented to the ED w/ SOB found to have trop elevation.    #Trop elevation  #HFrEF  #CAD  #HTN  #LBBB  Story is more consistent w/ ischemic type sx and less c/w HF. Pt currently also appears euvolemic and w/o signs of gross overload on exam. Trop elevated 400s > 500s, and EKG is showing LBBB not meeting sgarbosa's criteria. Overall there is c/f ischemia given sx of intermittent CP y/d and then exertional CP this morning w/ the associated trop elevation. Other causes of trop elevation, including HTN, volume overload, anemia, sepsis are not found here and are less likely. Discussed plan w/ granddaughter who thinks pt would not want invasive procedures so will opt for medical management.    Recommendations:  - continue heparin gtt for ACS  - continue ASA 81mg and plavix 75mg  - please get a formal TTE  - continue home entresto 24-26  - continue home furosemide 40mg  - continue home metop 25  - continue atorvastatin  - can hold home dapagliflozin until DC  - no invasive ischemic eval planned given pt and family's desires for less aggressive measures    Note is incomplete    *** Recommendations are preliminary until cosigned by the attending.    Rahul De La Cruz MD  Cardiology Fellow    For all New Consults and Questions:  [email or web address]   Login: [facility] Cardiology Progress Note  ------------------------------------------------------------------------------------------    SUBJECTIVE/EVENTS:  - NAEO    -------------------------------------------------------------------------------------------  ROS:  CV: chest pain (-), palpitation (-), orthopnea (-), PND (-), edema (-)  PULM: SOB (-), cough (-), wheezing (-), hemoptysis (-).   CONST: fever (-), chills (-) or fatigue (-)  GI: abdominal distension (-), abdominal pain (-) , nausea/vomiting (-), hematemesis, (-), melena (-), hematochezia (-)  : dysuria (-), frequency (-), hematuria (-).   NEURO: numbness (-), weakness (-), dizziness (-)  MSK: myalgia (-), joint pain (-)   SKIN: itching (-), rash (-)  HEENT:  visual changes (-); vertigo or throat pain (-);  neck stiffness (-)   Psych: change in mood (-), anxiety (-), depression (-)     All other review of systems is negative unless indicated above.   -------------------------------------------------------------------------------------------  VITALS:  T(F): 97.8 (12-12), Max: 97.8 (12-11)  HR: 68 (-12) (61 - 98)  BP: 126/73 (12-12) (96/63 - 132/82)  RR: 18 (-12)  SpO2: 100% ()  I&O's Summary    ------------------------------------------------------------------------------------------  PHYSICAL EXAM:  GEN: NAD, sitting in bed  HEENT: NCAT, EOMI  CV: RRR, Ns1/s2, no m/r/g, JVP not elevated  RESP: CTA B/l, no w/r/r  ABD: soft, nt, nd  EXT: warm, 2+ pulses, no edema  SKIN: no visible lesions, rashes  NEURO: AAOx1, no focal deficits  PSYCH: Calm, cooperative  -------------------------------------------------------------------------------------------  LABS:                          13.0   4.92  )-----------( 199      ( 12 Dec 2023 05:02 )             41.5     1212    140  |  101  |  27<H>  ----------------------------<  103<H>  4.7   |  24  |  1.44<H>    Ca    9.5      12 Dec 2023 05:02    TPro  7.8  /  Alb  4.3  /  TBili  0.5  /  DBili  x   /  AST  49<H>  /  ALT  36<H>  /  AlkPhos  64  12    PT/INR - ( 11 Dec 2023 20:35 )   PT: 11.0 sec;   INR: 0.97 ratio         PTT - ( 12 Dec 2023 06:10 )  PTT:110.5 sec  CARDIAC MARKERS ( 12 Dec 2023 05:02 )  317 ng/L / x     / x     / 235 U/L / x     / 6.4 ng/mL  CARDIAC MARKERS ( 11 Dec 2023 18:33 )  540 ng/L / x     / x     / 210 U/L / x     / 8.2 ng/mL  CARDIAC MARKERS ( 11 Dec 2023 15:40 )  548 ng/L / x     / x     / x     / x     / x      CARDIAC MARKERS ( 11 Dec 2023 12:38 )  421 ng/L / x     / x     / x     / x     / x          Total Cholesterol: 170  LDL: --  HDL: 66  T        -------------------------------------------------------------------------------------------  Meds:  acetaminophen     Tablet .. 650 milliGRAM(s) Oral every 6 hours PRN  aspirin  chewable 81 milliGRAM(s) Oral daily  atorvastatin 20 milliGRAM(s) Oral at bedtime  clopidogrel Tablet 75 milliGRAM(s) Oral daily  furosemide   Injectable 40 milliGRAM(s) IV Push daily  heparin   Injectable 3500 Unit(s) IV Push every 6 hours PRN  heparin  Infusion.  Unit(s)/Hr IV Continuous <Continuous>  metoprolol succinate ER 25 milliGRAM(s) Oral daily  mirtazapine 7.5 milliGRAM(s) Oral daily  sacubitril 24 mG/valsartan 26 mG 1 Tablet(s) Oral two times a day    -------------------------------------------------------------------------------------------  Cardiovascular Diagnostic Testing:      -------------------------------------------------------------------------------------------  Assessment and Plan:   Ms. Maldonado is an 88 yo woman w/ hx of HFrEF (15-20%), CAD w/ 1DES in , known LBBB, HTN, HLD mild dementia and ataxia who presented to the ED w/ SOB found to have trop elevation.    #Trop elevation  #HFrEF  #CAD  #HTN  #LBBB  Story is more consistent w/ ischemic type sx and less c/w HF. Pt currently also appears euvolemic and w/o signs of gross overload on exam. Trop elevated 400s > 500s, and EKG is showing LBBB not meeting sgarbosa's criteria. Overall there is c/f ischemia given sx of intermittent CP y/d and then exertional CP this morning w/ the associated trop elevation. Other causes of trop elevation, including HTN, volume overload, anemia, sepsis are not found here and are less likely. Discussed plan w/ granddaughter who thinks pt would not want invasive procedures so will opt for medical management.    Recommendations:  - continue heparin gtt for ACS  - continue ASA 81mg and plavix 75mg for 1 year, than return to monotherapy  - continue home entresto 24-26  - continue home furosemide 40mg  - continue home metop 25  - continue atorvastatin  - can resume home dapagliflozin  - okay to get r/p TTE while here, if no major changes can DC home w/ cardiology f/up  - no invasive ischemic eval planned given pt and family's desires for less aggressive measures    *** Recommendations are preliminary until cosigned by the attending.    Rahul De La Cruz MD  Cardiology Fellow    For all New Consults and Questions:  [email or web address]   Login: [employer] [facility] Cardiology Progress Note  ------------------------------------------------------------------------------------------    SUBJECTIVE/EVENTS:  - NAEO    -------------------------------------------------------------------------------------------  ROS:  CV: chest pain (-), palpitation (-), orthopnea (-), PND (-), edema (-)  PULM: SOB (-), cough (-), wheezing (-), hemoptysis (-).   CONST: fever (-), chills (-) or fatigue (-)  GI: abdominal distension (-), abdominal pain (-) , nausea/vomiting (-), hematemesis, (-), melena (-), hematochezia (-)  : dysuria (-), frequency (-), hematuria (-).   NEURO: numbness (-), weakness (-), dizziness (-)  MSK: myalgia (-), joint pain (-)   SKIN: itching (-), rash (-)  HEENT:  visual changes (-); vertigo or throat pain (-);  neck stiffness (-)   Psych: change in mood (-), anxiety (-), depression (-)     All other review of systems is negative unless indicated above.   -------------------------------------------------------------------------------------------  VITALS:  T(F): 97.8 (12-12), Max: 97.8 (12-11)  HR: 68 (-12) (61 - 98)  BP: 126/73 (12-12) (96/63 - 132/82)  RR: 18 (-12)  SpO2: 100% ()  I&O's Summary    ------------------------------------------------------------------------------------------  PHYSICAL EXAM:  GEN: NAD, sitting in bed  HEENT: NCAT, EOMI  CV: RRR, Ns1/s2, no m/r/g, JVP not elevated  RESP: CTA B/l, no w/r/r  ABD: soft, nt, nd  EXT: warm, 2+ pulses, no edema  SKIN: no visible lesions, rashes  NEURO: AAOx1, no focal deficits  PSYCH: Calm, cooperative  -------------------------------------------------------------------------------------------  LABS:                          13.0   4.92  )-----------( 199      ( 12 Dec 2023 05:02 )             41.5     1212    140  |  101  |  27<H>  ----------------------------<  103<H>  4.7   |  24  |  1.44<H>    Ca    9.5      12 Dec 2023 05:02    TPro  7.8  /  Alb  4.3  /  TBili  0.5  /  DBili  x   /  AST  49<H>  /  ALT  36<H>  /  AlkPhos  64  12    PT/INR - ( 11 Dec 2023 20:35 )   PT: 11.0 sec;   INR: 0.97 ratio         PTT - ( 12 Dec 2023 06:10 )  PTT:110.5 sec  CARDIAC MARKERS ( 12 Dec 2023 05:02 )  317 ng/L / x     / x     / 235 U/L / x     / 6.4 ng/mL  CARDIAC MARKERS ( 11 Dec 2023 18:33 )  540 ng/L / x     / x     / 210 U/L / x     / 8.2 ng/mL  CARDIAC MARKERS ( 11 Dec 2023 15:40 )  548 ng/L / x     / x     / x     / x     / x      CARDIAC MARKERS ( 11 Dec 2023 12:38 )  421 ng/L / x     / x     / x     / x     / x          Total Cholesterol: 170  LDL: --  HDL: 66  T        -------------------------------------------------------------------------------------------  Meds:  acetaminophen     Tablet .. 650 milliGRAM(s) Oral every 6 hours PRN  aspirin  chewable 81 milliGRAM(s) Oral daily  atorvastatin 20 milliGRAM(s) Oral at bedtime  clopidogrel Tablet 75 milliGRAM(s) Oral daily  furosemide   Injectable 40 milliGRAM(s) IV Push daily  heparin   Injectable 3500 Unit(s) IV Push every 6 hours PRN  heparin  Infusion.  Unit(s)/Hr IV Continuous <Continuous>  metoprolol succinate ER 25 milliGRAM(s) Oral daily  mirtazapine 7.5 milliGRAM(s) Oral daily  sacubitril 24 mG/valsartan 26 mG 1 Tablet(s) Oral two times a day    -------------------------------------------------------------------------------------------  Cardiovascular Diagnostic Testing:      -------------------------------------------------------------------------------------------  Assessment and Plan:   Ms. Maldonado is an 86 yo woman w/ hx of HFrEF (15-20%), CAD w/ 1DES in , known LBBB, HTN, HLD mild dementia and ataxia who presented to the ED w/ SOB found to have trop elevation.    #Trop elevation  #HFrEF  #CAD  #HTN  #LBBB  Story is more consistent w/ ischemic type sx and less c/w HF. Pt currently also appears euvolemic and w/o signs of gross overload on exam. Trop elevated 400s > 500s, and EKG is showing LBBB not meeting sgarbosa's criteria. Overall there is c/f ischemia given sx of intermittent CP y/d and then exertional CP this morning w/ the associated trop elevation. Other causes of trop elevation, including HTN, volume overload, anemia, sepsis are not found here and are less likely. Discussed plan w/ granddaughter who thinks pt would not want invasive procedures so will opt for medical management.    Recommendations:  - continue heparin gtt for ACS  - continue ASA 81mg and plavix 75mg for 1 year, than return to monotherapy  - continue home entresto 24-26  - continue home furosemide 40mg  - continue home metop 25  - continue atorvastatin  - can resume home dapagliflozin  - okay to get r/p TTE while here, if no major changes can DC home w/ cardiology f/up  - no invasive ischemic eval planned given pt and family's desires for less aggressive measures    *** Recommendations are preliminary until cosigned by the attending.    Rahul De La Cruz MD  Cardiology Fellow    For all New Consults and Questions:  [email or web address]   Login: [employer] Cardiology Progress Note  ------------------------------------------------------------------------------------------    SUBJECTIVE/EVENTS:  - NAEO    -------------------------------------------------------------------------------------------  ROS:  CV: chest pain (-), palpitation (-), orthopnea (-), PND (-), edema (-)  PULM: SOB (-), cough (-), wheezing (-), hemoptysis (-).   CONST: fever (-), chills (-) or fatigue (-)  GI: abdominal distension (-), abdominal pain (-) , nausea/vomiting (-), hematemesis, (-), melena (-), hematochezia (-)  : dysuria (-), frequency (-), hematuria (-).   NEURO: numbness (-), weakness (-), dizziness (-)  MSK: myalgia (-), joint pain (-)   SKIN: itching (-), rash (-)  HEENT:  visual changes (-); vertigo or throat pain (-);  neck stiffness (-)   Psych: change in mood (-), anxiety (-), depression (-)     All other review of systems is negative unless indicated above.   -------------------------------------------------------------------------------------------  VITALS:  T(F): 97.8 (12-12), Max: 97.8 (12-11)  HR: 68 (-12) (61 - 98)  BP: 126/73 (12-12) (96/63 - 132/82)  RR: 18 (-12)  SpO2: 100% ()  I&O's Summary    ------------------------------------------------------------------------------------------  PHYSICAL EXAM:  GEN: NAD, sitting in bed  HEENT: NCAT, EOMI  CV: RRR, Ns1/s2, no m/r/g, JVP not elevated  RESP: CTA B/l, no w/r/r  ABD: soft, nt, nd  EXT: warm, 2+ pulses, no edema  SKIN: no visible lesions, rashes  NEURO: AAOx1, no focal deficits  PSYCH: Calm, cooperative  -------------------------------------------------------------------------------------------  LABS:                          13.0   4.92  )-----------( 199      ( 12 Dec 2023 05:02 )             41.5     1212    140  |  101  |  27<H>  ----------------------------<  103<H>  4.7   |  24  |  1.44<H>    Ca    9.5      12 Dec 2023 05:02    TPro  7.8  /  Alb  4.3  /  TBili  0.5  /  DBili  x   /  AST  49<H>  /  ALT  36<H>  /  AlkPhos  64  12    PT/INR - ( 11 Dec 2023 20:35 )   PT: 11.0 sec;   INR: 0.97 ratio         PTT - ( 12 Dec 2023 06:10 )  PTT:110.5 sec  CARDIAC MARKERS ( 12 Dec 2023 05:02 )  317 ng/L / x     / x     / 235 U/L / x     / 6.4 ng/mL  CARDIAC MARKERS ( 11 Dec 2023 18:33 )  540 ng/L / x     / x     / 210 U/L / x     / 8.2 ng/mL  CARDIAC MARKERS ( 11 Dec 2023 15:40 )  548 ng/L / x     / x     / x     / x     / x      CARDIAC MARKERS ( 11 Dec 2023 12:38 )  421 ng/L / x     / x     / x     / x     / x          Total Cholesterol: 170  LDL: --  HDL: 66  T        -------------------------------------------------------------------------------------------  Meds:  acetaminophen     Tablet .. 650 milliGRAM(s) Oral every 6 hours PRN  aspirin  chewable 81 milliGRAM(s) Oral daily  atorvastatin 20 milliGRAM(s) Oral at bedtime  clopidogrel Tablet 75 milliGRAM(s) Oral daily  furosemide   Injectable 40 milliGRAM(s) IV Push daily  heparin   Injectable 3500 Unit(s) IV Push every 6 hours PRN  heparin  Infusion.  Unit(s)/Hr IV Continuous <Continuous>  metoprolol succinate ER 25 milliGRAM(s) Oral daily  mirtazapine 7.5 milliGRAM(s) Oral daily  sacubitril 24 mG/valsartan 26 mG 1 Tablet(s) Oral two times a day    -------------------------------------------------------------------------------------------  Cardiovascular Diagnostic Testing:      -------------------------------------------------------------------------------------------  Assessment and Plan:   Ms. Maldonado is an 86 yo woman w/ hx of HFrEF (15-20%), CAD w/ 1DES in , known LBBB, HTN, HLD mild dementia and ataxia who presented to the ED w/ SOB found to have trop elevation.    #Trop elevation  #HFrEF  #CAD  #HTN  #LBBB  Story is more consistent w/ ischemic type sx and less c/w HF. Pt currently also appears euvolemic and w/o signs of gross overload on exam. Trop elevated 400s > 500s, and EKG is showing LBBB not meeting sgarbosa's criteria. Overall there is c/f ischemia given sx of intermittent CP y/d and then exertional CP this morning w/ the associated trop elevation. Other causes of trop elevation, including HTN, volume overload, anemia, sepsis are not found here and are less likely. Discussed plan w/ granddaughter who thinks pt would not want invasive procedures so will opt for medical management.    Recommendations:  - continue heparin gtt for ACS, can DC this afternoon  - please start pantoprazole 40mg qd, continue for 1 year while on DAPT  - continue ASA 81mg and plavix 75mg for 1 year, than return to monotherapy  - continue home entresto 24-26  - continue home furosemide 40mg  - continue home metop 25  - continue atorvastatin  - can resume home dapagliflozin  - okay to get r/p TTE while here, if no major changes can DC home w/ cardiology f/up  - no invasive ischemic eval planned given pt and family's desires for less aggressive measures    *** Recommendations are preliminary until cosigned by the attending.    Rahul De La Cruz MD  Cardiology Fellow    For all New Consults and Questions:  [email or web address]   Login: [facility] Progress Note  ------------------------------------------------------------------------------------------    SUBJECTIVE/EVENTS:  - NAEO    -------------------------------------------------------------------------------------------  ROS:  CV: chest pain (-), palpitation (-), orthopnea (-), PND (-), edema (-)  PULM: SOB (-), cough (-), wheezing (-), hemoptysis (-).   CONST: fever (-), chills (-) or fatigue (-)  GI: abdominal distension (-), abdominal pain (-) , nausea/vomiting (-), hematemesis, (-), melena (-), hematochezia (-)  : dysuria (-), frequency (-), hematuria (-).   NEURO: numbness (-), weakness (-), dizziness (-)  MSK: myalgia (-), joint pain (-)   SKIN: itching (-), rash (-)  HEENT:  visual changes (-); vertigo or throat pain (-);  neck stiffness (-)   Psych: change in mood (-), anxiety (-), depression (-)     All other review of systems is negative unless indicated above.   -------------------------------------------------------------------------------------------  VITALS:  T(F): 97.8 (12-12), Max: 97.8 (12-11)  HR: 68 (-12) (61 - 98)  BP: 126/73 (12-12) (96/63 - 132/82)  RR: 18 (-12)  SpO2: 100% ()  I&O's Summary    ------------------------------------------------------------------------------------------  PHYSICAL EXAM:  GEN: NAD, sitting in bed  HEENT: NCAT, EOMI  CV: RRR, Ns1/s2, no m/r/g, JVP not elevated  RESP: CTA B/l, no w/r/r  ABD: soft, nt, nd  EXT: warm, 2+ pulses, no edema  SKIN: no visible lesions, rashes  NEURO: AAOx1, no focal deficits  PSYCH: Calm, cooperative  -------------------------------------------------------------------------------------------  LABS:                          13.0   4.92  )-----------( 199      ( 12 Dec 2023 05:02 )             41.5     1212    140  |  101  |  27<H>  ----------------------------<  103<H>  4.7   |  24  |  1.44<H>    Ca    9.5      12 Dec 2023 05:02    TPro  7.8  /  Alb  4.3  /  TBili  0.5  /  DBili  x   /  AST  49<H>  /  ALT  36<H>  /  AlkPhos  64  12    PT/INR - ( 11 Dec 2023 20:35 )   PT: 11.0 sec;   INR: 0.97 ratio         PTT - ( 12 Dec 2023 06:10 )  PTT:110.5 sec  CARDIAC MARKERS ( 12 Dec 2023 05:02 )  317 ng/L / x     / x     / 235 U/L / x     / 6.4 ng/mL  CARDIAC MARKERS ( 11 Dec 2023 18:33 )  540 ng/L / x     / x     / 210 U/L / x     / 8.2 ng/mL  CARDIAC MARKERS ( 11 Dec 2023 15:40 )  548 ng/L / x     / x     / x     / x     / x      CARDIAC MARKERS ( 11 Dec 2023 12:38 )  421 ng/L / x     / x     / x     / x     / x          Total Cholesterol: 170  LDL: --  HDL: 66  T        -------------------------------------------------------------------------------------------  Meds:  acetaminophen     Tablet .. 650 milliGRAM(s) Oral every 6 hours PRN  aspirin  chewable 81 milliGRAM(s) Oral daily  atorvastatin 20 milliGRAM(s) Oral at bedtime  clopidogrel Tablet 75 milliGRAM(s) Oral daily  furosemide   Injectable 40 milliGRAM(s) IV Push daily  heparin   Injectable 3500 Unit(s) IV Push every 6 hours PRN  heparin  Infusion.  Unit(s)/Hr IV Continuous <Continuous>  metoprolol succinate ER 25 milliGRAM(s) Oral daily  mirtazapine 7.5 milliGRAM(s) Oral daily  sacubitril 24 mG/valsartan 26 mG 1 Tablet(s) Oral two times a day    -------------------------------------------------------------------------------------------  Cardiovascular Diagnostic Testing:      -------------------------------------------------------------------------------------------  Assessment and Plan:   Ms. Maldonado is an 88 yo woman w/ hx of HFrEF (15-20%), CAD w/ 1DES in , known LBBB, HTN, HLD mild dementia and ataxia who presented to the ED w/ SOB found to have trop elevation.    #Trop elevation  #HFrEF  #CAD  #HTN  #LBBB  Story is more consistent w/ ischemic type sx and less c/w HF. Pt currently also appears euvolemic and w/o signs of gross overload on exam. Trop elevated 400s > 500s, and EKG is showing LBBB not meeting sgarbosa's criteria. Overall there is c/f ischemia given sx of intermittent CP y/d and then exertional CP this morning w/ the associated trop elevation. Other causes of trop elevation, including HTN, volume overload, anemia, sepsis are not found here and are less likely. Discussed plan w/ granddaughter who thinks pt would not want invasive procedures so will opt for medical management.    Recommendations:  - continue heparin gtt for ACS, can DC this afternoon  - please start pantoprazole 40mg qd, continue for 1 year while on DAPT  - continue ASA 81mg and plavix 75mg for 1 year, than return to monotherapy  - continue home entresto 24-26  - continue home furosemide 40mg  - continue home metop 25  - continue atorvastatin  - can resume home dapagliflozin  - okay to get r/p TTE while here, if no major changes can DC home w/ cardiology f/up  - no invasive ischemic eval planned given pt and family's desires for less aggressive measures    *** Recommendations are preliminary until cosigned by the attending.    Rahul De La Cruz MD  Cardiology Fellow    For all New Consults and Questions:  [email or web address]   Login: [employer] [facility] Progress Note  ------------------------------------------------------------------------------------------    SUBJECTIVE/EVENTS:  - NAEO    -------------------------------------------------------------------------------------------  ROS:  CV: chest pain (-), palpitation (-), orthopnea (-), PND (-), edema (-)  PULM: SOB (-), cough (-), wheezing (-), hemoptysis (-).   CONST: fever (-), chills (-) or fatigue (-)  GI: abdominal distension (-), abdominal pain (-) , nausea/vomiting (-), hematemesis, (-), melena (-), hematochezia (-)  : dysuria (-), frequency (-), hematuria (-).   NEURO: numbness (-), weakness (-), dizziness (-)  MSK: myalgia (-), joint pain (-)   SKIN: itching (-), rash (-)  HEENT:  visual changes (-); vertigo or throat pain (-);  neck stiffness (-)   Psych: change in mood (-), anxiety (-), depression (-)     All other review of systems is negative unless indicated above.   -------------------------------------------------------------------------------------------  VITALS:  T(F): 97.8 (12-12), Max: 97.8 (12-11)  HR: 68 (-12) (61 - 98)  BP: 126/73 (12-12) (96/63 - 132/82)  RR: 18 (-12)  SpO2: 100% ()  I&O's Summary    ------------------------------------------------------------------------------------------  PHYSICAL EXAM:  GEN: NAD, sitting in bed  HEENT: NCAT, EOMI  CV: RRR, Ns1/s2, no m/r/g, JVP not elevated  RESP: CTA B/l, no w/r/r  ABD: soft, nt, nd  EXT: warm, 2+ pulses, no edema  SKIN: no visible lesions, rashes  NEURO: AAOx1, no focal deficits  PSYCH: Calm, cooperative  -------------------------------------------------------------------------------------------  LABS:                          13.0   4.92  )-----------( 199      ( 12 Dec 2023 05:02 )             41.5     1212    140  |  101  |  27<H>  ----------------------------<  103<H>  4.7   |  24  |  1.44<H>    Ca    9.5      12 Dec 2023 05:02    TPro  7.8  /  Alb  4.3  /  TBili  0.5  /  DBili  x   /  AST  49<H>  /  ALT  36<H>  /  AlkPhos  64  12    PT/INR - ( 11 Dec 2023 20:35 )   PT: 11.0 sec;   INR: 0.97 ratio         PTT - ( 12 Dec 2023 06:10 )  PTT:110.5 sec  CARDIAC MARKERS ( 12 Dec 2023 05:02 )  317 ng/L / x     / x     / 235 U/L / x     / 6.4 ng/mL  CARDIAC MARKERS ( 11 Dec 2023 18:33 )  540 ng/L / x     / x     / 210 U/L / x     / 8.2 ng/mL  CARDIAC MARKERS ( 11 Dec 2023 15:40 )  548 ng/L / x     / x     / x     / x     / x      CARDIAC MARKERS ( 11 Dec 2023 12:38 )  421 ng/L / x     / x     / x     / x     / x          Total Cholesterol: 170  LDL: --  HDL: 66  T        -------------------------------------------------------------------------------------------  Meds:  acetaminophen     Tablet .. 650 milliGRAM(s) Oral every 6 hours PRN  aspirin  chewable 81 milliGRAM(s) Oral daily  atorvastatin 20 milliGRAM(s) Oral at bedtime  clopidogrel Tablet 75 milliGRAM(s) Oral daily  furosemide   Injectable 40 milliGRAM(s) IV Push daily  heparin   Injectable 3500 Unit(s) IV Push every 6 hours PRN  heparin  Infusion.  Unit(s)/Hr IV Continuous <Continuous>  metoprolol succinate ER 25 milliGRAM(s) Oral daily  mirtazapine 7.5 milliGRAM(s) Oral daily  sacubitril 24 mG/valsartan 26 mG 1 Tablet(s) Oral two times a day    -------------------------------------------------------------------------------------------  Cardiovascular Diagnostic Testing:      -------------------------------------------------------------------------------------------  Assessment and Plan:   Ms. Maldonado is an 86 yo woman w/ hx of HFrEF (15-20%), CAD w/ 1DES in , known LBBB, HTN, HLD mild dementia and ataxia who presented to the ED w/ SOB found to have trop elevation.    #Trop elevation  #HFrEF  #CAD  #HTN  #LBBB  Story is more consistent w/ ischemic type sx and less c/w HF. Pt currently also appears euvolemic and w/o signs of gross overload on exam. Trop elevated 400s > 500s, and EKG is showing LBBB not meeting sgarbosa's criteria. Overall there is c/f ischemia given sx of intermittent CP y/d and then exertional CP this morning w/ the associated trop elevation. Other causes of trop elevation, including HTN, volume overload, anemia, sepsis are not found here and are less likely. Discussed plan w/ granddaughter who thinks pt would not want invasive procedures so will opt for medical management.    Recommendations:  - ok to stop heparin gtt  - please start pantoprazole 40mg qd, continue for 1 year while on DAPT  - continue ASA 81mg and plavix 75mg for 1 year, than return to monotherapy  - continue home entresto 24-26  - continue home furosemide 40mg  - continue home metop 25  - continue atorvastatin  - can resume home dapagliflozin  - please get TTE, if no major changes can DC home w/ cardiology f/up on above regimen  - no invasive ischemic eval planned given pt and family's desires for less aggressive measures    *** Recommendations are preliminary until cosigned by the attending.    Rahul De La Cruz MD  Cardiology Fellow    For all New Consults and Questions:  [email or web address]   Login: [employer] Cardiology Progress Note  ------------------------------------------------------------------------------------------    SUBJECTIVE/EVENTS:  - NAEO    -------------------------------------------------------------------------------------------  ROS:  CV: chest pain (-), palpitation (-), orthopnea (-), PND (-), edema (-)  PULM: SOB (-), cough (-), wheezing (-), hemoptysis (-).   CONST: fever (-), chills (-) or fatigue (-)  GI: abdominal distension (-), abdominal pain (-) , nausea/vomiting (-), hematemesis, (-), melena (-), hematochezia (-)  : dysuria (-), frequency (-), hematuria (-).   NEURO: numbness (-), weakness (-), dizziness (-)  MSK: myalgia (-), joint pain (-)   SKIN: itching (-), rash (-)  HEENT:  visual changes (-); vertigo or throat pain (-);  neck stiffness (-)   Psych: change in mood (-), anxiety (-), depression (-)     All other review of systems is negative unless indicated above.   -------------------------------------------------------------------------------------------  VITALS:  T(F): 97.8 (12-12), Max: 97.8 (12-11)  HR: 68 (-12) (61 - 98)  BP: 126/73 (12-12) (96/63 - 132/82)  RR: 18 (-12)  SpO2: 100% ()  I&O's Summary    ------------------------------------------------------------------------------------------  PHYSICAL EXAM:  GEN: NAD, sitting in bed  HEENT: NCAT, EOMI  CV: RRR, Ns1/s2, no m/r/g, JVP not elevated  RESP: CTA B/l, no w/r/r  ABD: soft, nt, nd  EXT: warm, 2+ pulses, no edema  SKIN: no visible lesions, rashes  NEURO: AAOx1, no focal deficits  PSYCH: Calm, cooperative  -------------------------------------------------------------------------------------------  LABS:                          13.0   4.92  )-----------( 199      ( 12 Dec 2023 05:02 )             41.5     1212    140  |  101  |  27<H>  ----------------------------<  103<H>  4.7   |  24  |  1.44<H>    Ca    9.5      12 Dec 2023 05:02    TPro  7.8  /  Alb  4.3  /  TBili  0.5  /  DBili  x   /  AST  49<H>  /  ALT  36<H>  /  AlkPhos  64  12    PT/INR - ( 11 Dec 2023 20:35 )   PT: 11.0 sec;   INR: 0.97 ratio         PTT - ( 12 Dec 2023 06:10 )  PTT:110.5 sec  CARDIAC MARKERS ( 12 Dec 2023 05:02 )  317 ng/L / x     / x     / 235 U/L / x     / 6.4 ng/mL  CARDIAC MARKERS ( 11 Dec 2023 18:33 )  540 ng/L / x     / x     / 210 U/L / x     / 8.2 ng/mL  CARDIAC MARKERS ( 11 Dec 2023 15:40 )  548 ng/L / x     / x     / x     / x     / x      CARDIAC MARKERS ( 11 Dec 2023 12:38 )  421 ng/L / x     / x     / x     / x     / x          Total Cholesterol: 170  LDL: --  HDL: 66  T        -------------------------------------------------------------------------------------------  Meds:  acetaminophen     Tablet .. 650 milliGRAM(s) Oral every 6 hours PRN  aspirin  chewable 81 milliGRAM(s) Oral daily  atorvastatin 20 milliGRAM(s) Oral at bedtime  clopidogrel Tablet 75 milliGRAM(s) Oral daily  furosemide   Injectable 40 milliGRAM(s) IV Push daily  heparin   Injectable 3500 Unit(s) IV Push every 6 hours PRN  heparin  Infusion.  Unit(s)/Hr IV Continuous <Continuous>  metoprolol succinate ER 25 milliGRAM(s) Oral daily  mirtazapine 7.5 milliGRAM(s) Oral daily  sacubitril 24 mG/valsartan 26 mG 1 Tablet(s) Oral two times a day    -------------------------------------------------------------------------------------------  Cardiovascular Diagnostic Testing:      -------------------------------------------------------------------------------------------  Assessment and Plan:   Ms. Maldonado is an 88 yo woman w/ hx of HFrEF (15-20%), CAD w/ 1DES in , known LBBB, HTN, HLD mild dementia and ataxia who presented to the ED w/ SOB found to have trop elevation.    #Trop elevation  #HFrEF  #CAD  #HTN  #LBBB  Story is more consistent w/ ischemic type sx and less c/w HF. Pt currently also appears euvolemic and w/o signs of gross overload on exam. Trop elevated 400s > 500s, and EKG is showing LBBB not meeting sgarbosa's criteria. Overall there is c/f ischemia given sx of intermittent CP y/d and then exertional CP this morning w/ the associated trop elevation. Other causes of trop elevation, including HTN, volume overload, anemia, sepsis are not found here and are less likely. Discussed plan w/ granddaughter who thinks pt would not want invasive procedures so will opt for medical management.    Recommendations:  - ok to stop heparin gtt  - please start pantoprazole 40mg qd, continue for 1 year while on DAPT  - continue ASA 81mg and plavix 75mg for 1 year, than return to monotherapy  - continue home entresto 24-26  - continue home furosemide 40mg  - continue home metop 25  - continue atorvastatin  - can resume home dapagliflozin  - please get TTE, if no major changes can DC home w/ cardiology f/up on above regimen  - no invasive ischemic eval planned given pt and family's desires for less aggressive measures    *** Recommendations are preliminary until cosigned by the attending.    Rahul De La Cruz MD  Cardiology Fellow    For all New Consults and Questions:  [email or web address]   Login: [email or web address]

## 2023-12-12 NOTE — PATIENT PROFILE ADULT - FALL HARM RISK - HARM RISK INTERVENTIONS
Assistance with ambulation/Assistance OOB with selected safe patient handling equipment/Communicate Risk of Fall with Harm to all staff/Discuss with provider need for PT consult/Monitor gait and stability/Provide patient with walking aids - walker, cane, crutches/Reinforce activity limits and safety measures with patient and family/Tailored Fall Risk Interventions/Visual Cue: Yellow wristband and red socks/Bed in lowest position, wheels locked, appropriate side rails in place/Call bell, personal items and telephone in reach/Instruct patient to call for assistance before getting out of bed or chair/Non-slip footwear when patient is out of bed/[place] to call system/Physically safe environment - no spills, clutter or unnecessary equipment/Purposeful Proactive Rounding/Room/bathroom lighting operational, light cord in reach Assistance with ambulation/Assistance OOB with selected safe patient handling equipment/Communicate Risk of Fall with Harm to all staff/Discuss with provider need for PT consult/Monitor gait and stability/Provide patient with walking aids - walker, cane, crutches/Reinforce activity limits and safety measures with patient and family/Tailored Fall Risk Interventions/Visual Cue: Yellow wristband and red socks/Bed in lowest position, wheels locked, appropriate side rails in place/Call bell, personal items and telephone in reach/Instruct patient to call for assistance before getting out of bed or chair/Non-slip footwear when patient is out of bed/[place] to call system/Physically safe environment - no spills, clutter or unnecessary equipment/Purposeful Proactive Rounding/Room/bathroom lighting operational, light cord in reach

## 2023-12-12 NOTE — DISCHARGE NOTE PROVIDER - PROVIDER TOKENS
PROVIDER:[TOKEN:[[id number]],FOLLOWUP:[2 weeks]] PROVIDER:[TOKEN:[[id number]],FOLLOWUP:[2 weeks]]

## 2023-12-12 NOTE — DISCHARGE NOTE PROVIDER - NSDCFUADDAPPT_GEN_ALL_CORE_FT
APPTS ARE READY TO BE MADE: [X] YES    Best Family or Patient Contact (if needed): Granddaughter Justa: [phone]    Additional Information about above appointments (if needed):    1: Please make an appointment with cardiology within two weeks after discharge  2:   3:     Other comments or requests:    APPTS ARE READY TO BE MADE: [X] YES    Best Family or Patient Contact (if needed): Granddaughter Justa: [phone]    Additional Information about above appointments (if needed):    1: Please make an appointment with cardiology within two weeks after discharge  2:   3:     Other comments or requests:    APPTS ARE READY TO BE MADE: [X] YES    Best Family or Patient Contact (if needed): Granddaughter Justa: [phone]    Additional Information about above appointments (if needed):    1: Please make an appointment with cardiology within two weeks after discharge  2:   3:     Other comments or requests:   Patient was previously scheduled with WILEY Stephens on 12/18 at 4:30pm at [address] [place] [address] for a cardiology appointment.  APPTS ARE READY TO BE MADE: [X] YES    Best Family or Patient Contact (if needed): Granddaughter Justa: [phone]    Additional Information about above appointments (if needed):    1: Please make an appointment with cardiology within two weeks after discharge  2:   3:     Other comments or requests:   Patient was previously scheduled with WILEY Stephens on 12/18 at 4:30pm at [address] [place] [address] for a cardiology appointment.

## 2023-12-12 NOTE — PHYSICAL THERAPY INITIAL EVALUATION ADULT - PATIENT/FAMILY/SIGNIFICANT OTHER GOALS STATEMENT, PT EVAL
Patient unable to provide goals due to altered mental status. As per family at bedside, patient wants to walk and feel stronger.

## 2023-12-12 NOTE — DISCHARGE NOTE PROVIDER - HOSPITAL COURSE
88 yo woman w/ hx of HFrEF (15-20%), CAD w/ 1DES in 2006, known LBBB, HTN, HLD mild dementia and ataxia who presented to the ED w/ SOB found to have trop elevation. Cardiology evaluated patient and recommended heparin gtt, plavix load follow by maintenance dose. Per cardiology, TTE no change from prior. Patient doesn't want invasive intervention and opted for medical management. Cardiology recommended to stop heparin gtt on 12/12 now, ok to be discharged home with outpatient f/u.    New meds:  plavix  pantoprazole     86 yo woman w/ hx of HFrEF (15-20%), CAD w/ 1DES in 2006, known LBBB, HTN, HLD mild dementia and ataxia who presented to the ED w/ SOB found to have trop elevation. Cardiology evaluated patient and recommended heparin gtt, plavix load follow by maintenance dose. Per cardiology, TTE no change from prior. Patient doesn't want invasive intervention and opted for medical management. Cardiology recommended to stop heparin gtt on 12/12 now, ok to be discharged home with outpatient f/u.    New meds:  plavix  pantoprazole     88 yo woman w/ hx of HFrEF (15-20%), CAD w/ 1DES in 2006, known LBBB, HTN, HLD mild dementia and ataxia who presented to the ED w/ SOB found to have trop elevation. Cardiology evaluated patient and recommended heparin gtt, plavix load follow by maintenance dose. Per cardiology, TTE no change from prior. Patient doesn't want invasive intervention and opted for medical management. Cardiology recommended to stop heparin gtt on 12/12 now, ok to be discharged home with outpatient f/u. PT recommended rehab. Discussed with family, who wants to take patient home instead and doesn't want to wait for home care to be arranged. Wants to take patient home tonight.    New meds:  plavix  pantoprazole

## 2023-12-12 NOTE — PHYSICAL THERAPY INITIAL EVALUATION ADULT - PERTINENT HX OF CURRENT PROBLEM, REHAB EVAL
88 year old female hx HFrEF (15-20%; 4/2023), CAD (s/p JASMYNE x1 2006, known LBBB), HTN, HLD, and mild dementia and ataxia presented to [facility] ED reporting SOB/VILLELA and CP. Patient with baseline dementia and unable to provide history. History taken from chart review. Reportedly patient was "not feeling herself" during the morning. Proceeded to walk up the stairs and had spell of dyspnea and chest pain, but unable to clearly articulate her discomfort. Patient was holding her chest however. Patient has infrequent episodes of feeling off but has not complained of chest pain during these episodes. Granddaughter (Justa) regularly provides her her medications and she has not missed any of her cardiac meds. Per Justa, patient also has not complained of any recent fevers/chills, SOB/VILLELA, cough, abdominal pain, nausea, vomiting. 88 year old female hx HFrEF (15-20%; 4/2023), CAD (s/p JASMYNE x1 2006, known LBBB), HTN, HLD, and mild dementia and ataxia presented to [facility] ED reporting SOB/VILLELA and CP. Patient with baseline dementia and unable to provide history. History taken from chart review. Reportedly patient was "not feeling herself" during the morning. Proceeded to walk up the stairs and had spell of dyspnea and chest pain, but unable to clearly articulate her discomfort. Patient was holding her chest however. Patient has infrequent episodes of feeling off but has not complained of chest pain during these episodes. Granddaughter (Justa) regularly provides her her medications and she has not missed any of her cardiac meds. Per Justa, patient also has not complained of any recent fevers/chills, SOB/VILLELA, cough, abdominal pain, nausea, vomiting.

## 2023-12-12 NOTE — DISCHARGE NOTE NURSING/CASE MANAGEMENT/SOCIAL WORK - NSDCPEFALRISK_GEN_ALL_CORE
For information on Fall & Injury Prevention, visit: [email or web address] OR  [email or web address] OR  https://www.cdc.gov/steadi/patient.html For information on Fall & Injury Prevention, visit: [email or web address]/[email or web address] OR  [email or web address]/[email or web address] OR  https://www.cdc.gov/steadi/patient.html

## 2023-12-12 NOTE — ED ADULT NURSE REASSESSMENT NOTE - NS ED NURSE REASSESS COMMENT FT1
Pt has not urinated since arrival, MD alvarado made aware, no interventions at this time. safety maintained.
Received report from break TESSIE Bryan. Pt resting comfortably in stretcher, granddaughter at bedside. Denies chest pain or SOB at this time. breathing even and nonlabored. NSR on cardiac monitor. stretcher in lowest position, call bell within reach, safety maintained.
Break RN: Pt is A&Ox4, resting in stretcher with no complaints at this time. Respirations even and unlabored, chest rise equal b/l. Sinus rhythm noted on cardiac monitor. VS as noted in flow sheets. Pt denies chest pain, SOB, fever, cough, chills, abdominal pain, N/V/D, h/a, dizziness, numbness/tingling or any urinary symptoms at this time. Heparin verified and infusing at ordered, see MAR. No acute distress noted. Safety maintained throughout.

## 2023-12-12 NOTE — DISCHARGE NOTE PROVIDER - NSDCMRMEDTOKEN_GEN_ALL_CORE_FT
aspirin 81 mg oral delayed release tablet: 1 tab(s) orally once a day  atorvastatin 20 mg oral tablet: 1 tab(s) orally once a day (at bedtime)  Farxiga 10 mg oral tablet: 1 tab(s) orally once a day  Lasix 40 mg oral tablet: 1 tab(s) orally once a day  metoprolol succinate 25 mg oral tablet, extended release: 2 tab(s) orally once a day  mirtazapine 7.5 mg oral tablet: 1 tab(s) orally once a day   aspirin 81 mg oral delayed release tablet: 1 tab(s) orally once a day  atorvastatin 20 mg oral tablet: 1 tab(s) orally once a day (at bedtime)  clopidogrel 75 mg oral tablet: 1 tab(s) orally once a day  Farxiga 10 mg oral tablet: 1 tab(s) orally once a day  Lasix 40 mg oral tablet: 1 tab(s) orally once a day  metoprolol succinate 25 mg oral tablet, extended release: 2 tab(s) orally once a day  mirtazapine 7.5 mg oral tablet: 1 tab(s) orally once a day  pantoprazole 40 mg oral delayed release tablet: 1 tab(s) orally once a day (before a meal)  sacubitril-valsartan 24 mg-26 mg oral tablet: 1 tab(s) orally 2 times a day   aspirin 81 mg oral delayed release tablet: 1 tab(s) orally once a day  atorvastatin 20 mg oral tablet: 1 tab(s) orally once a day (at bedtime)  clopidogrel 75 mg oral tablet: 1 tab(s) orally once a day  Farxiga 10 mg oral tablet: 1 tab(s) orally once a day  Lasix 40 mg oral tablet: 1 tab(s) orally once a day  metoprolol succinate 25 mg oral tablet, extended release: 2 tab(s) orally once a day  mirtazapine 7.5 mg oral tablet: 1 tab(s) orally once a day  outpatient Physical therapy: based on PT assessment  pantoprazole 40 mg oral delayed release tablet: 1 tab(s) orally once a day (before a meal)  sacubitril-valsartan 24 mg-26 mg oral tablet: 1 tab(s) orally 2 times a day

## 2023-12-12 NOTE — PROGRESS NOTE ADULT - ATTENDING COMMENTS
pt's symptoms (and unfortunately lack of collateral due to cognitive impairment) are concerning for ACS  she appears euvolemic on exam  After a prior goals of care conversation there was a decision to continue medical therapy.  cont DAPT  PPI while on DAPT to reduce risk of bleeding  -cont bblocker, Entresto .

## 2023-12-13 ENCOUNTER — TRANSCRIPTION ENCOUNTER (OUTPATIENT)
Age: 88
End: 2023-12-13

## 2023-12-13 ENCOUNTER — NON-APPOINTMENT (OUTPATIENT)
Age: 88
End: 2023-12-13

## 2023-12-13 PROBLEM — F03.90 UNSPECIFIED DEMENTIA WITHOUT BEHAVIORAL DISTURBANCE: Chronic | Status: ACTIVE | Noted: 2023-12-12

## 2023-12-13 PROBLEM — I27.20 PULMONARY HYPERTENSION, UNSPECIFIED: Chronic | Status: ACTIVE | Noted: 2023-12-12

## 2023-12-15 NOTE — ED ADULT NURSE NOTE - TEMPLATE LIST FOR HEAD TO TOE ASSESSMENT
Current refill request refused due to refill is either a duplicate request or has active refills at the pharmacy. Check previous templates.     Requested Prescriptions     Refused Prescriptions Disp Refills    MEGESTROL 40 MG/ML Oral Suspension [Pharmacy Med Name: MEGESTROL ACETATE 40MG/ML SUSP]  0     Sig: SHAKE LIQUID AND TAKE 20 ML(800 MG) BY MOUTH DAILY     Refused By: Yajaira Barber     Reason for Refusal: Request already responded to by other means (e.g. phone or fax) General

## 2023-12-16 LAB
CULTURE RESULTS: SIGNIFICANT CHANGE UP
SPECIMEN SOURCE: SIGNIFICANT CHANGE UP

## 2023-12-18 ENCOUNTER — APPOINTMENT (OUTPATIENT)
Dept: CARDIOLOGY | Facility: CLINIC | Age: 88
End: 2023-12-18

## 2023-12-18 ENCOUNTER — NON-APPOINTMENT (OUTPATIENT)
Age: 88
End: 2023-12-18

## 2023-12-18 ENCOUNTER — TRANSCRIPTION ENCOUNTER (OUTPATIENT)
Age: 88
End: 2023-12-18

## 2023-12-18 VITALS
HEART RATE: 80 BPM | BODY MASS INDEX: 20.77 KG/M2 | SYSTOLIC BLOOD PRESSURE: 110 MMHG | OXYGEN SATURATION: 96 % | WEIGHT: 121 LBS | DIASTOLIC BLOOD PRESSURE: 60 MMHG

## 2023-12-19 ENCOUNTER — APPOINTMENT (OUTPATIENT)
Dept: CARDIOLOGY | Facility: CLINIC | Age: 88
End: 2023-12-19
Payer: MEDICARE

## 2023-12-19 PROCEDURE — 99214 OFFICE O/P EST MOD 30 MIN: CPT | Mod: 95

## 2023-12-19 NOTE — REASON FOR VISIT
[Arrhythmia/ECG Abnorrmalities] : arrhythmia/ECG abnormalities [Coronary Artery Disease] : coronary artery disease [Other: _____] : [unfilled] [FreeTextEntry1] : December 2023- TeleHealth Video Encounter Initiated by: Patient election for TeleHealth visit Patient was consented for TeleHealth visit Patient Location: Home with her granddaughter, Justa Physician Location: Office ([address] [place] [address], [address], [place], [place], [address]) Duration of Encounter: 30 minutes, at least 50% of which was spent in direct counseling and coordination of care.   She was hospitalized from 12/11 - 12/12/2023 because of chest pain and dyspnea on exertion.

## 2023-12-19 NOTE — HISTORY OF PRESENT ILLNESS
[FreeTextEntry1] : Patient is an 88 year-old Black woman who has recently moved and is changing doctors. She has a known past medical history of hypertension, dyslipidemia, coronary artery disease status post PCI (2013), with known dementia, who was recently noted to have a LBBB on her ECG. She has no new cardiovascular complaints.  She continues walking and dancing for exercise.   February 2023 - Patient was in [place] in December and January. While she was there, she had chest pain and went to the hospital where she was told she had a heart attack. She was given clopidogrel 75 mg and sildenafil 50 mg as new prescriptions, but they have not been continued. She is currently maintained on sertraline 25 mg daily, ASA 81 mg daily, atorvastatin 20 mg daily, and metoprolol succinate 25 mg daily.  April 2023 - Patient returns today for follow-up after recent hospitalization for chest discomfort. She was seen here by Dr. Egan on 3/24/2023 and started on Entresto 24-26 mg BID. One week later, she was admitted to [facility] with chest pain that improved with diuresis. There was no evidence of acute coronary syndrome. She has felt well since discharge. Of note, she was discharged off her Entresto because of relative hypotension.   July 2023 - Patient returns today for follow-up. Her weight has been up by approximately 2-3 lbs this week. She was given more diuretics, but then labs suggested she was dehydrated and she reduced her dosing for the rest of the week. Repeat labs showed interval improvement in renal function, but creatinine remained above her baseline. She reported easy fatigability, but no shortness of breath or notable edema.   July 20, 2023 - She returns today for follow-up two weeks after her last visit. She has continued to lose weight. She is now 122 lbs on her home scale. She has been taking Ensure nutritional supplements.

## 2023-12-19 NOTE — DISCUSSION/SUMMARY
[FreeTextEntry1] : Patient is an 88 year-old Black woman with multiple cardiovascular risk factors and known coronary artery disease, presents for evaluation of a LBBB seen on ECG. She has no acute cardiac complaints. She has dementia that is mild, but progressive.   Continue ASA and statin therapy for patient with known coronary artery disease status post PCI.  For patient with severely reduced LVEF, continue beta-blocker and SGLT-2 inhibitor.  Recommend continuing low dose Entresto. Referred to EP for consideration of CRT-P for patient with LBBB and severely reduced LVEF. Patient and her family have chosen to defer elective PPM implant at this time.

## 2023-12-19 NOTE — CARDIOLOGY SUMMARY
[de-identified] : 3/14/2022, sinus rhythm, 68 bpm, LBBB 5/18/2022, sinus rhythm, 69 bpm, LBBB 12/18/2023, sinus rhythm at 75 bpm, PVC x1, LBBB [de-identified] : 1/9/2023 in [place] - moderate MR, moderate-severe TR, pulmonary hypertension, septal and lateral wall motion abnormality, LVEF 35% 3/3/2023, dilated LA, severe pulmonary hypertension, PASP 64 mmHg, severe global LV dysfunction, LVEF 15-20% 12/12/2023, normal LA, moderate pulmonary pressures, PASP 48 mmHg, severe global LV dysfunction,, LVEF 25%

## 2024-01-02 ENCOUNTER — APPOINTMENT (OUTPATIENT)
Dept: GERIATRICS | Facility: CLINIC | Age: 89
End: 2024-01-02
Payer: MEDICARE

## 2024-01-02 VITALS
BODY MASS INDEX: 21.6 KG/M2 | SYSTOLIC BLOOD PRESSURE: 121 MMHG | HEIGHT: 64 IN | DIASTOLIC BLOOD PRESSURE: 66 MMHG | WEIGHT: 126.5 LBS | HEART RATE: 85 BPM | OXYGEN SATURATION: 96 % | TEMPERATURE: 97 F

## 2024-01-02 PROCEDURE — 99214 OFFICE O/P EST MOD 30 MIN: CPT

## 2024-01-02 PROCEDURE — 99215 OFFICE O/P EST HI 40 MIN: CPT

## 2024-01-02 NOTE — PHYSICAL EXAM
[Normal Outer Ear/Nose] : the ears and nose were normal in appearance [Normal Appearance] : normal in appearance [Cervical Lymph Nodes Enlarged Posterior Bilaterally] : posterior cervical [Cervical Lymph Nodes Enlarged Anterior Bilaterally] : anterior cervical, supraclavicular [No Clubbing, Cyanosis] : no clubbing or cyanosis of the fingernails [Involuntary Movements] : no involuntary movements were seen [Motor Tone] : muscle strength and tone were normal [Normal] : normal skin color and pigmentation [No Focal Deficits] : no focal deficits [Normal Affect] : the affect was normal [Normal Mood] : the mood was normal [Normal Hearing] : hearing was not normal [Normal Gait] : abnormal gait [Normal Insight/Judgment] : insight and judgment were not intact [de-identified] : KAYLAH [de-identified] : slow steady cautious gait  [de-identified] : confused [de-identified] : calm, cooperative  [MocaTotal] : 11 [FreeTextEntry1] : 11/1/22 w/ NP

## 2024-01-02 NOTE — HISTORY OF PRESENT ILLNESS
[Patient reported hearing was abnormal] : Patient reported hearing was abnormal [Patient denied dental screening.] : Patient denied dental screening [Patient declined colon rectal/cancer screening] : Patient declined colon/rectal cancer screening [Patient declined skin cancer screening] : Patient declined skin cancer screening [Patient declined breast sonogram] : Patient declined breast sonogram [Patient declined cervical cancer screening] : Patient declined cervical cancer screening [One fall no injury in past year] : Patient reported one fall in the past year without injury [Independent] : transferring/mobility [Completely Dependent] : Completely dependent. [Full assistance needed] : Assistance needed managing medications [FAST Score: ____] : Functional Assessment Scale (FAST) Score: [unfilled] [Smoke Detector] : smoke detector [Carbon Monoxide Detector] : carbon monoxide detector [Grab Bars] : grab bars [Night Light] : night light [Wears Seat Belt] : wears seat belt [Other reason not done] : Other reason not done [Moderate] : Stage: Moderate [Worse] : Status: Worse [Memory Lapses Or Loss] : worsened memory impairment [Patient Observed To Be Agitated] : denies agitation [Hostility Toward Caregivers] : denies aggression [Sleep Disturbances] : denies sleep disturbances [] : denies wandering [Fixed Beliefs Contradicted By Reality (Delusions)] : denies delusions [Difficulty Finding Desired Words] : denies difficulty finding desired words [Designated Healthcare Proxy] : Designated healthcare proxy [DNR] : DNR [DNI] : DNI [FreeTextEntry1] : TODAY patient reports feels well at this time and denies having any complaints however poor historian d/t baseline memory loss.   Justa reports pt was hospitalized in 12/23 for CP/SOB.  Meds adjusted. Started on Plavix and Protonix. Symptoms improved since then.   Referred for PT after hospitaliation but never started - family states they were never contacted.   BPS: mood is down  - Takes Mirtazapine   Appetite: decreased, wt has been stable recently   - Motor Syx: chronic back pain, sometimes off balance and "shaky" when walking.   Ambulates unassisted.  Last fall in 2/21 - when getting up to go to the bathroom in the middle of the night. No injury.   - Sleep:  ok  DEMENTIA / DEPRESSION / ANXIETY  - 11/21: Dx: Forgetfullness started approx 2016.  Sometimes forgets family member names.  Forgets her age, dates, forgets where placed glasses few minutes ago. Slow progression over past several years. Sometimes left alone at home but not for more than 1 hr.  NO longer cooks but used to.   - MOCA 11/ 30 w/ NP Gurwinder on 11/1/22  - MoCA 7/30 on 9/8/21 w/ neuro Dr. Robles  - MRI HEAD 10/26/21: b/l scattered small vessel white matter ischemic changes.   - Neuro Dr. Melita Robles - seen 9/6/23 - visit note appreciated in EMR   HFrEF / HTN / HLD / CAD s/p PCI w/ stent ~2013  - Follows w/ cards Dr. López - seen 12/19/23 - visit note appreciated in EMR  - on ASA, Plavix, statin, BB, Farxiga - On Entresto [TextBox_25] : No recent dexa. Decline to check BMD as would not change mgmt -would not wish start new med at this time as likely risk> benefit  [PapSmeardate] : 6/21 [TextBox_31] : had hearing loss but was told would not benefit from HAs d/t might "make her more confused"  [BoneDensityDate] : 3/22  [TextBox_37] : follows w/ optho Dr. Ca Camacho regularly  [FreeTextEntry7] : had [employer] Covid vaccine x 2, previously had flu vaccien without complications [Guns at Home] : no guns at home [Driving Concerns] : not driving or driving without noted concerns [[facility]] : >12  [de-identified] : PHQ2 of 2 on 11/8/22 , on antidepressant  [AdvancecareDate] : 7/31/23 [FreeTextEntry4] : HCP form on file: primary HCP is dtr Sarah, alternate is granddaughter Justa. . 6 children - Moshe Starks Eric, Kenrick, GLoria, Harvinder BUCIO on file - DNR, DNI, send to hospital when necessary, NFT, limited IVF, limited Abx, No dialysis.

## 2024-01-02 NOTE — ASSESSMENT
[FreeTextEntry1] : O2 Sat 98% after 6MWT today, no SOB noted on walking in the office today  - Family requests O2 supplementation, portable, however likely unnecessary at this time  - monitor  Will refer for home PT  Lab work ordered - f/u results  Wt recently stable - monitor daily Encourage/assist with PO intake  Discussed Remeron could be contributing to fatigue. Unclear if worsening depression or progression of dementia/chronic medical problems leading to depressed mood/appetite. Discussed r/b/a and decide to c/w same for now but will consider trial taper in future.  Fall precautions Tylenol for chronic LBP - no more than 3grams/day BP wnl today - c/w antihypertensives as per cards f/u with cards adv  - c/w 24/7 supervision for safety and assistance w/ ADLs  fu with WILEY Purdy for ADC program in 2/24 as scheduled   f/u with me in 3 mo, unless earlier PRN.

## 2024-01-02 NOTE — REASON FOR VISIT
[Post Hospitalization] : a post hospitalization visit [Family Member] : family member [FreeTextEntry1] : HFrEF, dementia [FreeTextEntry3] : granddaughter Justa  [FreeTextEntry2] : who assists with history d/t pt limited historian d/t baseline memory loss

## 2024-01-04 LAB
ALBUMIN SERPL ELPH-MCNC: 4.4 G/DL
ALP BLD-CCNC: 63 U/L
ALT SERPL-CCNC: 24 U/L
ANION GAP SERPL CALC-SCNC: 15 MMOL/L
AST SERPL-CCNC: 32 U/L
BASOPHILS # BLD AUTO: 0.05 K/UL
BASOPHILS NFR BLD AUTO: 0.9 %
BILIRUB SERPL-MCNC: 0.5 MG/DL
BUN SERPL-MCNC: 37 MG/DL
CALCIUM SERPL-MCNC: 9.5 MG/DL
CHLORIDE SERPL-SCNC: 103 MMOL/L
CO2 SERPL-SCNC: 27 MMOL/L
CREAT SERPL-MCNC: 2.05 MG/DL
EGFR: 23 ML/MIN/1.73M2
EOSINOPHIL # BLD AUTO: 0.1 K/UL
EOSINOPHIL NFR BLD AUTO: 1.8 %
GLUCOSE SERPL-MCNC: 88 MG/DL
HCT VFR BLD CALC: 38 %
HGB BLD-MCNC: 11.6 G/DL
IMM GRANULOCYTES NFR BLD AUTO: 0.4 %
LYMPHOCYTES # BLD AUTO: 1.36 K/UL
LYMPHOCYTES NFR BLD AUTO: 24 %
MAN DIFF?: NORMAL
MCHC RBC-ENTMCNC: 26.4 PG
MCHC RBC-ENTMCNC: 30.5 GM/DL
MCV RBC AUTO: 86.6 FL
MONOCYTES # BLD AUTO: 0.64 K/UL
MONOCYTES NFR BLD AUTO: 11.3 %
NEUTROPHILS # BLD AUTO: 3.49 K/UL
NEUTROPHILS NFR BLD AUTO: 61.6 %
NT-PROBNP SERPL-MCNC: 945 PG/ML
PLATELET # BLD AUTO: 277 K/UL
POTASSIUM SERPL-SCNC: 3.7 MMOL/L
PROT SERPL-MCNC: 7.5 G/DL
RBC # BLD: 4.39 M/UL
RBC # FLD: 12.9 %
SODIUM SERPL-SCNC: 145 MMOL/L
WBC # FLD AUTO: 5.66 K/UL

## 2024-02-05 ENCOUNTER — APPOINTMENT (OUTPATIENT)
Dept: GERIATRICS | Facility: CLINIC | Age: 89
End: 2024-02-05
Payer: MEDICARE

## 2024-02-05 VITALS
TEMPERATURE: 97.3 F | BODY MASS INDEX: 19.91 KG/M2 | SYSTOLIC BLOOD PRESSURE: 106 MMHG | WEIGHT: 116 LBS | HEART RATE: 76 BPM | RESPIRATION RATE: 15 BRPM | DIASTOLIC BLOOD PRESSURE: 62 MMHG

## 2024-02-05 VITALS — OXYGEN SATURATION: 99 %

## 2024-02-05 DIAGNOSIS — Z71.89 OTHER SPECIFIED COUNSELING: ICD-10-CM

## 2024-02-05 DIAGNOSIS — R26.0 ATAXIC GAIT: ICD-10-CM

## 2024-02-05 DIAGNOSIS — H61.23 IMPACTED CERUMEN, BILATERAL: ICD-10-CM

## 2024-02-05 PROCEDURE — 99215 OFFICE O/P EST HI 40 MIN: CPT

## 2024-02-05 PROCEDURE — G2211 COMPLEX E/M VISIT ADD ON: CPT

## 2024-02-05 RX ORDER — ASPIRIN 81 MG
6.5 TABLET, DELAYED RELEASE (ENTERIC COATED) ORAL
Qty: 1 | Refills: 0 | Status: ACTIVE | COMMUNITY
Start: 2024-02-05 | End: 1900-01-01

## 2024-02-05 NOTE — REVIEW OF SYSTEMS
[Feeling Poorly] : not feeling poorly [Feeling Tired] : not feeling tired [Sore Throat] : no sore throat [Chest Pain] : no chest pain [Palpitations] : no palpitations [Shortness Of Breath] : no shortness of breath [Cough] : no cough [SOB on Exertion] : no shortness of breath during exertion [Constipation] : no constipation [Diarrhea] : no diarrhea [Incontinence] : no incontinence [de-identified] : reported large circular scar on abdomen from childhood

## 2024-02-05 NOTE — PHYSICAL EXAM
[Alert] : alert [Well Nourished] : well nourished [Well Developed] : well developed [Sclera] : the sclera and conjunctiva were normal [Normal Oral Mucosa] : normal oral mucosa [No Oral Pallor] : no oral pallor [Normal Outer Ear/Nose] : the ears and nose were normal in appearance [Normal Appearance] : the appearance of the neck was normal [No Respiratory Distress] : no respiratory distress [No Acc Muscle Use] : no accessory muscle use [Respiration, Rhythm And Depth] : normal respiratory rhythm and effort [Auscultation Breath Sounds / Voice Sounds] : lungs were clear to auscultation bilaterally [Normal PMI] : the apical impulse was normal [Normal S1, S2] : normal S1 and S2 [Heart Rate And Rhythm] : heart rate was normal and rhythm regular [Edema] : edema was not present [Bowel Sounds] : normal bowel sounds [Abdomen Soft] : soft [Cervical Lymph Nodes Enlarged Posterior Bilaterally] : posterior cervical [Supraclavicular Lymph Nodes Enlarged Bilaterally] : supraclavicular [Cervical Lymph Nodes Enlarged Anterior Bilaterally] : anterior cervical, supraclavicular [Axillary Lymph Nodes Enlarged Bilaterally] : axillary [No CVA Tenderness] : no CVA  tenderness [No Spinal Tenderness] : no spinal tenderness [] : no rash [No Focal Deficits] : no focal deficits [Normal Affect] : the affect was normal [Normal Mood] : the mood was normal [de-identified] : elderly female, well dressed,quiet, smiled, A&O x1, hypophonic speech [FreeTextEntry1] : wearing glasses [de-identified] : cerumen impaction b/l  [de-identified] : midline abdomen scar noted on LUQ,LLQ, not painful to touch [de-identified] : ataxic gait [de-identified] : smiled

## 2024-02-05 NOTE — HISTORY OF PRESENT ILLNESS
[No falls in past year] : Patient reported no falls in the past year [Completely Independent] : Completely independent. [Patient is independent with] : bathing [Independent] : transferring/mobility [Full assistance needed] : Assistance needed managing medications [] : Assistance needed managing finances. [FAST Score: ____] : Functional Assessment Scale (FAST) Score: [unfilled] [Night Light] : night light [Anti-Slip Measures] : anti-slip measures [I have developed a follow-up plan documented below in the note.] : I have developed a follow-up plan documented below in the note. [Mild] : Stage: Mild [Worse] : Status: Worse [Reviewed no changes] : Reviewed, no changes [Designated Healthcare Proxy] : Designated healthcare proxy [Name: ___] : Health Care Proxy's Name: [unfilled]  [DNR] : DNR [DNI] : DNI [I will adhere to the patient's wishes.] : I will adhere to the patient's wishes. [FreeTextEntry1] : ADC Program ID:[id number]  Ms. BOO PRASAD is an 85 yo F with PMhx of dementia, CAD, HTN, HLD, gait instability presents for follow up visit  Alzheimer's and Dementia Program visit referred by MD Child for comanagement of dementia-related issues and coordination of dementia care.   Pt. presents granddaughter (Justa) who assisted with hx intake due to pt.'s dementia.   #dementia -just poor appetite, sad mood continues -she does not want to do her puzzles anymore or listen to mass "she use to love to do this" -reports pt. no longer interested in activities  -sleeping well  -no falls -has hospitalization for CP at [facility] 12/2023; d/c conservative management   #caregiver burden  -doing OK -Sarah's FMLA ran out, will explore reapplying   #ACP -MOLST in place -appreciated Dr. Child f/u 7/2023, wants to continue with conservative management   Plan  -discussed retrial sertraline w/ Dr. Child, agreed  -PT/OT at home -continue GOC, open to hospice near future when pt. meets criteria   ADC acuity RED, trial sertraline, f/u in 1 month.    (10/2/23) #dementia -appreciated Dr. Robles neuro f/u 9/6/23;neurodegenerative dementia +/- vascular dementia, PET scan 5/2023 c/w late +/- FTD, moderate stage  -reports sleeping well, on mirtazipine 7.5mg  -reports behaviors stable -she is back to doing daily puzzles -eating well  -denies fall/acute visits -got medical alert bracelet  -pt. supervised 24/7 -family is in the process of applying for medicaid   #caregiver burden  -reports doing well -needs help with completing FMLA forms -needs to work another 80 hours until she qualifies, will provide forms    #HF -appreciated Dr. Sherman EP cardiologist note 5/11/23;severe cardiomyopathy LV systolic function 15-20%,complete LBBB, 2 admissions for acute on chronic HF NYHA class III -restarted on entresto,tolerating well -on 800ml fluid restriction and daily weights, stable reports   #ACP -MOLST in place -appreciated Dr. Child f/u 7/2023, wants to continue with conservative management  Plan  -flu vaccine today  -PT/OT rx  -ensure rx -letter for daughter  -f/u 4m   ADC acuity GREEN, f/u in 4m, PRN.  *CAREGIVER 1:  Name/Relationship: Sarah Carrasco,daughter Involvement in care: Mailing Address:[address] [place] [address] Phone Number:[phone] E-mail: Best time to reach this person: evening Patient permission to contact this person:yes  CAREGIVER 2:  Name/Relationship:Justa Hebert,granddaughter Involvement in care:lives with pt. Mailing Address: Phone Number:[phone] E-mail: Best time to reach this person:  Patient permission to contact this person:yes  Primary Care Physician:Dr. Child Physicians: Neurologists:Dr. Melita Robles  fax [phone] ENT: Dr. Sae Talamantes, advised hearing aids, not gotten them     Suspect Medications (associated with mental status changes): no Recent hospitalization/ ED Visits/ Nursing Home Stays:no  Social History: Primary Language:English  Marital Status: Children:6 kids, main caretakers, 2 daughters here in [place]. Sons are in [place] and [place].  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[place], [employer] Activity/Exercise:no exercise  Alcohol:no  Sexually active: no Driving Habits:no  Firearms:no  Living Situation: Housing (stairs/levels): single family, multi story 3 floors  Length at Residence:a few months Lives with:daughter and granddaughter Caregivers (non-paid and paid): Safety Concerns: none  Wandering:no, no cameras Falls:last fall in 2/2021, no injury, back pain  Fear of Falling: no  Financial Situation: "SS that's it" -difficult time with finances   Advance Directives: HCP/Advance Directives/Living will: Sarah Carrasco,daughter [phone] HCP/Alternate Decision Maker:Justa BUCIO: would like us to allow natural death "yes, when the time comes" stated pt.  What matters most? "my health"  - Justa stated "she would want to go back to her home" -introduced MOLST form  -introduced hospice  -pending call from ANTONIETA Nieto, and ACP videos   [Grab Bars] : no grab bars [Shower Chair] : no shower chair [Driving Concerns] : not driving or driving without noted concerns [de-identified] : 6 [de-identified] : dials for her [de-identified] : 0 [de-identified] : none, needs assistive devices  [de-identified] : counseled on grab bars  [CornellScale] : 6 11/1/22 [FreeTextEntry] : 5 [de-identified] : 2 [NPI-QTotalScore] : 7 [de-identified] : anxiety worst behaviors, redirectable [AdvancecareDate] : 2/5/24 [FreeTextEntry4] : MOLST in place, see scanned document  -continue GOC, open to hospice near future when pt. meets criteria

## 2024-02-05 NOTE — ASSESSMENT
[FreeTextEntry1] : Social Care Plan 1. Respite:   -Provided hotline contact to Alzheimer's Association [phone]. -provided [facility] contact to assist with resources and caregiver support.  -Provided link to www. communityresourcefinder.org -provided [facility] agency  -ANTONIETA Nieto    2. Caregiver Education: Sent e-mail to -  I wanted to share useful tools to help manage your family member's dementia behaviors. I have included caregiver training videos from [facility] Alzheimer's and Dementia Care Program to help guide you and caretakers, as well as a daily care plan from the Alzheimer's Association. Maintaining a schedule and a structured day helps patients with dementia. I am available if you need any further assistance or have any questions. See below: 1.Agitation & Anxiety:  https://Hachimenroppiu.be/hahvUXwTXE4 2. Safety 3. Alzheimer's Association Daily Care Plan:  https://www.alz.org/help-support/caregiving/daily-care/daily-care-plan 4. Educated daughter  behaviors occur to acknowledge patient's emotions and redirect behavior with distractions, address physical needs. Provided examples. Provided examples. Daughter agreed to try this non-pharmacological approach.     Look through books, picture albums, coloring books, painting, puzzles, play with ball/balloon, listen to music, stress ball, fold small items.  3. Alzheimer's Association Community Resource Finder     www.alz.org/nca/helping you/community-resource-finder    4. Caregiver support: -contact to    4. Safety:  -Pt. is accompanied 24/7 and reports safety concerns are not an issue at the moment.  Continue will fall safety precautions.   5. Caregiver stress: -.Counseled on non-pharmacological interventions for stress which include medication saritha (Calm), daily physical activity.  6. PT with [facility] 7. [facility] education folder attached PFD.     -available for urgent visits and as needed by phone. -[facility] acuity RED, trial sertraline, f/u in 4 weeks   Mary Purdy, MAXIM-BC

## 2024-02-20 ENCOUNTER — APPOINTMENT (OUTPATIENT)
Dept: CARDIOLOGY | Facility: CLINIC | Age: 89
End: 2024-02-20
Payer: MEDICARE

## 2024-02-20 ENCOUNTER — NON-APPOINTMENT (OUTPATIENT)
Age: 89
End: 2024-02-20

## 2024-02-20 VITALS
BODY MASS INDEX: 19.57 KG/M2 | DIASTOLIC BLOOD PRESSURE: 53 MMHG | WEIGHT: 114 LBS | SYSTOLIC BLOOD PRESSURE: 89 MMHG | OXYGEN SATURATION: 96 % | HEART RATE: 55 BPM

## 2024-02-20 VITALS — SYSTOLIC BLOOD PRESSURE: 102 MMHG | DIASTOLIC BLOOD PRESSURE: 60 MMHG

## 2024-02-20 DIAGNOSIS — E78.5 HYPERLIPIDEMIA, UNSPECIFIED: ICD-10-CM

## 2024-02-20 PROCEDURE — 93000 ELECTROCARDIOGRAM COMPLETE: CPT

## 2024-02-20 PROCEDURE — 99214 OFFICE O/P EST MOD 30 MIN: CPT

## 2024-02-20 PROCEDURE — G2211 COMPLEX E/M VISIT ADD ON: CPT

## 2024-02-22 PROBLEM — E78.5 HLD (HYPERLIPIDEMIA): Status: ACTIVE | Noted: 2021-11-01

## 2024-02-22 NOTE — DISCUSSION/SUMMARY
[EKG obtained to assist in diagnosis and management of assessed problem(s)] : EKG obtained to assist in diagnosis and management of assessed problem(s) [FreeTextEntry1] : Patient is an 88 year-old Black woman with multiple cardiovascular risk factors and known coronary artery disease, presents for evaluation of a LBBB seen on ECG. She has no acute cardiac complaints. She has dementia that is mild, but progressive.   Continue ASA and statin therapy for patient with known coronary artery disease status post PCI.  For patient with severely reduced LVEF, continue beta-blocker and SGLT-2 inhibitor.  Recommend continuing low dose Entresto. Referred to EP for consideration of CRT-P for patient with LBBB and severely reduced LVEF. Patient and her family have chosen to defer elective PPM implant at this time.

## 2024-02-22 NOTE — HISTORY OF PRESENT ILLNESS
[FreeTextEntry1] : Patient is an 88 year-old Black woman who has recently moved and is changing doctors. She has a known past medical history of hypertension, dyslipidemia, coronary artery disease status post PCI (2013), with known dementia, who was recently noted to have a LBBB on her ECG. She has no new cardiovascular complaints.  She continues walking and dancing for exercise.   February 2023 - Patient was in [place] in December and January. While she was there, she had chest pain and went to the hospital where she was told she had a heart attack. She was given clopidogrel 75 mg and sildenafil 50 mg as new prescriptions, but they have not been continued. She is currently maintained on sertraline 25 mg daily, ASA 81 mg daily, atorvastatin 20 mg daily, and metoprolol succinate 25 mg daily.  April 2023 - Patient returns today for follow-up after recent hospitalization for chest discomfort. She was seen here by Dr. Egan on 3/24/2023 and started on Entresto 24-26 mg BID. One week later, she was admitted to [facility] with chest pain that improved with diuresis. There was no evidence of acute coronary syndrome. She has felt well since discharge. Of note, she was discharged off her Entresto because of relative hypotension.   July 2023 - Patient returns today for follow-up. Her weight has been up by approximately 2-3 lbs this week. She was given more diuretics, but then labs suggested she was dehydrated and she reduced her dosing for the rest of the week. Repeat labs showed interval improvement in renal function, but creatinine remained above her baseline. She reported easy fatigability, but no shortness of breath or notable edema.   July 20, 2023 - She returns today for follow-up two weeks after her last visit. She has continued to lose weight. She is now 122 lbs on her home scale. She has been taking Ensure nutritional supplements.   December 2023- TeleHealth Video Encounter Initiated by: Patient election for TeleHealth visit Patient was consented for TeleHealth visit Patient Location: Home with her granddaughterJusta Physician Location: Office ([address], [address], [place], [place], [address]) Duration of Encounter: 30 minutes, at least 50% of which was spent in direct counseling and coordination of care.   She was hospitalized from 12/11 - 12/12/2023 because of chest pain and dyspnea on exertion.

## 2024-02-22 NOTE — CARDIOLOGY SUMMARY
[de-identified] : 3/14/2022, sinus rhythm, 68 bpm, LBBB 5/18/2022, sinus rhythm, 69 bpm, LBBB 12/18/2023, sinus rhythm at 75 bpm, PVC x1, LBBB [de-identified] : 1/9/2023 in [place] - moderate MR, moderate-severe TR, pulmonary hypertension, septal and lateral wall motion abnormality, LVEF 35% 3/3/2023, dilated LA, severe pulmonary hypertension, PASP 64 mmHg, severe global LV dysfunction, LVEF 15-20% 12/12/2023, normal LA, moderate pulmonary pressures, PASP 48 mmHg, severe global LV dysfunction,, LVEF 25%

## 2024-02-29 ENCOUNTER — LABORATORY RESULT (OUTPATIENT)
Age: 89
End: 2024-02-29

## 2024-02-29 ENCOUNTER — APPOINTMENT (OUTPATIENT)
Dept: GERIATRICS | Facility: CLINIC | Age: 89
End: 2024-02-29
Payer: MEDICARE

## 2024-02-29 ENCOUNTER — NON-APPOINTMENT (OUTPATIENT)
Age: 89
End: 2024-02-29

## 2024-02-29 VITALS
TEMPERATURE: 97.3 F | BODY MASS INDEX: 19.81 KG/M2 | HEART RATE: 53 BPM | WEIGHT: 116 LBS | HEIGHT: 64 IN | OXYGEN SATURATION: 96 % | SYSTOLIC BLOOD PRESSURE: 73 MMHG | DIASTOLIC BLOOD PRESSURE: 48 MMHG

## 2024-02-29 DIAGNOSIS — F41.8 OTHER SPECIFIED ANXIETY DISORDERS: ICD-10-CM

## 2024-02-29 DIAGNOSIS — R03.1 NONSPECIFIC LOW BLOOD-PRESSURE READING: ICD-10-CM

## 2024-02-29 DIAGNOSIS — Z13.29 ENCOUNTER FOR SCREENING FOR OTHER SUSPECTED ENDOCRINE DISORDER: ICD-10-CM

## 2024-02-29 DIAGNOSIS — R63.4 ABNORMAL WEIGHT LOSS: ICD-10-CM

## 2024-02-29 PROCEDURE — 99215 OFFICE O/P EST HI 40 MIN: CPT

## 2024-03-01 ENCOUNTER — INPATIENT (INPATIENT)
Facility: HOSPITAL | Age: 89
LOS: 3 days | Discharge: HOME HEALTH | End: 2024-03-05
Attending: HOSPITALIST | Admitting: HOSPITALIST
Payer: MEDICARE

## 2024-03-01 VITALS
TEMPERATURE: 98 F | DIASTOLIC BLOOD PRESSURE: 61 MMHG | HEART RATE: 77 BPM | SYSTOLIC BLOOD PRESSURE: 96 MMHG | OXYGEN SATURATION: 98 % | RESPIRATION RATE: 16 BRPM | WEIGHT: 113.98 LBS

## 2024-03-01 DIAGNOSIS — N17.9 ACUTE KIDNEY FAILURE, UNSPECIFIED: ICD-10-CM

## 2024-03-01 DIAGNOSIS — E78.5 HYPERLIPIDEMIA, UNSPECIFIED: ICD-10-CM

## 2024-03-01 DIAGNOSIS — Z51.5 ENCOUNTER FOR PALLIATIVE CARE: ICD-10-CM

## 2024-03-01 DIAGNOSIS — E87.0 HYPEROSMOLALITY AND HYPERNATREMIA: ICD-10-CM

## 2024-03-01 DIAGNOSIS — F03.90 UNSPECIFIED DEMENTIA WITHOUT BEHAVIORAL DISTURBANCE: ICD-10-CM

## 2024-03-01 DIAGNOSIS — G93.49 OTHER ENCEPHALOPATHY: ICD-10-CM

## 2024-03-01 DIAGNOSIS — R74.01 ELEVATION OF LEVELS OF LIVER TRANSAMINASE LEVELS: ICD-10-CM

## 2024-03-01 DIAGNOSIS — I50.20 UNSPECIFIED SYSTOLIC (CONGESTIVE) HEART FAILURE: ICD-10-CM

## 2024-03-01 DIAGNOSIS — Z29.9 ENCOUNTER FOR PROPHYLACTIC MEASURES, UNSPECIFIED: ICD-10-CM

## 2024-03-01 DIAGNOSIS — Z71.89 OTHER SPECIFIED COUNSELING: ICD-10-CM

## 2024-03-01 DIAGNOSIS — I10 ESSENTIAL (PRIMARY) HYPERTENSION: ICD-10-CM

## 2024-03-01 DIAGNOSIS — Z95.5 PRESENCE OF CORONARY ANGIOPLASTY IMPLANT AND GRAFT: Chronic | ICD-10-CM

## 2024-03-01 DIAGNOSIS — R53.81 OTHER MALAISE: ICD-10-CM

## 2024-03-01 DIAGNOSIS — G93.41 METABOLIC ENCEPHALOPATHY: ICD-10-CM

## 2024-03-01 DIAGNOSIS — R62.7 ADULT FAILURE TO THRIVE: ICD-10-CM

## 2024-03-01 LAB
ALBUMIN SERPL ELPH-MCNC: 3.8 G/DL — SIGNIFICANT CHANGE UP (ref 3.3–5)
ALBUMIN SERPL ELPH-MCNC: 4.4 G/DL
ALP BLD-CCNC: 79 U/L
ALP SERPL-CCNC: 69 U/L — SIGNIFICANT CHANGE UP (ref 40–120)
ALT FLD-CCNC: 78 U/L — HIGH (ref 4–33)
ALT SERPL-CCNC: 106 U/L
ANION GAP SERPL CALC-SCNC: 14 MMOL/L — SIGNIFICANT CHANGE UP (ref 7–14)
ANION GAP SERPL CALC-SCNC: 14 MMOL/L — SIGNIFICANT CHANGE UP (ref 7–14)
ANION GAP SERPL CALC-SCNC: 22 MMOL/L
APPEARANCE UR: CLEAR — SIGNIFICANT CHANGE UP
APPEARANCE: CLEAR
AST SERPL-CCNC: 101 U/L
AST SERPL-CCNC: 54 U/L — HIGH (ref 4–32)
BACTERIA # UR AUTO: NEGATIVE /HPF — SIGNIFICANT CHANGE UP
BASE EXCESS BLDV CALC-SCNC: 0.8 MMOL/L — SIGNIFICANT CHANGE UP (ref -2–3)
BASOPHILS # BLD AUTO: 0.03 K/UL — SIGNIFICANT CHANGE UP (ref 0–0.2)
BASOPHILS # BLD AUTO: 0.04 K/UL
BASOPHILS NFR BLD AUTO: 0.5 % — SIGNIFICANT CHANGE UP (ref 0–2)
BASOPHILS NFR BLD AUTO: 0.6 %
BILIRUB SERPL-MCNC: 0.3 MG/DL — SIGNIFICANT CHANGE UP (ref 0.2–1.2)
BILIRUB SERPL-MCNC: 0.4 MG/DL
BILIRUB UR-MCNC: NEGATIVE — SIGNIFICANT CHANGE UP
BILIRUBIN URINE: NEGATIVE
BLOOD URINE: NEGATIVE
BUN SERPL-MCNC: 77 MG/DL — HIGH (ref 7–23)
BUN SERPL-MCNC: 84 MG/DL — HIGH (ref 7–23)
BUN SERPL-MCNC: 86 MG/DL
CA-I SERPL-SCNC: 1.29 MMOL/L — SIGNIFICANT CHANGE UP (ref 1.15–1.33)
CALCIUM SERPL-MCNC: 8.9 MG/DL — SIGNIFICANT CHANGE UP (ref 8.4–10.5)
CALCIUM SERPL-MCNC: 9.4 MG/DL — SIGNIFICANT CHANGE UP (ref 8.4–10.5)
CALCIUM SERPL-MCNC: 9.6 MG/DL
CAST: 14 /LPF — HIGH (ref 0–4)
CHLORIDE BLDV-SCNC: 119 MMOL/L — HIGH (ref 96–108)
CHLORIDE SERPL-SCNC: 118 MMOL/L
CHLORIDE SERPL-SCNC: 118 MMOL/L — HIGH (ref 98–107)
CHLORIDE SERPL-SCNC: 118 MMOL/L — HIGH (ref 98–107)
CO2 BLDV-SCNC: 29.5 MMOL/L — HIGH (ref 22–26)
CO2 SERPL-SCNC: 21 MMOL/L
CO2 SERPL-SCNC: 22 MMOL/L — SIGNIFICANT CHANGE UP (ref 22–31)
CO2 SERPL-SCNC: 24 MMOL/L — SIGNIFICANT CHANGE UP (ref 22–31)
COLOR SPEC: YELLOW — SIGNIFICANT CHANGE UP
COLOR: YELLOW
CREAT SERPL-MCNC: 2.48 MG/DL — HIGH (ref 0.5–1.3)
CREAT SERPL-MCNC: 2.71 MG/DL
CREAT SERPL-MCNC: 2.92 MG/DL — HIGH (ref 0.5–1.3)
DIFF PNL FLD: NEGATIVE — SIGNIFICANT CHANGE UP
EGFR: 15 ML/MIN/1.73M2 — LOW
EGFR: 16 ML/MIN/1.73M2
EGFR: 18 ML/MIN/1.73M2 — LOW
EOSINOPHIL # BLD AUTO: 0.04 K/UL
EOSINOPHIL # BLD AUTO: 0.06 K/UL — SIGNIFICANT CHANGE UP (ref 0–0.5)
EOSINOPHIL NFR BLD AUTO: 0.6 %
EOSINOPHIL NFR BLD AUTO: 1.1 % — SIGNIFICANT CHANGE UP (ref 0–6)
FLUAV AG NPH QL: SIGNIFICANT CHANGE UP
FLUBV AG NPH QL: SIGNIFICANT CHANGE UP
GAS PNL BLDV: 153 MMOL/L — HIGH (ref 136–145)
GAS PNL BLDV: SIGNIFICANT CHANGE UP
GAS PNL BLDV: SIGNIFICANT CHANGE UP
GLUCOSE BLDV-MCNC: 125 MG/DL — HIGH (ref 70–99)
GLUCOSE QUALITATIVE U: 250 MG/DL
GLUCOSE SERPL-MCNC: 100 MG/DL — HIGH (ref 70–99)
GLUCOSE SERPL-MCNC: 124 MG/DL — HIGH (ref 70–99)
GLUCOSE SERPL-MCNC: 128 MG/DL
GLUCOSE UR QL: NEGATIVE MG/DL — SIGNIFICANT CHANGE UP
HCO3 BLDV-SCNC: 28 MMOL/L — SIGNIFICANT CHANGE UP (ref 22–29)
HCT VFR BLD CALC: 39.7 % — SIGNIFICANT CHANGE UP (ref 34.5–45)
HCT VFR BLD CALC: 42.8 %
HCT VFR BLDA CALC: 37 % — SIGNIFICANT CHANGE UP (ref 34.5–46.5)
HGB BLD CALC-MCNC: 12.3 G/DL — SIGNIFICANT CHANGE UP (ref 11.7–16.1)
HGB BLD-MCNC: 12 G/DL — SIGNIFICANT CHANGE UP (ref 11.5–15.5)
HGB BLD-MCNC: 12.6 G/DL
HYALINE CASTS # UR AUTO: PRESENT
IANC: 3.28 K/UL — SIGNIFICANT CHANGE UP (ref 1.8–7.4)
IMM GRANULOCYTES NFR BLD AUTO: 0.3 %
IMM GRANULOCYTES NFR BLD AUTO: 0.4 % — SIGNIFICANT CHANGE UP (ref 0–0.9)
KETONES UR-MCNC: NEGATIVE MG/DL — SIGNIFICANT CHANGE UP
KETONES URINE: NEGATIVE MG/DL
LACTATE BLDV-MCNC: 1.8 MMOL/L — SIGNIFICANT CHANGE UP (ref 0.5–2)
LEUKOCYTE ESTERASE UR-ACNC: ABNORMAL
LEUKOCYTE ESTERASE URINE: ABNORMAL
LYMPHOCYTES # BLD AUTO: 1.74 K/UL — SIGNIFICANT CHANGE UP (ref 1–3.3)
LYMPHOCYTES # BLD AUTO: 1.83 K/UL
LYMPHOCYTES # BLD AUTO: 31.1 % — SIGNIFICANT CHANGE UP (ref 13–44)
LYMPHOCYTES NFR BLD AUTO: 28.5 %
MAGNESIUM SERPL-MCNC: 2.5 MG/DL — SIGNIFICANT CHANGE UP (ref 1.6–2.6)
MAN DIFF?: NORMAL
MCHC RBC-ENTMCNC: 26.5 PG
MCHC RBC-ENTMCNC: 26.7 PG — LOW (ref 27–34)
MCHC RBC-ENTMCNC: 29.4 GM/DL
MCHC RBC-ENTMCNC: 30.2 GM/DL — LOW (ref 32–36)
MCV RBC AUTO: 88.4 FL — SIGNIFICANT CHANGE UP (ref 80–100)
MCV RBC AUTO: 89.9 FL
MONOCYTES # BLD AUTO: 0.46 K/UL — SIGNIFICANT CHANGE UP (ref 0–0.9)
MONOCYTES # BLD AUTO: 0.47 K/UL
MONOCYTES NFR BLD AUTO: 7.3 %
MONOCYTES NFR BLD AUTO: 8.2 % — SIGNIFICANT CHANGE UP (ref 2–14)
NEUTROPHILS # BLD AUTO: 3.28 K/UL — SIGNIFICANT CHANGE UP (ref 1.8–7.4)
NEUTROPHILS # BLD AUTO: 4.01 K/UL
NEUTROPHILS NFR BLD AUTO: 58.7 % — SIGNIFICANT CHANGE UP (ref 43–77)
NEUTROPHILS NFR BLD AUTO: 62.7 %
NITRITE UR-MCNC: NEGATIVE — SIGNIFICANT CHANGE UP
NITRITE URINE: NEGATIVE
NRBC # BLD: 0 /100 WBCS — SIGNIFICANT CHANGE UP (ref 0–0)
NRBC # FLD: 0 K/UL — SIGNIFICANT CHANGE UP (ref 0–0)
NT-PROBNP SERPL-MCNC: 557 PG/ML
PCO2 BLDV: 54 MMHG — HIGH (ref 39–52)
PH BLDV: 7.32 — SIGNIFICANT CHANGE UP (ref 7.32–7.43)
PH UR: 5.5 — SIGNIFICANT CHANGE UP (ref 5–8)
PH URINE: 6
PHOSPHATE SERPL-MCNC: 3.3 MG/DL — SIGNIFICANT CHANGE UP (ref 2.5–4.5)
PLATELET # BLD AUTO: 145 K/UL — LOW (ref 150–400)
PLATELET # BLD AUTO: 173 K/UL
PO2 BLDV: 44 MMHG — SIGNIFICANT CHANGE UP (ref 25–45)
POTASSIUM BLDV-SCNC: 3.6 MMOL/L — SIGNIFICANT CHANGE UP (ref 3.5–5.1)
POTASSIUM SERPL-MCNC: 3.8 MMOL/L — SIGNIFICANT CHANGE UP (ref 3.5–5.3)
POTASSIUM SERPL-MCNC: 3.8 MMOL/L — SIGNIFICANT CHANGE UP (ref 3.5–5.3)
POTASSIUM SERPL-SCNC: 3.8 MMOL/L — SIGNIFICANT CHANGE UP (ref 3.5–5.3)
POTASSIUM SERPL-SCNC: 3.8 MMOL/L — SIGNIFICANT CHANGE UP (ref 3.5–5.3)
POTASSIUM SERPL-SCNC: 3.9 MMOL/L
PROT SERPL-MCNC: 7.4 G/DL — SIGNIFICANT CHANGE UP (ref 6–8.3)
PROT SERPL-MCNC: 7.9 G/DL
PROT UR-MCNC: NEGATIVE MG/DL — SIGNIFICANT CHANGE UP
PROTEIN URINE: NORMAL MG/DL
RBC # BLD: 4.49 M/UL — SIGNIFICANT CHANGE UP (ref 3.8–5.2)
RBC # BLD: 4.76 M/UL
RBC # FLD: 14.6 % — HIGH (ref 10.3–14.5)
RBC # FLD: 14.7 %
RBC CASTS # UR COMP ASSIST: 2 /HPF — SIGNIFICANT CHANGE UP (ref 0–4)
REVIEW: SIGNIFICANT CHANGE UP
RSV RNA NPH QL NAA+NON-PROBE: SIGNIFICANT CHANGE UP
SAO2 % BLDV: 69.8 % — SIGNIFICANT CHANGE UP (ref 67–88)
SARS-COV-2 RNA SPEC QL NAA+PROBE: SIGNIFICANT CHANGE UP
SODIUM SERPL-SCNC: 154 MMOL/L — HIGH (ref 135–145)
SODIUM SERPL-SCNC: 156 MMOL/L — HIGH (ref 135–145)
SODIUM SERPL-SCNC: 160 MMOL/L
SP GR SPEC: 1.02 — SIGNIFICANT CHANGE UP (ref 1–1.03)
SPECIFIC GRAVITY URINE: 1.02
SQUAMOUS # UR AUTO: 2 /HPF — SIGNIFICANT CHANGE UP (ref 0–5)
TSH SERPL-ACNC: 2.85 UIU/ML
UROBILINOGEN FLD QL: 0.2 MG/DL — SIGNIFICANT CHANGE UP (ref 0.2–1)
UROBILINOGEN URINE: 0.2 MG/DL
WBC # BLD: 5.59 K/UL — SIGNIFICANT CHANGE UP (ref 3.8–10.5)
WBC # FLD AUTO: 5.59 K/UL — SIGNIFICANT CHANGE UP (ref 3.8–10.5)
WBC # FLD AUTO: 6.41 K/UL
WBC UR QL: 2 /HPF — SIGNIFICANT CHANGE UP (ref 0–5)

## 2024-03-01 PROCEDURE — 99497 ADVNCD CARE PLAN 30 MIN: CPT | Mod: 25

## 2024-03-01 PROCEDURE — 99223 1ST HOSP IP/OBS HIGH 75: CPT

## 2024-03-01 PROCEDURE — 99291 CRITICAL CARE FIRST HOUR: CPT

## 2024-03-01 PROCEDURE — 71045 X-RAY EXAM CHEST 1 VIEW: CPT | Mod: 26

## 2024-03-01 RX ORDER — SACUBITRIL AND VALSARTAN 24; 26 MG/1; MG/1
1 TABLET, FILM COATED ORAL
Refills: 0 | Status: DISCONTINUED | OUTPATIENT
Start: 2024-03-01 | End: 2024-03-02

## 2024-03-01 RX ORDER — SENNA PLUS 8.6 MG/1
2 TABLET ORAL AT BEDTIME
Refills: 0 | Status: DISCONTINUED | OUTPATIENT
Start: 2024-03-01 | End: 2024-03-05

## 2024-03-01 RX ORDER — SERTRALINE 25 MG/1
25 TABLET, FILM COATED ORAL DAILY
Refills: 0 | Status: DISCONTINUED | OUTPATIENT
Start: 2024-03-01 | End: 2024-03-05

## 2024-03-01 RX ORDER — CLOPIDOGREL BISULFATE 75 MG/1
75 TABLET, FILM COATED ORAL DAILY
Refills: 0 | Status: DISCONTINUED | OUTPATIENT
Start: 2024-03-01 | End: 2024-03-05

## 2024-03-01 RX ORDER — MIRTAZAPINE 45 MG/1
7.5 TABLET, ORALLY DISINTEGRATING ORAL DAILY
Refills: 0 | Status: DISCONTINUED | OUTPATIENT
Start: 2024-03-01 | End: 2024-03-05

## 2024-03-01 RX ORDER — CHLORHEXIDINE GLUCONATE 213 G/1000ML
1 SOLUTION TOPICAL DAILY
Refills: 0 | Status: DISCONTINUED | OUTPATIENT
Start: 2024-03-01 | End: 2024-03-05

## 2024-03-01 RX ORDER — ATORVASTATIN CALCIUM 80 MG/1
1 TABLET, FILM COATED ORAL
Refills: 0 | DISCHARGE

## 2024-03-01 RX ORDER — ASPIRIN/CALCIUM CARB/MAGNESIUM 324 MG
81 TABLET ORAL DAILY
Refills: 0 | Status: DISCONTINUED | OUTPATIENT
Start: 2024-03-01 | End: 2024-03-05

## 2024-03-01 RX ORDER — SODIUM CHLORIDE 9 MG/ML
1000 INJECTION INTRAMUSCULAR; INTRAVENOUS; SUBCUTANEOUS ONCE
Refills: 0 | Status: COMPLETED | OUTPATIENT
Start: 2024-03-01 | End: 2024-03-01

## 2024-03-01 RX ORDER — SERTRALINE 25 MG/1
1 TABLET, FILM COATED ORAL
Refills: 0 | DISCHARGE

## 2024-03-01 RX ORDER — PANTOPRAZOLE SODIUM 20 MG/1
40 TABLET, DELAYED RELEASE ORAL
Refills: 0 | Status: DISCONTINUED | OUTPATIENT
Start: 2024-03-01 | End: 2024-03-05

## 2024-03-01 RX ORDER — DAPAGLIFLOZIN 10 MG/1
10 TABLET, FILM COATED ORAL DAILY
Refills: 0 | Status: DISCONTINUED | OUTPATIENT
Start: 2024-03-01 | End: 2024-03-05

## 2024-03-01 RX ORDER — POLYETHYLENE GLYCOL 3350 17 G/17G
17 POWDER, FOR SOLUTION ORAL ONCE
Refills: 0 | Status: COMPLETED | OUTPATIENT
Start: 2024-03-01 | End: 2024-03-01

## 2024-03-01 RX ORDER — DAPAGLIFLOZIN 10 MG/1
1 TABLET, FILM COATED ORAL
Refills: 0 | DISCHARGE

## 2024-03-01 RX ORDER — ASPIRIN/CALCIUM CARB/MAGNESIUM 324 MG
1 TABLET ORAL
Refills: 0 | DISCHARGE

## 2024-03-01 RX ORDER — SODIUM CHLORIDE 9 MG/ML
1000 INJECTION, SOLUTION INTRAVENOUS
Refills: 0 | Status: DISCONTINUED | OUTPATIENT
Start: 2024-03-01 | End: 2024-03-02

## 2024-03-01 RX ORDER — MIRTAZAPINE 45 MG/1
1 TABLET, ORALLY DISINTEGRATING ORAL
Refills: 0 | DISCHARGE

## 2024-03-01 RX ORDER — ATORVASTATIN CALCIUM 80 MG/1
20 TABLET, FILM COATED ORAL AT BEDTIME
Refills: 0 | Status: DISCONTINUED | OUTPATIENT
Start: 2024-03-01 | End: 2024-03-01

## 2024-03-01 RX ADMIN — CLOPIDOGREL BISULFATE 75 MILLIGRAM(S): 75 TABLET, FILM COATED ORAL at 20:10

## 2024-03-01 RX ADMIN — SODIUM CHLORIDE 50 MILLILITER(S): 9 INJECTION, SOLUTION INTRAVENOUS at 20:05

## 2024-03-01 RX ADMIN — CHLORHEXIDINE GLUCONATE 1 APPLICATION(S): 213 SOLUTION TOPICAL at 18:18

## 2024-03-01 RX ADMIN — MIRTAZAPINE 7.5 MILLIGRAM(S): 45 TABLET, ORALLY DISINTEGRATING ORAL at 20:10

## 2024-03-01 RX ADMIN — Medication 81 MILLIGRAM(S): at 20:10

## 2024-03-01 RX ADMIN — POLYETHYLENE GLYCOL 3350 17 GRAM(S): 17 POWDER, FOR SOLUTION ORAL at 20:09

## 2024-03-01 RX ADMIN — SODIUM CHLORIDE 1000 MILLILITER(S): 9 INJECTION INTRAMUSCULAR; INTRAVENOUS; SUBCUTANEOUS at 11:50

## 2024-03-01 NOTE — H&P ADULT - PROBLEM SELECTOR PLAN 9
Diet: Soft and Bite sized   DVT prophylaxis: Heparin 5000U subq   Dispo: Pending resolution   Code: DNR/DNI

## 2024-03-01 NOTE — ED PROVIDER NOTE - PHYSICAL EXAMINATION
GENERAL: no acute distress, non-toxic appearing  HEAD: normocephalic, atraumatic  HEENT: normal conjunctiva, oral mucosa moist, neck supple  CARDIAC: regular rate and rhythm, normal S1 and S2,  no appreciable murmurs  PULM: clear to ascultation bilaterally, no crackles, rales, rhonchi, or wheezing  GI: abdomen nondistended, soft, nontender, no guarding or rebound tenderness  NEURO: alert and oriented x 1, normal speech, moving all extremities   MSK: no visible deformities, no peripheral edema, calf tenderness/redness/swelling  SKIN: no visible rashes, dry, well-perfused  PSYCH: appropriate mood and affect

## 2024-03-01 NOTE — H&P ADULT - TIME BILLING
reviewing laboratory data, consultants' recommendations, documentation in [place], performing medically appropriate examinations/evaluations, discussion with patient/family/RN/ACP/Residents and interdisciplinary staff (such as , social workers, etc), counseling patient/family/care giver, ordering medically appropriate medication, tests, or procedures

## 2024-03-01 NOTE — H&P ADULT - PROBLEM SELECTOR PROBLEM 4
Need for prophylactic measure HLD (hyperlipidemia) HFrEF (heart failure with reduced ejection fraction) Adult failure to thrive

## 2024-03-01 NOTE — H&P ADULT - NSHPLABSRESULTS_GEN_ALL_CORE
LABS:                         12.0   5.59  )-----------( 145      ( 01 Mar 2024 10:40 )             39.7     03-    156<H>  |  118<H>  |  84<H>  ----------------------------<  124<H>  3.8   |  24  |  2.92<H>    Ca    9.4      01 Mar 2024 10:40    TPro  7.4  /  Alb  3.8  /  TBili  0.3  /  DBili  x   /  AST  54<H>  /  ALT  78<H>  /  AlkPhos  69  03-01      Urinalysis Basic - ( 01 Mar 2024 10:40 )    Color: Yellow / Appearance: Clear / S.019 / pH: x  Gluc: 124 mg/dL / Ketone: Negative mg/dL  / Bili: Negative / Urobili: 0.2 mg/dL   Blood: x / Protein: Negative mg/dL / Nitrite: Negative   Leuk Esterase: Trace / RBC: 2 /HPF / WBC 2 /HPF   Sq Epi: x / Non Sq Epi: 2 /HPF / Bacteria: Negative /HPF                RADIOLOGY, EKG & ADDITIONAL TESTS:

## 2024-03-01 NOTE — CONSULT NOTE ADULT - TIME BILLING
76 minutes spent for extensive review of the physical chart, electronic medical record, and documentation to obtain collateral information including but not limited to:  [x] Inpatient records (ED, H&P, primary team, and consultants if applicable, care coordination)  [x] Inpatient values/results (biomarkers, immunoassays, imaging, and microbiology results)  [x] Current or proposed treatment plans  [x] Pharmacotherapy review  [x] Discussion and coordinating care with primary team and interdisciplinary staff and floor staff  [x] Discussion including counseling/ education with family    In addition to above time, Spent 16 minutes separately discussing goals of care/ advanced care planning with family

## 2024-03-01 NOTE — GOALS OF CARE CONVERSATION - ADVANCED CARE PLANNING - TREATMENT GUIDELINE COMMENT
DNR/DNI. No feeding tube.   Continue medical management DNR/DNI. No feeding tube. Determine use/ limitation of antibiotics and IVF  Continue medical management DNR/DNI.  No non-invasive ventilation. No feeding tube. Determine use/ limitation of antibiotics and IVF. No hemodialysis.   Continue medical management

## 2024-03-01 NOTE — PATIENT PROFILE ADULT - FALL HARM RISK - HARM RISK INTERVENTIONS

## 2024-03-01 NOTE — GOALS OF CARE CONVERSATION - ADVANCED CARE PLANNING - CONVERSATION DETAILS
Patient lives at home with her daughter and granddaughter and their family. Family share patient has had a recent decline in functional status and requiring more assistance with ADLs and decline in PO intake recently.     Advanced care planning discussed. Family share wishes for patient to be DNR/DNI. No feeding tube. Determine use/ limitation of antibiotics and IVF.     Discussed option of hospice services on discharge. Educated family on the philosophy of hospice care. Discussed the services provided under hospice services emphasizing the goal of elevating patient's quality of life and optimizing symptom management and avoiding unnecessary future hospitalizations. In addition to symptom management expertise, hospice provides supportive counseling services, nutritional support and chaplaincy services.  Family to further consider if they are interested in these services. Patient lives at home with her daughter and granddaughter and their family. Family shares patient has had a recent decline in functional status and requiring more assistance with ADLs and decline in PO intake recently.     Advanced care planning discussed. Family share wishes for patient to be DNR/DNI. No feeding tube. Determine use/ limitation of antibiotics and IVF.     Discussed option of hospice services on discharge. Educated family on the philosophy of hospice care. Discussed the services provided under hospice services emphasizing the goal of elevating patient's quality of life and optimizing symptom management and avoiding unnecessary future hospitalizations. In addition to symptom management expertise, hospice provides supportive counseling services, nutritional support and chaplaincy services.  Family to further consider if they are interested in these services. Patient lives at home with her daughter and granddaughter and their family. Family shares patient has had a recent decline in functional status and requiring more assistance with ADLs and decline in PO intake recently.     Advanced care planning discussed. Family share wishes for patient to be DNR/DNI. No non-invasive ventilation. No feeding tube. Determine use/ limitation of antibiotics and IVF. No hemodialysis.   MOLST completed to reflect these wishes.     Discussed option of hospice services on discharge. Educated family on the philosophy of hospice care. Discussed the services provided under hospice services emphasizing the goal of elevating patient's quality of life and optimizing symptom management and avoiding unnecessary future hospitalizations. In addition to symptom management expertise, hospice provides supportive counseling services, nutritional support and chaplaincy services.  Family to further consider if they are interested in these services.

## 2024-03-01 NOTE — CONSULT NOTE ADULT - PROBLEM SELECTOR RECOMMENDATION 5
- in setting of RADHA + hypernatremia with underlying dementia   - please coordinate care with family  -Frequent reassurance and verbal orientation   -Family members or other familiar persons by his bedside.   -Move to a room with a window   -Update the calendar date in the room.    - Monitor for constipation, urinary retention, pain, hunger, thirst, etc.    - Promote sleep wake cycle and reorientation as indicated.  - Minimize use of benzodiazepines, opioids, anticholinergics, and other deliriogenic agents whenever possible.

## 2024-03-01 NOTE — ED ADULT NURSE NOTE - NSFALLHARMRISKINTERV_ED_ALL_ED
Assistance OOB with selected safe patient handling equipment if applicable/Assistance with ambulation/Communicate risk of Fall with Harm to all staff, patient, and family/Monitor gait and stability/Monitor for mental status changes and reorient to person, place, and time, as needed/Provide visual cue: red socks, yellow wristband, yellow gown, etc/Reinforce activity limits and safety measures with patient and family/Toileting schedule using arm’s reach rule for commode and bathroom/Use of alarms - bed, stretcher, chair and/or video monitoring/Bed in lowest position, wheels locked, appropriate side rails in place/Call bell, personal items and telephone in reach/Instruct patient to call for assistance before getting out of bed/chair/stretcher/Non-slip footwear applied when patient is off stretcher/[place] to call system/Physically safe environment - no spills, clutter or unnecessary equipment/Purposeful Proactive Rounding/Room/bathroom lighting operational, light cord in reach

## 2024-03-01 NOTE — H&P ADULT - HISTORY OF PRESENT ILLNESS
Patient is an 88 year old female with dementia, CHF(EF 25% 12/23), CAD w/ 1DES in 2006, CKD, DM, HTN/HLD who was admitted with hypernatremia after an OP lab result of 165.       Patient's grand daughter stated that the last two weeks she has required assistance to ambulate (she usually is independent). She also has been drinking and eating less. She was placed on fluid restriction of 800CC.     Patient was admitted to the ED afebrile and only concerning vital of a pressure 96/61. Her labs were noteworthy for , BUN/CR 2.92/84 (baseline creatnine : ), mild transaminitis (54/78), UA w/ trace LE, X-ray unremarkable.       Of note, patient was admitted in 12/12/23 due to chest pain. She was started on plavix. Her EF showed some improvement and she was discharged.  Patient is an 88 year old female with dementia, CHF(EF 25% 12/23), CAD w/ 1DES in 2006, CKD, DM, HTN/HLD who was admitted with hypernatremia after an OP lab result of 165.       Patient's grand daughter stated that the last two weeks she has required assistance to ambulate (she usually is independent). She also has been drinking and eating less. She was placed on fluid restriction of 800CC.     Patient was admitted to the ED afebrile and only concerning vital of a pressure 96/61. Her labs were noteworthy for , BUN/CR 2.92/84 (baseline creatinine : ), mild transaminitis (54/78), UA w/ trace LE, X-ray unremarkable.       Of note, patient was admitted in 12/12/23 due to chest pain. She was started on plavix. Her EF showed some improvement and she was discharged.  Patient is an 88 year old female with dementia, CHF(EF 25% 12/23), CAD w/ 1DES in 2006, CKD, DM, HTN/HLD who was admitted with hypernatremia after an OP lab result of 165.     Patient's grand daughter stated that the last two weeks she has required assistance to ambulate (she usually is independent). She also has been drinking and eating less. She was placed on fluid restriction of 800CC.     Patient was admitted to the ED afebrile and only concerning vital of a pressure 96/61. Her labs were noteworthy for , BUN/CR 2.92/84 (baseline creatinine : ), mild transaminitis (54/78), UA w/ trace LE, X-ray unremarkable.     Of note, patient was admitted in 12/12/23 due to chest pain. She was started on plavix. Her EF showed some improvement and she was discharged.  Patient is an 88 year old female with dementia, CHF(EF 25% 12/23), CAD w/ 1DES in 2006, CKD, DM, HTN/HLD who was admitted with hypernatremia after an OP lab result of 165.     Patient's grand daughter stated that the last two weeks she has required assistance to ambulate (she usually is independent). She also has been drinking and eating less. She was placed on fluid restriction of 800CC.     Patient was admitted to the ED afebrile and only concerning vital of a pressure 96/61. Her labs were noteworthy for , BUN/CR 2.92/84 (baseline creatinine : ), mild transaminitis (54/78), UA w/ trace LE, X-ray unremarkable.     Of note, patient was admitted in 12/12/23 due to chest pain. She was started on plavix. Her EF showed some improvement and she was discharged.     NOT COMPLETE/Working (pre staff)  Patient is an 88 year old female with dementia, CHF(EF 25% 12/23), CAD w/ 1DES in 2006, CKD, DM, HTN/HLD who was admitted with hypernatremia after an OP lab result of 165.     Patient's grand daughter stated that the last two weeks she has required assistance to ambulate (she usually is independent). She also has been drinking and eating less. She was placed on fluid restriction of 800CC.     Patient was admitted to the ED afebrile and only concerning vital of a pressure 96/61. Her labs were noteworthy for , BUN/CR 2.92/84 (baseline creatinine : ), mild transaminitis (54/78), UA w/ trace LE, X-ray unremarkable.     Of note, patient was admitted in 12/12/23 due to chest pain. She was started on plavix. Her EF showed some improvement and she was discharged.      Patient is an 88 year old female with dementia, CHF(EF 25% 12/23), CAD w/ 1DES in 2006, CKD, DM, HTN/HLD who was admitted with hypernatremia after an OP lab result of 165.     Patient's grand daughter stated that the last two weeks she has required assistance to ambulate (she usually is independent). She also has been drinking and eating less. She was placed on fluid restriction of 800CC.     Patient was admitted to the ED afebrile and only concerning vital of a pressure 96/61. Her labs were noteworthy for , BUN/CR 2.92/84 (baseline creatinine : ), mild transaminitis (54/78), UA w/ trace LE, X-ray unremarkable.     Of note, patient was admitted in 12/12/23 due to chest pain. She was started on Plavix Her EF showed some improvement and she was discharged.

## 2024-03-01 NOTE — CONSULT NOTE ADULT - PROBLEM SELECTOR RECOMMENDATION 2
- TTE 12/2023-  1. The left ventricular cavity is normal. Left ventricular wall thickness is normal. Left ventricular systolic function is severely decreased with a calculated ejection fraction of 25 % by the Powers's biplane method of disks. There is global left ventricular hypokinesis. There is mild (grade 1) left ventricular diastolic dysfunction. There is no evidence of a left ventricular thrombus. 2. Normal right ventricular cavity size and probably normal systolic function.   3. Structurally normal mitral valve with normal leaflet excursion. There is calcification of the mitral valve annulus. There is mild mitral regurgitation. 4. The aortic valve anatomy cannot be determined. There is calcification of the aortic valve leaflets. There is mild aortic regurgitation. 5. Compared to the transthoracic echocardiogram performed on 4/3/2023 left ventricular systolic function has improved but remains severely depressed.

## 2024-03-01 NOTE — ED ADULT TRIAGE NOTE - CHIEF COMPLAINT QUOTE
Pts granddaughter states pts primary sent pt to hospital for Na 165 and hypotension.  Pt had been having difficulty walking and has needed more assistance in ADLs lately.  Pt has had constipation for 4-5 days.  Hx:CHF, HTN, high Cholesterol, kidney failure, demintia

## 2024-03-01 NOTE — H&P ADULT - PROBLEM SELECTOR PLAN 6
Diet:  DVT prophylaxis:  Dispo:  PCP:  Code: DNR/DNI Recent history of JASMYNE in 2006. Home medications: Atorvastatin 20mg daily, Plavix 75mg daily.    Plan  - Continue home medications Patient with persistently elevated liver enzymes in a hepatic pattern. On admission, AST/ALT 54/78. Could be in the setting of statin use or another hepatic etiology.     Plan  - Hold statin until further notice  - FU labs for hepatic panel

## 2024-03-01 NOTE — H&P ADULT - PROBLEM SELECTOR PLAN 2
Admission creatinine at 2.92, patient's baseline is 1.5-1.6. ISO of hypernatremia most likely pre-renal due to reduced PO intake.     Plan  - Monitor improvement of creatinine with fluid resuscitation. Patient with sudden mental status changes occurring two weeks back. On exam was AOx0-1. Most likely due to patient's hypernatremia and worsening dementia.     Plan  - Monitor status with improvement of sodium levels.

## 2024-03-01 NOTE — CONSULT NOTE ADULT - PROBLEM SELECTOR RECOMMENDATION 7
- HCP paperwork found in Patient Window tab . HCP paperwork completed in 11/2022 appointing the following:  Primary HCP: Sarah Carrasco; Alternate HCP: Justa Hebert   - Advanced care planning regarding code status preferences discussed with family. Family shared wishes for DNR/DNI.  No non-invasive ventilation. No feeding tube. Determine use/ limitation of antibiotics and IVF. No hemodialysis. MOLST completed to reflect these wishes  Continue medical management  Also introduced hospice philosophy of care / services to family .  See GOC note  Spent 16 minutes for advanced care planning/goals of care

## 2024-03-01 NOTE — H&P ADULT - PROBLEM SELECTOR PLAN 5
Recent history of JASMYNE in 2006. Home medications: Atorvastatin 20mg daily, Plavix 75mg daily.    Plan  - Continue home medications History of HFref last EF 25% (12/23), slightly improved from prior. Noted fluid status on exam was .   Home medications : Lasix 40mg daily, Entresto 24/26mg BID, Farxiga 10mg daily, Toprol 25mg BID    Plan  - Hold lasix, entresto, and toprol iso of low blood pressures  - Continue Farxiga History of HFref last EF 25% (12/23), slightly improved from prior. Noted fluid status on exam was .   Home medications : Lasix 40mg daily, Entresto 24/26mg BID, Farxiga 10mg daily, Toprol 25mg BID    Plan  - Hold lasix, and Toprol iso of low blood pressures  - Continue Farxiga, Entresto with hold parameters

## 2024-03-01 NOTE — ED PROVIDER NOTE - ATTENDING CONTRIBUTION TO CARE
Pt was seen and evaluated by me. Pt is a 89 y/o female with PMHx of HTN, DM type 2, CHF, dementia, CKD, and HLD who presented to the ED for hypernatremia on outpatient labs with hypotension over the past few days. Granddaughter noted pt had outpt labs and was found to have a Na of 165 and advised to come to the ED. Granddaughter notes pt has required more assistance getting around over the past 2 wks. Pt also noted to not be eating as much. Pt denies any headache, fever, chills, nausea, vomiting, SOB, chest pain, or abd pain. Granddaughter also noticed BP has been 90s but patient's BP usually in 100s.  VITALS: Vitals have been reviewed.  GEN APPEARANCE: Awake and oriented to person and place  HEAD: Atraumatic, normocephalic.   EYES: PERRL.   EARS: Gross hearing intact.   NOSE: No nasal discharge.   THROAT: Mucus membranes dry  CV: RRR, S1S2, no c/r/m/g. No cyanosis or pallor.   LUNGS: CTAB. No wheezing. No rales. No rhonchi. No diminished breath sounds.   ABDOMEN: Soft, NTND. No guarding or rebound.   MSK/EXT: Spine appears normal, no spine point tenderness.   NEURO: Awake and oriented to person and place. No focal deficits.   SKIN: Normal color for race, warm, dry and intact. No evidence of rash.  89 y/o female with PMHx of HTN, DM type 2, CHF, dementia, CKD, and HLD who presented to the ED for hypernatremia on outpatient labs with hypotension over the past few days.  Concern for Hypernatremia, UTI, Renal Insufficiency, Dehydration  Labs, EKG, CXR, UA, IVF

## 2024-03-01 NOTE — CONSULT NOTE ADULT - PROBLEM SELECTOR RECOMMENDATION 9
- Per discussion with family, patient with recent decline in functional status and requiring more assistance with ADLs.   - supportive care

## 2024-03-01 NOTE — ED PROVIDER NOTE - OBJECTIVE STATEMENT
89 yo F PMHx of HTN, DM, CHF, dementia, CKD, HLD presents with hypernatremia on outpatient labs, hypotension. Patient's granddaughter at bedside states she had outpatient Na of 165. She has over the last 2 weeks required assistance getting around (usually gets around by herself). Grandaughter states she has been eating and drinking less. she is usually on a fluid restriction 800cc but as per family she may have been taking in less. Endorse foul smelling urine. No fevers, chills, nausea, vomiting. Endorses constipation but last BM was yesterday evening. They also noticed BP has been 90s but patient's BP usually in 100s.

## 2024-03-01 NOTE — GOALS OF CARE CONVERSATION - ADVANCED CARE PLANNING - LOCATION OF DISCUSSION
Continue with Eliquis and Plavix therapy  Patient with episode of GI bleeding earlier in the month  After review with Cardiology was felt that the patient should be taken off of triple therapy with aspirin Plavix and Eliquis and continued with Plavix and Eliquis therapy for 1 year and then transition to aspirin Plavix at that time  Face to face

## 2024-03-01 NOTE — H&P ADULT - NSHPREVIEWOFSYSTEMS_GEN_ALL_CORE
REVIEW OF SYSTEMS:  CONSTITUTIONAL:  No weakness, fevers or chills  EYES/ENT:  No visual changes;  No vertigo or throat pain   NECK:  No pain or stiffness  RESPIRATORY:  No cough, wheezing, hemoptysis; No shortness of breath  CARDIOVASCULAR:  No chest pain or palpitations  GASTROINTESTINAL:  No abdominal or epigastric pain. No nausea, vomiting, or hematemesis; No diarrhea or constipation. No melena or hematochezia.  GENITOURINARY:  No dysuria, frequency or hematuria  MUSCULOSKELETAL:  FROM all extremities, normal strength, No calf tenderness  NEUROLOGICAL:  No numbness or weakness  SKIN:  No itching, rashes REVIEW OF SYSTEMS:  CONSTITUTIONAL: +weakness +AMS  EYES/ENT:  No visual changes;  No vertigo  NECK:  No pain or stiffness  RESPIRATORY:  No cough, wheezing, hemoptysis; No shortness of breath  CARDIOVASCULAR:  No chest pain or palpitations  GASTROINTESTINAL:  No abdominal or epigastric pain. No nausea, vomiting, or hematemesis; No diarrhea +constipation . No melena or hematochezia.  GENITOURINARY:  No dysuria, frequency or hematuria  MUSCULOSKELETAL:  FROM all extremities, normal strength, No calf tenderness  SKIN:  No itching, rashes

## 2024-03-01 NOTE — H&P ADULT - PROBLEM SELECTOR PLAN 8
Diet: Soft and Bite sized   DVT prophylaxis: Heparin 5000U subq   Dispo: Pending resolution   Code: DNR/DNI Home medications: Lasix 40mg daily, Entresto 24/26mg BID, Toprol 25mg BID    Plan   - Continue Entresto with hold parameters

## 2024-03-01 NOTE — H&P ADULT - PROBLEM SELECTOR PLAN 7
Diet: Soft and Bite szed   DVT prophylaxis:  Dispo:  PCP:  Code: DNR/DNI Home medications: Lasix 40mg daily, Entresto 24/26mg BID, Toprol 25mg BID    Plan   - Hold ISO hypotension Recent history of JASMYNE in 2006. Home medications: Atorvastatin 20mg daily, Plavix 75mg daily.    Plan  - Continue home medications

## 2024-03-01 NOTE — ED ADULT NURSE NOTE - OBJECTIVE STATEMENT
Patient presented to ED with granddaughter with a chief c/o elevated sodium as per PMD. Granddaughter states that she was also hypotensive at home and has had a progressive functional decline at home. Granddaughter states that typically patient is independent at home however beginning Feb patient began having difficulty walking, new episodes of incontinence, and increased confusion. Patient is a poor historian and unable to provide hx however denies any pain, SOB, dizziness, weakness at this time.    Patient is A/O x 1, NAD, (+) keloid scars to abdomen unknown origin, PERRLA, speech clear, neurologically intact with no deficits, strength b/l upper and lower extremties 5/5, sensation intact b/l upper and lower extremities, sinus bradycardia on CM S1 and S2 heard with no murmurs radial and pedal pulses present, capillary refill <3 , lungs clear b/l lobes throughout with no adventitious sounds or accessory muscle use, even and unlabored, abdomen soft and non-tender, bowel sounds heard x 4 quadrant, no edema noted. Safety maintained, bed in lowest position, call bell within reach, plan of care reviewed with patient , addressed all questions and concern, will continue to monitor patient.

## 2024-03-01 NOTE — ED PROVIDER NOTE - GASTROINTESTINAL NEGATIVE STATEMENT, MLM
[facility]  Ophthalmology  [address], [address]  [place] [address]  Phone: [phone]  Fax:     
no abdominal pain, no bloating, no constipation, no diarrhea, no nausea and no vomiting.

## 2024-03-01 NOTE — CONSULT NOTE ADULT - PROBLEM SELECTOR RECOMMENDATION 8
Case reviewed with outpatient Geriatric NP Mary and primary team  Thank you for allowing us to participate in your patient's care. Please page [phone] for any questions/concerns.

## 2024-03-01 NOTE — H&P ADULT - PROBLEM SELECTOR PLAN 3
Patient seen by palliative team here, patient is presently DNR/DNI    Plan  - Continue discussion with family and palliative. Admission creatinine at 2.92, patient's baseline is 1.5-1.6. ISO of hypernatremia most likely pre-renal due to reduced PO intake.     Plan  - Monitor improvement of creatinine with fluid resuscitation. Admission creatinine at 2.92, patient's baseline is 1.5-1.6. ISO of hypernatremia most likely pre-renal due to reduced PO intake.     Plan  - Monitor improvement of creatinine with fluid resuscitation.  - PVR and bladder scan 3/2/24 am  - Avoid nephrotoxic medications

## 2024-03-01 NOTE — H&P ADULT - NSHPPHYSICALEXAM_GEN_ALL_CORE
GENERAL: NAD, lying in bed comfortably  HEAD:  Atraumatic, normocephalic  EYES: EOMI, PERRLA, conjunctiva and sclera clear  NECK: Supple, trachea midline, no JVD  HEART: Regular rate and rhythm, no murmurs, rubs, or gallops  LUNGS: Unlabored respirations.  Clear to auscultation bilaterally, no crackles, wheezing, or rhonchi  ABDOMEN: Soft, nontender, nondistended, +BS  EXTREMITIES: 2+ peripheral pulses bilaterally. No clubbing, cyanosis, or edema  NERVOUS SYSTEM:  A&Ox3, moving all extremities, no focal deficits   SKIN: No rashes or lesions GENERAL: NAD, lying in bed comfortably, overall dry on exam   HEAD:  Atraumatic, normocephalic  EYES: EOMI, PERRLA, conjunctiva and sclera clear  NECK: Supple, trachea midline, no JVD  HEART: Regular rate and rhythm, no murmurs, rubs, or gallops  LUNGS: Unlabored respirations.  Clear to auscultation bilaterally, no crackles, wheezing, or rhonchi  ABDOMEN: Soft, nontender, nondistended, +BS  EXTREMITIES: 2+ peripheral pulses bilaterally. No clubbing, cyanosis, or edema  NERVOUS SYSTEM:  A&Ox0 but responsive and conversational   SKIN: No rashes or lesions

## 2024-03-01 NOTE — ED PROVIDER NOTE - CARE PLAN
Principal Discharge DX:	Hypernatremia  Secondary Diagnosis:	Acute kidney injury superimposed on CKD   1

## 2024-03-01 NOTE — ED PROVIDER NOTE - CLINICAL SUMMARY MEDICAL DECISION MAKING FREE TEXT BOX
89 yo F HTN, DM, CHF, dementia, CKD, HLD presents with hypernatremia on outpatient labs - VSS, pt is not hypotensive here, nonfocal exam - will obtain labs to assess electrolytes, ro acute infection with UA, CXR. Likely admit for hypernatremia and failure to thrive.

## 2024-03-01 NOTE — H&P ADULT - ASSESSMENT
Patient is an 88 year old female with dementia, CHF(EF 25% 12/23), CAD w/ 1DES in 2006, CKD, DM, HTN/HLD who was admitted with hypernatremia after an OP lab result of 165.

## 2024-03-01 NOTE — H&P ADULT - ATTENDING COMMENTS
88 year old female with dementia, CHF(EF 25% 12/23), CAD w/ 1DES in 2006, CKD, DM, HTN/HLD sent into the E.D for hypernatremia     PHYSICAL  GEN: NAD  CHEST: CTAB, normal respiratory effort  HEART: S1S2 normal   PSYCH AAO x 1  NEURO: SROM  EXT: No edema    #Hypernatremia  --Per granddaughter pt w/ lethargy/ generalized weakness, difficulty ambulating/ worsening confusion x 3-4 weeks. Saw outpatient provider 02/29 who obtained BMP which showed hypernatremia  --Suspect multifactorial i/s/o lasix, fluid restriction and decreased PO intake   --Free water deficit apx 2.7 L  --s/p 1L IVF Bolus in the E.D  --Resume LR 65cc/hr x 24 hours  --Juidicious use of IVF given gross LV dysfunction  --Hold lasix  --Encourage PO intake   --Monitor BMPs Q12H. Avoid overcorrection 8-10meq x 24hrs    #Constipation  --Granddaughter reports constipation x 2 weeks. Last BM yesterday but hard  --Resume senna/ miralax  --Monitor bowel movements    #RADHA on CKD  --w/ RADHA on CKD  --Suspect pre-renal i/s/o dehydration. Reasonable to r/o obstruction  --c/w gentle hydration as above  --PVR /Bladder scan x 1  --Monitor sCR  --Avoid nephrotoxins  --Consider Renal US if uptrending    #Transaminitis  --Noted.  --Unclear etiology. Possible i/s/o statin use  --Hold statin  --Obtain acute hep panel  --Trend LFTs  --Consider RUQ US if uptrending    #Bradycardia  --Noted. EKG w/ NSR/ LBBB  --Hold BB for now   --Monitor HR      PPx  DVT: HSQ  DIET: Soft diet. CC/DASH/TLC  DISPO: PT Kay

## 2024-03-01 NOTE — H&P ADULT - PROBLEM SELECTOR PLAN 4
History of HFref last EF 25% (12/23), slightly improved from prior. Noted fluid status on exam was .   Home medications : Lasix 40mg daily, Entresto 24/26mg BID, Farxiga 10mg daily, Toprol 25mg BID    Plan  - Hold lasix, entresto, and toprol iso of low blood pressures  - Continue Farxiga Patient seen by palliative team here, patient is presently DNR/DNI    Plan  - Continue discussion with family and palliative. Patient seen by palliative team here, patient is presently DNR/DNI    Plan  - Continue discussion with family and palliative.  - Bowel regiment: Senna + Miralax

## 2024-03-01 NOTE — H&P ADULT - PROBLEM SELECTOR PLAN 1
Patient had 165 sodium on outpatient Patient had 165 sodium on outpatient with change in mental status requiring assistence in the last two weeks. Admission sodium at 156. Patient dry on exam, with her pre-renal shaun most likely hypovolemic hypernatremic iso failure to thrive/reduced intake.     Plan  - Free water balance: 2.7L, Replete over 24 hour period. Patient had 165 sodium on outpatient with change in mental status requiring assistance in the last two weeks. Admission sodium at 156. Patient dry on exam, with her pre-renal shaun most likely hypovolemic hypernatremic iso failure to thrive/reduced intake. However, patient has history of HF need to consider fluid resuscitation     Plan  - Free water balance: 2.7L, will give 1L 50cc per hour for now   - Q12 BMP Patient had 165 sodium on outpatient with change in mental status requiring assistance in the last two weeks. Admission sodium at 156. Patient dry on exam, with her pre-renal shaun most likely hypovolemic hypernatremic iso failure to thrive/reduced intake. However, patient has history of HF need to consider fluid resuscitation: Free water balance: 2.7L,    Plan  - fluid : LR 1L 50cc/hr for 24 hours  - Q12 BMP

## 2024-03-02 LAB
ALBUMIN SERPL ELPH-MCNC: 3.3 G/DL — SIGNIFICANT CHANGE UP (ref 3.3–5)
ALBUMIN SERPL ELPH-MCNC: 3.5 G/DL — SIGNIFICANT CHANGE UP (ref 3.3–5)
ALP SERPL-CCNC: 55 U/L — SIGNIFICANT CHANGE UP (ref 40–120)
ALP SERPL-CCNC: 58 U/L — SIGNIFICANT CHANGE UP (ref 40–120)
ALT FLD-CCNC: 57 U/L — HIGH (ref 4–33)
ALT FLD-CCNC: 62 U/L — HIGH (ref 4–33)
ANION GAP SERPL CALC-SCNC: 10 MMOL/L — SIGNIFICANT CHANGE UP (ref 7–14)
ANION GAP SERPL CALC-SCNC: 11 MMOL/L — SIGNIFICANT CHANGE UP (ref 7–14)
ANION GAP SERPL CALC-SCNC: 14 MMOL/L — SIGNIFICANT CHANGE UP (ref 7–14)
AST SERPL-CCNC: 42 U/L — HIGH (ref 4–32)
AST SERPL-CCNC: 42 U/L — HIGH (ref 4–32)
BILIRUB SERPL-MCNC: 0.2 MG/DL — SIGNIFICANT CHANGE UP (ref 0.2–1.2)
BILIRUB SERPL-MCNC: 0.3 MG/DL — SIGNIFICANT CHANGE UP (ref 0.2–1.2)
BUN SERPL-MCNC: 49 MG/DL — HIGH (ref 7–23)
BUN SERPL-MCNC: 58 MG/DL — HIGH (ref 7–23)
BUN SERPL-MCNC: 68 MG/DL — HIGH (ref 7–23)
CALCIUM SERPL-MCNC: 7.5 MG/DL — LOW (ref 8.4–10.5)
CALCIUM SERPL-MCNC: 8.6 MG/DL — SIGNIFICANT CHANGE UP (ref 8.4–10.5)
CALCIUM SERPL-MCNC: 8.7 MG/DL — SIGNIFICANT CHANGE UP (ref 8.4–10.5)
CHLORIDE SERPL-SCNC: 118 MMOL/L — HIGH (ref 98–107)
CHLORIDE SERPL-SCNC: 119 MMOL/L — HIGH (ref 98–107)
CHLORIDE SERPL-SCNC: 97 MMOL/L — LOW (ref 98–107)
CO2 SERPL-SCNC: 20 MMOL/L — LOW (ref 22–31)
CO2 SERPL-SCNC: 22 MMOL/L — SIGNIFICANT CHANGE UP (ref 22–31)
CO2 SERPL-SCNC: 25 MMOL/L — SIGNIFICANT CHANGE UP (ref 22–31)
CREAT SERPL-MCNC: 1.59 MG/DL — HIGH (ref 0.5–1.3)
CREAT SERPL-MCNC: 1.97 MG/DL — HIGH (ref 0.5–1.3)
CREAT SERPL-MCNC: 2.2 MG/DL — HIGH (ref 0.5–1.3)
CULTURE RESULTS: NO GROWTH — SIGNIFICANT CHANGE UP
EGFR: 21 ML/MIN/1.73M2 — LOW
EGFR: 24 ML/MIN/1.73M2 — LOW
EGFR: 31 ML/MIN/1.73M2 — LOW
GLUCOSE SERPL-MCNC: 110 MG/DL — HIGH (ref 70–99)
GLUCOSE SERPL-MCNC: 767 MG/DL — CRITICAL HIGH (ref 70–99)
GLUCOSE SERPL-MCNC: 86 MG/DL — SIGNIFICANT CHANGE UP (ref 70–99)
HAV IGM SER-ACNC: SIGNIFICANT CHANGE UP
HBV CORE IGM SER-ACNC: SIGNIFICANT CHANGE UP
HBV SURFACE AG SER-ACNC: SIGNIFICANT CHANGE UP
HCT VFR BLD CALC: 33.4 % — LOW (ref 34.5–45)
HCV AB S/CO SERPL IA: 0.1 S/CO — SIGNIFICANT CHANGE UP (ref 0–0.99)
HCV AB SERPL-IMP: SIGNIFICANT CHANGE UP
HGB BLD-MCNC: 10.2 G/DL — LOW (ref 11.5–15.5)
MAGNESIUM SERPL-MCNC: 2.4 MG/DL — SIGNIFICANT CHANGE UP (ref 1.6–2.6)
MCHC RBC-ENTMCNC: 26.6 PG — LOW (ref 27–34)
MCHC RBC-ENTMCNC: 30.5 GM/DL — LOW (ref 32–36)
MCV RBC AUTO: 87 FL — SIGNIFICANT CHANGE UP (ref 80–100)
MRSA PCR RESULT.: SIGNIFICANT CHANGE UP
NRBC # BLD: 0 /100 WBCS — SIGNIFICANT CHANGE UP (ref 0–0)
NRBC # FLD: 0 K/UL — SIGNIFICANT CHANGE UP (ref 0–0)
PHOSPHATE SERPL-MCNC: 3.2 MG/DL — SIGNIFICANT CHANGE UP (ref 2.5–4.5)
PLATELET # BLD AUTO: 116 K/UL — LOW (ref 150–400)
POTASSIUM SERPL-MCNC: 3.4 MMOL/L — LOW (ref 3.5–5.3)
POTASSIUM SERPL-MCNC: 3.5 MMOL/L — SIGNIFICANT CHANGE UP (ref 3.5–5.3)
POTASSIUM SERPL-MCNC: 4 MMOL/L — SIGNIFICANT CHANGE UP (ref 3.5–5.3)
POTASSIUM SERPL-SCNC: 3.4 MMOL/L — LOW (ref 3.5–5.3)
POTASSIUM SERPL-SCNC: 3.5 MMOL/L — SIGNIFICANT CHANGE UP (ref 3.5–5.3)
POTASSIUM SERPL-SCNC: 4 MMOL/L — SIGNIFICANT CHANGE UP (ref 3.5–5.3)
PROT SERPL-MCNC: 6 G/DL — SIGNIFICANT CHANGE UP (ref 6–8.3)
PROT SERPL-MCNC: 6.5 G/DL — SIGNIFICANT CHANGE UP (ref 6–8.3)
RBC # BLD: 3.84 M/UL — SIGNIFICANT CHANGE UP (ref 3.8–5.2)
RBC # FLD: 14.6 % — HIGH (ref 10.3–14.5)
S AUREUS DNA NOSE QL NAA+PROBE: SIGNIFICANT CHANGE UP
SODIUM SERPL-SCNC: 128 MMOL/L — LOW (ref 135–145)
SODIUM SERPL-SCNC: 153 MMOL/L — HIGH (ref 135–145)
SODIUM SERPL-SCNC: 155 MMOL/L — HIGH (ref 135–145)
SPECIMEN SOURCE: SIGNIFICANT CHANGE UP
WBC # BLD: 3.87 K/UL — SIGNIFICANT CHANGE UP (ref 3.8–10.5)
WBC # FLD AUTO: 3.87 K/UL — SIGNIFICANT CHANGE UP (ref 3.8–10.5)

## 2024-03-02 PROCEDURE — 99232 SBSQ HOSP IP/OBS MODERATE 35: CPT | Mod: GC

## 2024-03-02 RX ORDER — SODIUM CHLORIDE 9 MG/ML
1000 INJECTION, SOLUTION INTRAVENOUS
Refills: 0 | Status: DISCONTINUED | OUTPATIENT
Start: 2024-03-02 | End: 2024-03-02

## 2024-03-02 RX ORDER — HEPARIN SODIUM 5000 [USP'U]/ML
5000 INJECTION INTRAVENOUS; SUBCUTANEOUS EVERY 8 HOURS
Refills: 0 | Status: DISCONTINUED | OUTPATIENT
Start: 2024-03-02 | End: 2024-03-05

## 2024-03-02 RX ORDER — SODIUM CHLORIDE 9 MG/ML
1000 INJECTION, SOLUTION INTRAVENOUS
Refills: 0 | Status: COMPLETED | OUTPATIENT
Start: 2024-03-02 | End: 2024-03-03

## 2024-03-02 RX ADMIN — CHLORHEXIDINE GLUCONATE 1 APPLICATION(S): 213 SOLUTION TOPICAL at 11:29

## 2024-03-02 RX ADMIN — HEPARIN SODIUM 5000 UNIT(S): 5000 INJECTION INTRAVENOUS; SUBCUTANEOUS at 22:26

## 2024-03-02 RX ADMIN — MIRTAZAPINE 7.5 MILLIGRAM(S): 45 TABLET, ORALLY DISINTEGRATING ORAL at 11:29

## 2024-03-02 RX ADMIN — SERTRALINE 25 MILLIGRAM(S): 25 TABLET, FILM COATED ORAL at 11:30

## 2024-03-02 RX ADMIN — SODIUM CHLORIDE 50 MILLILITER(S): 9 INJECTION, SOLUTION INTRAVENOUS at 13:39

## 2024-03-02 RX ADMIN — SENNA PLUS 2 TABLET(S): 8.6 TABLET ORAL at 22:26

## 2024-03-02 RX ADMIN — HEPARIN SODIUM 5000 UNIT(S): 5000 INJECTION INTRAVENOUS; SUBCUTANEOUS at 13:30

## 2024-03-02 RX ADMIN — PANTOPRAZOLE SODIUM 40 MILLIGRAM(S): 20 TABLET, DELAYED RELEASE ORAL at 06:12

## 2024-03-02 RX ADMIN — Medication 81 MILLIGRAM(S): at 11:29

## 2024-03-02 RX ADMIN — DAPAGLIFLOZIN 10 MILLIGRAM(S): 10 TABLET, FILM COATED ORAL at 11:30

## 2024-03-02 RX ADMIN — CLOPIDOGREL BISULFATE 75 MILLIGRAM(S): 75 TABLET, FILM COATED ORAL at 11:30

## 2024-03-02 NOTE — DIETITIAN INITIAL EVALUATION ADULT - SIGNS/SYMPTOMS
hypernatremia >7.5% weight loss x3 months, PO intake </=75% of estimated needs for >/= 7 days >7.5% weight loss x3 months, PO intake </=50% of estimated needs for >/= 5 days

## 2024-03-02 NOTE — DIETITIAN INITIAL EVALUATION ADULT - OTHER CALCULATIONS
Apparent clinically significant ~20 lb (16%) weight loss x<3 months, per history obtained via chart: (3/1 dosing) 104.2 lbs / 47.3 kg, (12/11/23) 124 lbs, (7/21/23) 123 lbs, (3/29/23) 138 lbs.  Ideal Body Weight: 120 lbs / 54.5 kg +/-10%

## 2024-03-02 NOTE — PROVIDER CONTACT NOTE (CRITICAL VALUE NOTIFICATION) - SITUATION
patient blood glucose on . Blood was drawn in the same arm above where the IV dextrose fluids running.

## 2024-03-02 NOTE — DIETITIAN INITIAL EVALUATION ADULT - OTHER INFO
Per chart, pt is 88 year old female PMH dementia, CHF (EF 25% 12/23), CAD s/p 1 JASMYNE (2006), CKD, type 2 DM, HTN, HLD admitted with hypernatremia. Palliative was consulted, DNR/DNI/no tube feeding per GO.    Pt's daughter present at bedside throughout interaction, whom pt lives at home with. Confirms NKFA. History of type 2 DM, recent HbA1c (12/2023) 5.3%. Notable medications PTA per H&P inclusive of Lasix, Farxiga 10 mg qD and mirtazapine. Reports decline in functional status x2 weeks PTA, requiring more assistance with ADLs. More recently with decreased PO intake, requiring pureed foods.     Pt continues on texture modified diet per MD. Of note, a soft and bite sized diet is not available in house therefore pt is being provided with a minced and moist diet. Daughter reports ongoing poor PO intake, requires assistance and encouragement at meal times. Requesting puree diet with Ensure supplementation, pudding and ice cream. Reports pt with new complaint of dental pain. No noted GI distress, unknown last BM; ordered for senna 2 tablets qHS. Labs notable for hypernatremia.

## 2024-03-02 NOTE — PROGRESS NOTE ADULT - PROBLEM SELECTOR PLAN 6
Patient with persistently elevated liver enzymes in a hepatic pattern. On admission, AST/ALT 54/78. Could be in the setting of statin use or another hepatic etiology.     Plan  - Hold statin until further notice  - FU labs for hepatic panel

## 2024-03-02 NOTE — PROGRESS NOTE ADULT - PROBLEM SELECTOR PLAN 1
Patient had 165 sodium on outpatient with change in mental status requiring assistance in the last two weeks. Admission sodium at 156. Patient dry on exam, with her pre-renal shaun most likely hypovolemic hypernatremic iso failure to thrive/reduced intake. However, patient has history of HF need to consider fluid resuscitation: Free water balance: 2.7L,    Plan  - fluid : LR 1L 50cc/hr for 24 hours  - Q12 BMP

## 2024-03-02 NOTE — DIETITIAN INITIAL EVALUATION ADULT - PERTINENT MEDS FT
December 28, 2017    Patient: Robert Ray   Date of Visit: 12/28/2017       To Whom It May Concern:    Bladimir Velasquez was seen and treated in our emergency department on 12/28/2017. He should not return to work until Vomiting resolves.     If you h dapagliflozin  dextrose 5% IV Continuous   mirtazapine   pantoprazole Tablet  senna

## 2024-03-02 NOTE — DIETITIAN INITIAL EVALUATION ADULT - ADD RECOMMEND
1) Recommend low sodium puree diet + Ensure Plus High Protein 1 PO 3x daily (350 kcal, 20 gm protein per 8 oz serving).  2) Provide assistance and encouragement at meal times.  3) Obtained food preferences, will honor as able.

## 2024-03-02 NOTE — PROGRESS NOTE ADULT - PROBLEM SELECTOR PLAN 2
Patient with sudden mental status changes occurring two weeks back. On exam was AOx0-1. Most likely due to patient's hypernatremia and worsening dementia.     Plan  - Monitor status with improvement of sodium levels.  - Debility recs per palliative: Reassurance+ verbal orientation, familiar person at bedside, update calendar

## 2024-03-02 NOTE — PROVIDER CONTACT NOTE (CRITICAL VALUE NOTIFICATION) - ACTION/TREATMENT ORDERED:
MD notified and aware. Stat CMP reordered and blood was drawn and sent to lab. Awaiting new results. MD notified and aware. Stat CMP ordered. Blood drawn on opposite arm of the D5 and sent to lab. Awaiting new results.

## 2024-03-02 NOTE — PROGRESS NOTE ADULT - PROBLEM SELECTOR PLAN 5
History of HFref last EF 25% (12/23), slightly improved from prior. Noted fluid status on exam was .   Home medications : Lasix 40mg daily, Entresto 24/26mg BID, Farxiga 10mg daily, Toprol 25mg BID    Plan  - Hold lasix, and Toprol iso of low blood pressures  - Continue Farxiga, Entresto with hold parameters

## 2024-03-02 NOTE — DIETITIAN INITIAL EVALUATION ADULT - NS FNS DIET ORDER
Soft and Bite Sized  Consistent Carbohydrate {No Snacks}  DASH/TLC {Sodium & Cholesterol Restricted}

## 2024-03-02 NOTE — DIETITIAN NUTRITION RISK NOTIFICATION - ADDITIONAL COMMENTS/DIETITIAN RECOMMENDATIONS
Please see Dietitian Initial Assessment for complete recommendations.  Penny Moscoso, CASE, CDN #[id number]  Also available on [employer] Teams

## 2024-03-02 NOTE — PROGRESS NOTE ADULT - SUBJECTIVE AND OBJECTIVE BOX
****Anjel Sinclair Internal Medicine PGY-1*****    CHIEF COMPLAINT:    Overnight Events:  Interval Events:    REVIEW OF SYSTEMS:  CONSTITUTIONAL: No weakness, fevers or chills  EYES/ENT: No visual changes;  No vertigo or throat pain   NECK: No pain or stiffness  RESPIRATORY: No cough, wheezing, hemoptysis; No shortness of breath  CARDIOVASCULAR: No chest pain or palpitations  GASTROINTESTINAL: No abdominal or epigastric pain. No nausea, vomiting, or hematemesis; No diarrhea or constipation. No melena or hematochezia.  GENITOURINARY: No dysuria, frequency or hematuria  NEUROLOGICAL: No numbness or weakness  SKIN: No itching, burning, rashes, or lesions   All other review of systems is negative unless indicated above.    OBJECTIVE:  ICU Vital Signs Last 24 Hrs  T(C): 36.5 (02 Mar 2024 06:00), Max: 36.7 (01 Mar 2024 22:35)  T(F): 97.7 (02 Mar 2024 06:00), Max: 98.1 (01 Mar 2024 22:35)  HR: 54 (02 Mar 2024 06:00) (49 - 77)  BP: 100/52 (02 Mar 2024 06:00) (96/61 - 110/47)  BP(mean): --  ABP: --  ABP(mean): --  RR: 17 (02 Mar 2024 06:00) (16 - 19)  SpO2: 100% (02 Mar 2024 06:00) (98% - 100%)    O2 Parameters below as of 02 Mar 2024 06:00  Patient On (Oxygen Delivery Method): room air              CAPILLARY BLOOD GLUCOSE          PHYSICAL EXAM:  Physical Exam: GENERAL: NAD, lying in bed comfortably, overall dry on exam   HEAD:  Atraumatic, normocephalic  EYES: EOMI, PERRLA, conjunctiva and sclera clear  NECK: Supple, trachea midline, no JVD  HEART: Regular rate and rhythm, no murmurs, rubs, or gallops  LUNGS: Unlabored respirations.  Clear to auscultation bilaterally, no crackles, wheezing, or rhonchi  ABDOMEN: Soft, nontender, nondistended, +BS  EXTREMITIES: 2+ peripheral pulses bilaterally. No clubbing, cyanosis, or edema  NERVOUS SYSTEM:  A&Ox0 but responsive and conversational   SKIN: No rashes or lesions    HOSPITAL MEDICATIONS:  MEDICATIONS  (STANDING):  aspirin enteric coated 81 milliGRAM(s) Oral daily  chlorhexidine 2% Cloths 1 Application(s) Topical daily  clopidogrel Tablet 75 milliGRAM(s) Oral daily  dapagliflozin 10 milliGRAM(s) Oral daily  lactated ringers. 1000 milliLiter(s) (50 mL/Hr) IV Continuous <Continuous>  mirtazapine 7.5 milliGRAM(s) Oral daily  pantoprazole    Tablet 40 milliGRAM(s) Oral before breakfast  sacubitril 49 mG/valsartan 51 mG 1 Tablet(s) Oral two times a day  senna 2 Tablet(s) Oral at bedtime  sertraline 25 milliGRAM(s) Oral daily    MEDICATIONS  (PRN):      LABS:                        12.0   5.59  )-----------( 145      ( 01 Mar 2024 10:40 )             39.7     Hgb Trend: 12.0<--  03-01    154<H>  |  118<H>  |  77<H>  ----------------------------<  100<H>  3.8   |  22  |  2.48<H>    Ca    8.9      01 Mar 2024 21:45  Phos  3.3     03-01  Mg     2.50     03-01    TPro  7.4  /  Alb  3.8  /  TBili  0.3  /  DBili  x   /  AST  54<H>  /  ALT  78<H>  /  AlkPhos  69  03-01    Creatinine Trend: 2.48<--, 2.92<--    Urinalysis Basic - ( 01 Mar 2024 21:45 )    Color: x / Appearance: x / SG: x / pH: x  Gluc: 100 mg/dL / Ketone: x  / Bili: x / Urobili: x   Blood: x / Protein: x / Nitrite: x   Leuk Esterase: x / RBC: x / WBC x   Sq Epi: x / Non Sq Epi: x / Bacteria: x        Venous Blood Gas:  03-01 @ 10:40  7.32/54/44/28/69.8  VBG Lactate: 1.8      MICROBIOLOGY:       RADIOLOGY:  [ ] Reviewed by me   ****Anjel Sinclair Internal Medicine PGY-1*****    CHIEF COMPLAINT: Hypernatremia iso failure to thrive     Overnight Events: NA  Interval Events: Patient reported feeling okay. Her Na was 155 overnight.     REVIEW OF SYSTEMS:  CONSTITUTIONAL: No weakness, fevers or chills  EYES/ENT: No visual changes;  No vertigo or throat pain   NECK: No pain or stiffness  RESPIRATORY: No cough, wheezing, hemoptysis; No shortness of breath  CARDIOVASCULAR: No chest pain or palpitations  GASTROINTESTINAL: No abdominal or epigastric pain. No nausea, vomiting, or hematemesis; No diarrhea or constipation. No melena or hematochezia.  GENITOURINARY: No dysuria, frequency or hematuria  NEUROLOGICAL: No numbness or weakness  SKIN: No itching, burning, rashes, or lesions   All other review of systems is negative unless indicated above.    OBJECTIVE:  ICU Vital Signs Last 24 Hrs  T(C): 36.5 (02 Mar 2024 06:00), Max: 36.7 (01 Mar 2024 22:35)  T(F): 97.7 (02 Mar 2024 06:00), Max: 98.1 (01 Mar 2024 22:35)  HR: 54 (02 Mar 2024 06:00) (49 - 77)  BP: 100/52 (02 Mar 2024 06:00) (96/61 - 110/47)  BP(mean): --  ABP: --  ABP(mean): --  RR: 17 (02 Mar 2024 06:00) (16 - 19)  SpO2: 100% (02 Mar 2024 06:00) (98% - 100%)    O2 Parameters below as of 02 Mar 2024 06:00  Patient On (Oxygen Delivery Method): room air              CAPILLARY BLOOD GLUCOSE          PHYSICAL EXAM:  Physical Exam: GENERAL: NAD, lying in bed comfortably, overall dry on exam   HEAD:  Atraumatic, normocephalic  EYES: EOMI, PERRLA, conjunctiva and sclera clear  NECK: Supple, trachea midline, no JVD  HEART: Regular rate and rhythm, no murmurs, rubs, or gallops  LUNGS: Unlabored respirations.  Clear to auscultation bilaterally, no crackles, wheezing, or rhonchi  ABDOMEN: Soft, nontender, nondistended, +BS  EXTREMITIES: 2+ peripheral pulses bilaterally. No clubbing, cyanosis, or edema  NERVOUS SYSTEM:  A&Ox0 but responsive and conversational   SKIN: No rashes or lesions    HOSPITAL MEDICATIONS:  MEDICATIONS  (STANDING):  aspirin enteric coated 81 milliGRAM(s) Oral daily  chlorhexidine 2% Cloths 1 Application(s) Topical daily  clopidogrel Tablet 75 milliGRAM(s) Oral daily  dapagliflozin 10 milliGRAM(s) Oral daily  lactated ringers. 1000 milliLiter(s) (50 mL/Hr) IV Continuous <Continuous>  mirtazapine 7.5 milliGRAM(s) Oral daily  pantoprazole    Tablet 40 milliGRAM(s) Oral before breakfast  sacubitril 49 mG/valsartan 51 mG 1 Tablet(s) Oral two times a day  senna 2 Tablet(s) Oral at bedtime  sertraline 25 milliGRAM(s) Oral daily    MEDICATIONS  (PRN):      LABS:                        12.0   5.59  )-----------( 145      ( 01 Mar 2024 10:40 )             39.7     Hgb Trend: 12.0<--  03-01    154<H>  |  118<H>  |  77<H>  ----------------------------<  100<H>  3.8   |  22  |  2.48<H>    Ca    8.9      01 Mar 2024 21:45  Phos  3.3     03-01  Mg     2.50     03-01    TPro  7.4  /  Alb  3.8  /  TBili  0.3  /  DBili  x   /  AST  54<H>  /  ALT  78<H>  /  AlkPhos  69  03-01    Creatinine Trend: 2.48<--, 2.92<--    Urinalysis Basic - ( 01 Mar 2024 21:45 )    Color: x / Appearance: x / SG: x / pH: x  Gluc: 100 mg/dL / Ketone: x  / Bili: x / Urobili: x   Blood: x / Protein: x / Nitrite: x   Leuk Esterase: x / RBC: x / WBC x   Sq Epi: x / Non Sq Epi: x / Bacteria: x        Venous Blood Gas:  03-01 @ 10:40  7.32/54/44/28/69.8  VBG Lactate: 1.8      MICROBIOLOGY:       RADIOLOGY:  [ ] Reviewed by me

## 2024-03-02 NOTE — PROGRESS NOTE ADULT - PROBLEM SELECTOR PLAN 8
Home medications: Lasix 40mg daily, Entresto 24/26mg BID, Toprol 25mg BID    Plan   - Continue Entresto with hold parameters

## 2024-03-02 NOTE — PROGRESS NOTE ADULT - PROBLEM SELECTOR PLAN 3
Admission creatinine at 2.92, patient's baseline is 1.5-1.6. ISO of hypernatremia most likely pre-renal due to reduced PO intake.     Plan  - Monitor improvement of creatinine with fluid resuscitation.  - PVR and bladder scan 3/2/24 am  - Avoid nephrotoxic medications

## 2024-03-02 NOTE — DIETITIAN INITIAL EVALUATION ADULT - PERTINENT LABORATORY DATA
(3/2) Na 155<H>, BUN 68<H>, Cr 2.20<H>, BG 86, K+ 3.5, Phos 3.2, Mg 2.40, Alk Phos 55, ALT/SGPT 62<H>, AST/SGOT 42<H>

## 2024-03-02 NOTE — PROVIDER CONTACT NOTE (OTHER) - ACTION/TREATMENT ORDERED:
MD notified and aware. As per MD, the patient's entresto was discontinued, monitoring blood pressure to see if will go up higher. No further orders at this time.

## 2024-03-03 LAB
ALBUMIN SERPL ELPH-MCNC: 3.2 G/DL — LOW (ref 3.3–5)
ALP SERPL-CCNC: 52 U/L — SIGNIFICANT CHANGE UP (ref 40–120)
ALT FLD-CCNC: 47 U/L — HIGH (ref 4–33)
ANION GAP SERPL CALC-SCNC: 10 MMOL/L — SIGNIFICANT CHANGE UP (ref 7–14)
ANION GAP SERPL CALC-SCNC: 10 MMOL/L — SIGNIFICANT CHANGE UP (ref 7–14)
AST SERPL-CCNC: 35 U/L — HIGH (ref 4–32)
BILIRUB SERPL-MCNC: 0.2 MG/DL — SIGNIFICANT CHANGE UP (ref 0.2–1.2)
BUN SERPL-MCNC: 49 MG/DL — HIGH (ref 7–23)
BUN SERPL-MCNC: 49 MG/DL — HIGH (ref 7–23)
CALCIUM SERPL-MCNC: 8.5 MG/DL — SIGNIFICANT CHANGE UP (ref 8.4–10.5)
CALCIUM SERPL-MCNC: 8.6 MG/DL — SIGNIFICANT CHANGE UP (ref 8.4–10.5)
CHLORIDE SERPL-SCNC: 111 MMOL/L — HIGH (ref 98–107)
CHLORIDE SERPL-SCNC: 115 MMOL/L — HIGH (ref 98–107)
CO2 SERPL-SCNC: 24 MMOL/L — SIGNIFICANT CHANGE UP (ref 22–31)
CO2 SERPL-SCNC: 25 MMOL/L — SIGNIFICANT CHANGE UP (ref 22–31)
CREAT SERPL-MCNC: 1.78 MG/DL — HIGH (ref 0.5–1.3)
CREAT SERPL-MCNC: 1.83 MG/DL — HIGH (ref 0.5–1.3)
EGFR: 26 ML/MIN/1.73M2 — LOW
EGFR: 27 ML/MIN/1.73M2 — LOW
GLUCOSE SERPL-MCNC: 91 MG/DL — SIGNIFICANT CHANGE UP (ref 70–99)
GLUCOSE SERPL-MCNC: 97 MG/DL — SIGNIFICANT CHANGE UP (ref 70–99)
HCT VFR BLD CALC: 32.9 % — LOW (ref 34.5–45)
HGB BLD-MCNC: 10.3 G/DL — LOW (ref 11.5–15.5)
MAGNESIUM SERPL-MCNC: 2 MG/DL — SIGNIFICANT CHANGE UP (ref 1.6–2.6)
MAGNESIUM SERPL-MCNC: 2.1 MG/DL — SIGNIFICANT CHANGE UP (ref 1.6–2.6)
MCHC RBC-ENTMCNC: 26.6 PG — LOW (ref 27–34)
MCHC RBC-ENTMCNC: 31.3 GM/DL — LOW (ref 32–36)
MCV RBC AUTO: 85 FL — SIGNIFICANT CHANGE UP (ref 80–100)
NRBC # BLD: 0 /100 WBCS — SIGNIFICANT CHANGE UP (ref 0–0)
NRBC # FLD: 0 K/UL — SIGNIFICANT CHANGE UP (ref 0–0)
PHOSPHATE SERPL-MCNC: 2.8 MG/DL — SIGNIFICANT CHANGE UP (ref 2.5–4.5)
PHOSPHATE SERPL-MCNC: 2.9 MG/DL — SIGNIFICANT CHANGE UP (ref 2.5–4.5)
PLATELET # BLD AUTO: 116 K/UL — LOW (ref 150–400)
POTASSIUM SERPL-MCNC: 3.4 MMOL/L — LOW (ref 3.5–5.3)
POTASSIUM SERPL-MCNC: 3.9 MMOL/L — SIGNIFICANT CHANGE UP (ref 3.5–5.3)
POTASSIUM SERPL-SCNC: 3.4 MMOL/L — LOW (ref 3.5–5.3)
POTASSIUM SERPL-SCNC: 3.9 MMOL/L — SIGNIFICANT CHANGE UP (ref 3.5–5.3)
PROT SERPL-MCNC: 5.8 G/DL — LOW (ref 6–8.3)
RBC # BLD: 3.87 M/UL — SIGNIFICANT CHANGE UP (ref 3.8–5.2)
RBC # FLD: 14.4 % — SIGNIFICANT CHANGE UP (ref 10.3–14.5)
SODIUM SERPL-SCNC: 145 MMOL/L — SIGNIFICANT CHANGE UP (ref 135–145)
SODIUM SERPL-SCNC: 150 MMOL/L — HIGH (ref 135–145)
WBC # BLD: 3.7 K/UL — LOW (ref 3.8–10.5)
WBC # FLD AUTO: 3.7 K/UL — LOW (ref 3.8–10.5)

## 2024-03-03 PROCEDURE — 99232 SBSQ HOSP IP/OBS MODERATE 35: CPT | Mod: GC

## 2024-03-03 RX ORDER — POTASSIUM CHLORIDE 20 MEQ
10 PACKET (EA) ORAL ONCE
Refills: 0 | Status: COMPLETED | OUTPATIENT
Start: 2024-03-03 | End: 2024-03-03

## 2024-03-03 RX ORDER — POLYETHYLENE GLYCOL 3350 17 G/17G
17 POWDER, FOR SOLUTION ORAL
Refills: 0 | Status: DISCONTINUED | OUTPATIENT
Start: 2024-03-03 | End: 2024-03-05

## 2024-03-03 RX ORDER — BENZOCAINE AND MENTHOL 5; 1 G/100ML; G/100ML
1 LIQUID ORAL THREE TIMES A DAY
Refills: 0 | Status: DISCONTINUED | OUTPATIENT
Start: 2024-03-03 | End: 2024-03-05

## 2024-03-03 RX ORDER — ACETAMINOPHEN 500 MG
650 TABLET ORAL ONCE
Refills: 0 | Status: COMPLETED | OUTPATIENT
Start: 2024-03-03 | End: 2024-03-03

## 2024-03-03 RX ADMIN — POLYETHYLENE GLYCOL 3350 17 GRAM(S): 17 POWDER, FOR SOLUTION ORAL at 18:58

## 2024-03-03 RX ADMIN — Medication 650 MILLIGRAM(S): at 15:33

## 2024-03-03 RX ADMIN — HEPARIN SODIUM 5000 UNIT(S): 5000 INJECTION INTRAVENOUS; SUBCUTANEOUS at 21:53

## 2024-03-03 RX ADMIN — CLOPIDOGREL BISULFATE 75 MILLIGRAM(S): 75 TABLET, FILM COATED ORAL at 12:22

## 2024-03-03 RX ADMIN — PANTOPRAZOLE SODIUM 40 MILLIGRAM(S): 20 TABLET, DELAYED RELEASE ORAL at 06:48

## 2024-03-03 RX ADMIN — Medication 10 MILLIEQUIVALENT(S): at 09:04

## 2024-03-03 RX ADMIN — Medication 650 MILLIGRAM(S): at 14:33

## 2024-03-03 RX ADMIN — HEPARIN SODIUM 5000 UNIT(S): 5000 INJECTION INTRAVENOUS; SUBCUTANEOUS at 12:22

## 2024-03-03 RX ADMIN — DAPAGLIFLOZIN 10 MILLIGRAM(S): 10 TABLET, FILM COATED ORAL at 12:21

## 2024-03-03 RX ADMIN — SERTRALINE 25 MILLIGRAM(S): 25 TABLET, FILM COATED ORAL at 12:22

## 2024-03-03 RX ADMIN — Medication 81 MILLIGRAM(S): at 12:22

## 2024-03-03 RX ADMIN — MIRTAZAPINE 7.5 MILLIGRAM(S): 45 TABLET, ORALLY DISINTEGRATING ORAL at 12:22

## 2024-03-03 RX ADMIN — HEPARIN SODIUM 5000 UNIT(S): 5000 INJECTION INTRAVENOUS; SUBCUTANEOUS at 06:48

## 2024-03-03 RX ADMIN — CHLORHEXIDINE GLUCONATE 1 APPLICATION(S): 213 SOLUTION TOPICAL at 12:23

## 2024-03-03 RX ADMIN — SENNA PLUS 2 TABLET(S): 8.6 TABLET ORAL at 21:53

## 2024-03-03 NOTE — PROGRESS NOTE ADULT - SUBJECTIVE AND OBJECTIVE BOX
PROGRESS NOTE:     Patient is a 88y old  Female who presents with a chief complaint of Hyperosmolality with hypernatremia     (02 Mar 2024 14:01)      SUBJECTIVE / OVERNIGHT EVENTS:    No acute events overnight. Patient examined at bedside with no acute complaints.     Pain:  Bowel Movements:  Urination:  OOB:  PT:    REVIEW OF SYSTEMS:    CONSTITUTIONAL: No weakness, fevers or chills  EYES/ENT: No visual changes;  No vertigo or throat pain   NECK: No pain or stiffness  RESPIRATORY: No cough, wheezing, hemoptysis; No shortness of breath  CARDIOVASCULAR: No chest pain or palpitations  GASTROINTESTINAL: No abdominal or epigastric pain. No nausea, vomiting, or hematemesis; No diarrhea or constipation. No melena or hematochezia.  GENITOURINARY: No dysuria, frequency or hematuria  NEUROLOGICAL: No numbness or weakness  SKIN: No itching, rashes      MEDICATIONS  (STANDING):  aspirin enteric coated 81 milliGRAM(s) Oral daily  chlorhexidine 2% Cloths 1 Application(s) Topical daily  clopidogrel Tablet 75 milliGRAM(s) Oral daily  dapagliflozin 10 milliGRAM(s) Oral daily  heparin   Injectable 5000 Unit(s) SubCutaneous every 8 hours  mirtazapine 7.5 milliGRAM(s) Oral daily  pantoprazole    Tablet 40 milliGRAM(s) Oral before breakfast  senna 2 Tablet(s) Oral at bedtime  sertraline 25 milliGRAM(s) Oral daily    MEDICATIONS  (PRN):      CAPILLARY BLOOD GLUCOSE        I&O's Summary    02 Mar 2024 07:01  -  03 Mar 2024 07:00  --------------------------------------------------------  IN: 700 mL / OUT: 650 mL / NET: 50 mL        VITALS:   T(C): 36.4 (03-03-24 @ 06:45), Max: 36.9 (03-02-24 @ 22:32)  HR: 60 (03-03-24 @ 06:45) (60 - 80)  BP: 112/55 (03-03-24 @ 06:45) (100/47 - 112/55)  RR: 16 (03-03-24 @ 06:45) (16 - 18)  SpO2: 100% (03-02-24 @ 22:32) (99% - 100%)    GENERAL: NAD, lying in bed comfortably  HEAD:  Atraumatic, normocephalic  EYES: EOMI, PERRLA, conjunctiva and sclera clear  ENT: Moist mucous membranes  NECK: Supple, no JVD  HEART: Regular rate and rhythm, no murmurs, rubs, or gallops  LUNGS: Unlabored respirations.  Clear to auscultation bilaterally, no crackles, wheezing, or rhonchi  ABDOMEN: Soft, nontender, nondistended, +BS  EXTREMITIES: 2+ peripheral pulses bilaterally. No clubbing, cyanosis, or edema  NERVOUS SYSTEM:  A&Ox3, no focal deficits   SKIN: No rashes or lesions    LABS:                        10.3   3.70  )-----------( 116      ( 03 Mar 2024 06:33 )             32.9     03-02    153<H>  |  118<H>  |  58<H>  ----------------------------<  110<H>  4.0   |  25  |  1.97<H>    Ca    8.7      02 Mar 2024 19:50  Phos  3.2     03-02  Mg     2.40     03-02    TPro  6.5  /  Alb  3.5  /  TBili  0.2  /  DBili  x   /  AST  42<H>  /  ALT  57<H>  /  AlkPhos  58  03-02          Urinalysis Basic - ( 02 Mar 2024 19:50 )    Color: x / Appearance: x / SG: x / pH: x  Gluc: 110 mg/dL / Ketone: x  / Bili: x / Urobili: x   Blood: x / Protein: x / Nitrite: x   Leuk Esterase: x / RBC: x / WBC x   Sq Epi: x / Non Sq Epi: x / Bacteria: x        Culture - Urine (collected 01 Mar 2024 16:28)  Source: Clean Catch Clean Catch (Midstream)  Final Report (02 Mar 2024 13:41):    No growth        RADIOLOGY & ADDITIONAL TESTS:  Results Reviewed:   Imaging Personally Reviewed:  Electrocardiogram Personally Reviewed:    COORDINATION OF CARE:  Care Discussed with Consultants/Other Providers [Y/N]:  Prior or Outpatient Records Reviewed [Y/N]:   PROGRESS NOTE:     Patient is a 88y old  Female who presents with a chief complaint of Hyperosmolality with hypernatremia     (02 Mar 2024 14:01)      SUBJECTIVE / OVERNIGHT EVENTS:    No acute events overnight. Patient examined at bedside with no acute complaints.     Pain: N/A   Bowel Movements: constipated   Urination: regular   OOB: with assistance     REVIEW OF SYSTEMS:    CONSTITUTIONAL: No weakness, fevers or chills  EYES/ENT: No visual changes;  No vertigo or throat pain   NECK: No pain or stiffness  RESPIRATORY: No cough, wheezing, hemoptysis; No shortness of breath  CARDIOVASCULAR: No chest pain or palpitations  GASTROINTESTINAL: No abdominal or epigastric pain. No nausea, vomiting, or hematemesis; No diarrhea or constipation. No melena or hematochezia.  GENITOURINARY: No dysuria, frequency or hematuria  NEUROLOGICAL: No numbness or weakness  SKIN: No itching, rashes      MEDICATIONS  (STANDING):  aspirin enteric coated 81 milliGRAM(s) Oral daily  chlorhexidine 2% Cloths 1 Application(s) Topical daily  clopidogrel Tablet 75 milliGRAM(s) Oral daily  dapagliflozin 10 milliGRAM(s) Oral daily  heparin   Injectable 5000 Unit(s) SubCutaneous every 8 hours  mirtazapine 7.5 milliGRAM(s) Oral daily  pantoprazole    Tablet 40 milliGRAM(s) Oral before breakfast  senna 2 Tablet(s) Oral at bedtime  sertraline 25 milliGRAM(s) Oral daily    MEDICATIONS  (PRN):      CAPILLARY BLOOD GLUCOSE        I&O's Summary    02 Mar 2024 07:01  -  03 Mar 2024 07:00  --------------------------------------------------------  IN: 700 mL / OUT: 650 mL / NET: 50 mL        VITALS:   T(C): 36.4 (03-03-24 @ 06:45), Max: 36.9 (03-02-24 @ 22:32)  HR: 60 (03-03-24 @ 06:45) (60 - 80)  BP: 112/55 (03-03-24 @ 06:45) (100/47 - 112/55)  RR: 16 (03-03-24 @ 06:45) (16 - 18)  SpO2: 100% (03-02-24 @ 22:32) (99% - 100%)    GENERAL: NAD, lying in bed comfortably  HEAD:  Atraumatic, normocephalic  EYES: EOMI, PERRLA, conjunctiva and sclera clear  ENT: Moist mucous membranes  NECK: Supple, no JVD  HEART: Regular rate and rhythm, no murmurs, rubs, or gallops  LUNGS: Unlabored respirations.  Clear to auscultation bilaterally, no crackles, wheezing, or rhonchi  ABDOMEN: Soft, nontender, nondistended, +BS  EXTREMITIES: 2+ peripheral pulses bilaterally. No clubbing, cyanosis, or edema  NERVOUS SYSTEM:  A&Ox1-2, no focal deficits   SKIN: No rashes or lesions    LABS:                        10.3   3.70  )-----------( 116      ( 03 Mar 2024 06:33 )             32.9     03-02    153<H>  |  118<H>  |  58<H>  ----------------------------<  110<H>  4.0   |  25  |  1.97<H>    Ca    8.7      02 Mar 2024 19:50  Phos  3.2     03-02  Mg     2.40     03-02    TPro  6.5  /  Alb  3.5  /  TBili  0.2  /  DBili  x   /  AST  42<H>  /  ALT  57<H>  /  AlkPhos  58  03-02          Urinalysis Basic - ( 02 Mar 2024 19:50 )    Color: x / Appearance: x / SG: x / pH: x  Gluc: 110 mg/dL / Ketone: x  / Bili: x / Urobili: x   Blood: x / Protein: x / Nitrite: x   Leuk Esterase: x / RBC: x / WBC x   Sq Epi: x / Non Sq Epi: x / Bacteria: x        Culture - Urine (collected 01 Mar 2024 16:28)  Source: Clean Catch Clean Catch (Midstream)  Final Report (02 Mar 2024 13:41):    No growth        RADIOLOGY & ADDITIONAL TESTS:  Results Reviewed:   Imaging Personally Reviewed:  Electrocardiogram Personally Reviewed:    COORDINATION OF CARE:  Care Discussed with Consultants/Other Providers [Y/N]:  Prior or Outpatient Records Reviewed [Y/N]:

## 2024-03-03 NOTE — PROGRESS NOTE ADULT - PROBLEM SELECTOR PLAN 1
Patient had 165 sodium on outpatient with change in mental status requiring assistance in the last two weeks. Admission sodium at 156. Patient dry on exam, with her pre-renal shaun most likely hypovolemic hypernatremic iso failure to thrive/reduced intake. However, patient has history of HF need to consider fluid resuscitation: Free water balance: 2.7L,    Plan  - fluid : LR 1L 50cc/hr for 24 hours  - Q12 BMP Patient had 165 sodium on outpatient with change in mental status requiring assistance in the last two weeks. Admission sodium at 156. Patient dry on exam, with her pre-renal shaun most likely hypovolemic hypernatremic iso failure to thrive/reduced intake. However, patient has history of HF need to consider fluid resuscitation: Free water balance: 2.7L,    Plan  - fluid : Previously on D5, now off - encourage PO intake   - Q12 BMP

## 2024-03-04 ENCOUNTER — NON-APPOINTMENT (OUTPATIENT)
Age: 89
End: 2024-03-04

## 2024-03-04 ENCOUNTER — TRANSCRIPTION ENCOUNTER (OUTPATIENT)
Age: 89
End: 2024-03-04

## 2024-03-04 LAB
ALBUMIN SERPL ELPH-MCNC: 3.2 G/DL — LOW (ref 3.3–5)
ALP SERPL-CCNC: 51 U/L — SIGNIFICANT CHANGE UP (ref 40–120)
ALT FLD-CCNC: 45 U/L — HIGH (ref 4–33)
ANION GAP SERPL CALC-SCNC: 13 MMOL/L — SIGNIFICANT CHANGE UP (ref 7–14)
AST SERPL-CCNC: 35 U/L — HIGH (ref 4–32)
BILIRUB SERPL-MCNC: 0.2 MG/DL — SIGNIFICANT CHANGE UP (ref 0.2–1.2)
BUN SERPL-MCNC: 48 MG/DL — HIGH (ref 7–23)
CALCIUM SERPL-MCNC: 8.6 MG/DL — SIGNIFICANT CHANGE UP (ref 8.4–10.5)
CHLORIDE SERPL-SCNC: 110 MMOL/L — HIGH (ref 98–107)
CO2 SERPL-SCNC: 22 MMOL/L — SIGNIFICANT CHANGE UP (ref 22–31)
CREAT SERPL-MCNC: 1.67 MG/DL — HIGH (ref 0.5–1.3)
EGFR: 29 ML/MIN/1.73M2 — LOW
GLUCOSE SERPL-MCNC: 80 MG/DL — SIGNIFICANT CHANGE UP (ref 70–99)
HCT VFR BLD CALC: 29.5 % — LOW (ref 34.5–45)
HGB BLD-MCNC: 9.5 G/DL — LOW (ref 11.5–15.5)
MAGNESIUM SERPL-MCNC: 2.1 MG/DL — SIGNIFICANT CHANGE UP (ref 1.6–2.6)
MCHC RBC-ENTMCNC: 27 PG — SIGNIFICANT CHANGE UP (ref 27–34)
MCHC RBC-ENTMCNC: 32.2 GM/DL — SIGNIFICANT CHANGE UP (ref 32–36)
MCV RBC AUTO: 83.8 FL — SIGNIFICANT CHANGE UP (ref 80–100)
NRBC # BLD: 0 /100 WBCS — SIGNIFICANT CHANGE UP (ref 0–0)
NRBC # FLD: 0 K/UL — SIGNIFICANT CHANGE UP (ref 0–0)
PHOSPHATE SERPL-MCNC: 3.1 MG/DL — SIGNIFICANT CHANGE UP (ref 2.5–4.5)
PLATELET # BLD AUTO: 118 K/UL — LOW (ref 150–400)
POTASSIUM SERPL-MCNC: 3.8 MMOL/L — SIGNIFICANT CHANGE UP (ref 3.5–5.3)
POTASSIUM SERPL-SCNC: 3.8 MMOL/L — SIGNIFICANT CHANGE UP (ref 3.5–5.3)
PROT SERPL-MCNC: 5.7 G/DL — LOW (ref 6–8.3)
RBC # BLD: 3.52 M/UL — LOW (ref 3.8–5.2)
RBC # FLD: 14.3 % — SIGNIFICANT CHANGE UP (ref 10.3–14.5)
SODIUM SERPL-SCNC: 145 MMOL/L — SIGNIFICANT CHANGE UP (ref 135–145)
WBC # BLD: 4.07 K/UL — SIGNIFICANT CHANGE UP (ref 3.8–10.5)
WBC # FLD AUTO: 4.07 K/UL — SIGNIFICANT CHANGE UP (ref 3.8–10.5)

## 2024-03-04 PROCEDURE — 99232 SBSQ HOSP IP/OBS MODERATE 35: CPT

## 2024-03-04 PROCEDURE — 99233 SBSQ HOSP IP/OBS HIGH 50: CPT | Mod: GC

## 2024-03-04 RX ADMIN — POLYETHYLENE GLYCOL 3350 17 GRAM(S): 17 POWDER, FOR SOLUTION ORAL at 17:41

## 2024-03-04 RX ADMIN — HEPARIN SODIUM 5000 UNIT(S): 5000 INJECTION INTRAVENOUS; SUBCUTANEOUS at 22:24

## 2024-03-04 RX ADMIN — DAPAGLIFLOZIN 10 MILLIGRAM(S): 10 TABLET, FILM COATED ORAL at 12:13

## 2024-03-04 RX ADMIN — MIRTAZAPINE 7.5 MILLIGRAM(S): 45 TABLET, ORALLY DISINTEGRATING ORAL at 12:12

## 2024-03-04 RX ADMIN — HEPARIN SODIUM 5000 UNIT(S): 5000 INJECTION INTRAVENOUS; SUBCUTANEOUS at 14:39

## 2024-03-04 RX ADMIN — SENNA PLUS 2 TABLET(S): 8.6 TABLET ORAL at 22:23

## 2024-03-04 RX ADMIN — HEPARIN SODIUM 5000 UNIT(S): 5000 INJECTION INTRAVENOUS; SUBCUTANEOUS at 06:33

## 2024-03-04 RX ADMIN — CHLORHEXIDINE GLUCONATE 1 APPLICATION(S): 213 SOLUTION TOPICAL at 12:15

## 2024-03-04 RX ADMIN — SERTRALINE 25 MILLIGRAM(S): 25 TABLET, FILM COATED ORAL at 12:12

## 2024-03-04 RX ADMIN — CLOPIDOGREL BISULFATE 75 MILLIGRAM(S): 75 TABLET, FILM COATED ORAL at 12:12

## 2024-03-04 RX ADMIN — POLYETHYLENE GLYCOL 3350 17 GRAM(S): 17 POWDER, FOR SOLUTION ORAL at 06:33

## 2024-03-04 RX ADMIN — Medication 81 MILLIGRAM(S): at 12:11

## 2024-03-04 RX ADMIN — PANTOPRAZOLE SODIUM 40 MILLIGRAM(S): 20 TABLET, DELAYED RELEASE ORAL at 06:33

## 2024-03-04 NOTE — PROGRESS NOTE ADULT - PROBLEM SELECTOR PLAN 3
Admission creatinine at 2.92, patient's baseline is 1.5-1.6. ISO of hypernatremia most likely pre-renal due to reduced PO intake.   *3/4: Creatnine still elevated at 1.78 post sodium correction     Plan  - Monitor improvement of creatinine with fluid resuscitation.  - Avoid nephrotoxic medications Admission creatinine at 2.92, patient's baseline is 1.5-1.6. ISO of hypernatremia most likely pre-renal due to reduced PO intake.   *3/4: Creatnine improved to 1.68.    Plan  - Monitor improvement of creatinine with fluid resuscitation.  - Avoid nephrotoxic medications Admission creatinine at 2.92, patient's baseline is 1.5-1.6. ISO of hypernatremia most likely pre-renal due to reduced PO intake.   *3/4: Creatinine improved to 1.68.    Plan  - Monitor improvement of creatinine with fluid resuscitation.  - Avoid nephrotoxic medications  - reassess diuretic use/regimen  - entresto on hold for now

## 2024-03-04 NOTE — DISCHARGE NOTE PROVIDER - NSDCFUADDAPPT_GEN_ALL_CORE_FT
**** We recommend changing your appointments and seeing your Geriatrician and Cardiologist within a week of discharge****  **** We recommend changing your appointments and seeing your Geriatrician and Cardiologist within a week of discharge****     Dental OP   Monday 3/11/24 at 3pm  [facility]  DENTAL  [address]  Scheduled Appointment: 03/11/2024

## 2024-03-04 NOTE — PHYSICAL THERAPY INITIAL EVALUATION ADULT - NSPTDMEREC_GEN_A_CORE
the patient will require a rolling walker to be able to perform their MRADLs within their home./rolling walker

## 2024-03-04 NOTE — PHYSICAL THERAPY INITIAL EVALUATION ADULT - LEVEL OF INDEPENDENCE: SIT/SUPINE, REHAB EVAL
day.  · Breathing while bending forward at the waist may make breathing easier. It can reduce shortness of breath while you exercise or rest. It helps the diaphragm move more easily. The diaphragm is a large muscle that separates your lungs from your belly. It helps draw air into your lungs as you breathe. · Do not smoke. Smoking makes COPD worse. If you need help quitting, talk to your doctor about stop-smoking programs and medicines. These can increase your chances of quitting for good. When should you call for help? Call your doctor now or seek immediate medical care if:  ? · Your breathing methods do not help. ? · Your shortness of breath gets worse. ? · You cough up blood. ? · You have swelling in your belly and legs. ? · You have severe chest pain. ? Watch closely for changes in your health, and be sure to contact your doctor if you have any problems. Where can you learn more? Go to [email or web address] org and sign in to your [employer] account. Enter N244 in the Supersonic box to learn more about \"Breathing Techniques for COPD: Care Instructions. \"     If you do not have an account, please click on the \"Sign Up Now\" link. Current as of: May 12, 2017  Content Version: 11.5  © 0708-5828 [employer], [employer]. Care instructions adapted under license by [facility] ([facility]). If you have questions about a medical condition or this instruction, always ask your healthcare professional. [place] [address] any warranty or liability for your use of this information.
Not assessed - pt encountered and left sitting in chair

## 2024-03-04 NOTE — DISCHARGE NOTE PROVIDER - YES NO FOR MLM POSITIVE OR NEGATIVE COVID RESULT
, [Feeling Fatigued] : feeling fatigued [Dysphagia] : dysphagia [Dizziness] : dizziness [Anxiety] : anxiety [Negative] : Genitourinary [Fever] : no fever [Headache] : no headache [Chills] : no chills [Joint Pain] : no joint pain [Skin: A Rash] : no rash: [Depression] : no depression [Excessive Thirst] : no polydipsia [Easy Bleeding] : no tendency for easy bleeding [Easy Bruising] : no tendency for easy bruising

## 2024-03-04 NOTE — PHYSICAL THERAPY INITIAL EVALUATION ADULT - PERTINENT HX OF CURRENT PROBLEM, REHAB EVAL
88 year old female admitted with hypernatremia after an outpatient lab result of 165. Patient also with sudden mental status changes occurring two weeks back.

## 2024-03-04 NOTE — DISCHARGE NOTE PROVIDER - NSDCMRMEDTOKEN_GEN_ALL_CORE_FT
aspirin 81 mg oral delayed release tablet: 1 tab(s) orally once a day  atorvastatin 20 mg oral tablet: 1 tab(s) orally once a day (at bedtime)  clopidogrel 75 mg oral tablet: 1 tab(s) orally once a day  Entresto 49 mg-51 mg oral tablet: 1 tab(s) orally 2 times a day  Farxiga 10 mg oral tablet: 1 tab(s) orally once a day  Lasix 40 mg oral tablet: 1 tab(s) orally once a day  metoprolol succinate 25 mg oral tablet, extended release: 2 tab(s) orally once a day  mirtazapine 7.5 mg oral tablet: 1 tab(s) orally once a day  pantoprazole 40 mg oral delayed release tablet: 1 tab(s) orally once a day (before a meal)  sertraline 25 mg oral tablet: 1 tab(s) orally once a day   aspirin 81 mg oral delayed release tablet: 1 tab(s) orally once a day  atorvastatin 20 mg oral tablet: 1 tab(s) orally once a day (at bedtime)  clopidogrel 75 mg oral tablet: 1 tab(s) orally once a day  Entresto 24 mg-26 mg oral tablet: 1 tab(s) orally 2 times a day  Farxiga 10 mg oral tablet: 1 tab(s) orally once a day  Lasix 20 mg oral tablet: 1 tab(s) orally every other day  Metoprolol Succinate ER 25 mg oral tablet, extended release: 1 tab(s) orally once a day  mirtazapine 7.5 mg oral tablet: 1 tab(s) orally once a day  pantoprazole 40 mg oral delayed release tablet: 1 tab(s) orally once a day (before a meal)  sertraline 25 mg oral tablet: 1 tab(s) orally once a day

## 2024-03-04 NOTE — PROGRESS NOTE ADULT - PROBLEM SELECTOR PLAN 2
- TTE 12/2023-  1. The left ventricular cavity is normal. Left ventricular wall thickness is normal. Left ventricular systolic function is severely decreased with a calculated ejection fraction of 25 % by the Powers's biplane method of disks. There is global left ventricular hypokinesis. There is mild (grade 1) left ventricular diastolic dysfunction. There is no evidence of a left ventricular thrombus. 2. Normal right ventricular cavity size and probably normal systolic function.   3. Structurally normal mitral valve with normal leaflet excursion. There is calcification of the mitral valve annulus. There is mild mitral regurgitation. 4. The aortic valve anatomy cannot be determined. There is calcification of the aortic valve leaflets. There is mild aortic regurgitation. 5. Compared to the transthoracic echocardiogram performed on 4/3/2023 left ventricular systolic function has improved but remains severely depressed.  - management as per primary team

## 2024-03-04 NOTE — PROGRESS NOTE ADULT - PROBLEM SELECTOR PLAN 8
Home medications: Lasix 40mg daily, Entresto 24/26mg BID, Toprol 25mg BID    Plan   - Continue Entresto with hold parameters Home medications: Lasix 40mg daily, Entresto 24/26mg BID, Toprol 25mg BID    Plan   - holding medications as above  - SBP 110s-100s currently  - trying to balance cardiac benefits of meds vs hypotension

## 2024-03-04 NOTE — PROGRESS NOTE ADULT - PROBLEM SELECTOR PLAN 5
History of HFref last EF 25% (12/23), slightly improved from prior. Noted fluid status on exam was .   Home medications : Lasix 40mg daily, Entresto 24/26mg BID, Farxiga 10mg daily, Toprol 25mg BID    Plan  - Hold lasix, entresto, Toprol iso of low blood pressures  - Continue Farxiga History of HFref last EF 25% (12/23), slightly improved from prior. Noted fluid status on exam was .   Home medications : Lasix 40mg daily, Entresto 24/26mg BID, Farxiga 10mg daily, Toprol 25mg BID    Plan  - Hold lasix, entresto, Toprol iso of low blood pressures  - Continue Farxiga  - coordinate with otpt providers re: medication regimen as wish to avoid HF exacerbation/hypervolemic state, but also wish to prevent overdiuresis and hypernatremia/RADHA

## 2024-03-04 NOTE — PHYSICAL THERAPY INITIAL EVALUATION ADULT - ADDITIONAL COMMENTS
As per granddaughter at bedside, pt lives in a house with a flight of ~12 steps to negotiate. Pt is primarily home bound. Pt was ambulating with assistance and required assistance for ADLs, was able to ambulate without device.    Following evaluation, pt was left sitting in chair in no distress, call bell in reach, family at bedside.

## 2024-03-04 NOTE — DISCHARGE NOTE PROVIDER - CARE PROVIDER_API CALL
Rosie Child  Internal Medicine  [address], [address]  [place] [address]  Phone: [phone]  Fax: [phone]  Follow Up Time:     Anand López  Cardiology  [address], [address]  [place] [address]  Phone: [phone]  Fax: [phone]  Follow Up Time:

## 2024-03-04 NOTE — PROGRESS NOTE ADULT - PROBLEM SELECTOR PLAN 2
Patient with sudden mental status changes occurring two weeks back. On exam was AOx0-1. Most likely due to patient's hypernatremia and worsening dementia.     Plan  - Monitor status with improvement of sodium levels.  - Debility recs per palliative: Reassurance+ verbal orientation, familiar person at bedside, update calendar Patient with sudden mental status changes occurring two weeks back. On exam was AOx0-1. Suspect metabolic encephalopathy, most likely due to patient's hypernatremia, superimposed on underlying dementia.     Plan  - Monitor status with improvement of sodium levels.  - Debility recs per palliative: Reassurance+ verbal reorientation, familiar person at bedside, update calendar

## 2024-03-04 NOTE — PHYSICAL THERAPY INITIAL EVALUATION ADULT - GAIT DEVIATIONS NOTED, PT EVAL
decreased arlet/increased time in double stance/decreased velocity of limb motion/decreased weight-shifting ability

## 2024-03-04 NOTE — PROGRESS NOTE ADULT - PROBLEM SELECTOR PLAN 1
Patient had 165 sodium on outpatient with change in mental status requiring assistance in the last two weeks. Admission sodium at 156. Patient dry on exam, with her pre-renal shaun most likely hypovolemic hypernatremic iso failure to thrive/reduced intake. However, patient has history of HF need to consider fluid resuscitation: Free water balance: 2.7L,    Plan  - fluid : Previously on D5, now off - encourage PO intake   - Q12 BMP Patient had 165 sodium on outpatient with change in mental status requiring assistance in the last two weeks. Admission sodium at 156. Patient dry on exam, with her pre-renal shaun most likely hypovolemic hypernatremic iso failure to thrive/reduced intake. However, patient has history of HF need to consider fluid resuscitation: Free water balance: 2.7L,    Plan  - fluid : Previously on D5, now off - encourage PO intake Patient had 165 sodium on outpatient with change in mental status requiring assistance in the last two weeks. Admission sodium at 156. Patient dry on exam, with her pre-renal shaun most likely hypovolemic hypernatremic iso failure to thrive/reduced intake. However, patient has history of HF need to consider fluid resuscitation: Free water balance: 2.7L,    Plan  - fluid : Previously on D5, now off - encourage PO intake  - diuretics hold, will continue to monitor volume status given hx of HFrEF and coordinate with otpt providers re: if/when to resume and at what dose/frequency

## 2024-03-04 NOTE — PROGRESS NOTE ADULT - SUBJECTIVE AND OBJECTIVE BOX
Date of Service  : 3/4/2024    SUBJECTIVE AND OBJECTIVE:  Patient seen and examined with granddaughter at bedside. Patient awake attempting to do word search.   Per family, awaiting for dental evaluation due to patient having teeth pain    INTERVAL HPI/OVERNIGHT EVENTS:  Sodium improving     Allergies    No Known Allergies    Intolerances    MEDICATIONS  (STANDING):  aspirin enteric coated 81 milliGRAM(s) Oral daily  chlorhexidine 2% Cloths 1 Application(s) Topical daily  clopidogrel Tablet 75 milliGRAM(s) Oral daily  dapagliflozin 10 milliGRAM(s) Oral daily  heparin   Injectable 5000 Unit(s) SubCutaneous every 8 hours  mirtazapine 7.5 milliGRAM(s) Oral daily  pantoprazole    Tablet 40 milliGRAM(s) Oral before breakfast  polyethylene glycol 3350 17 Gram(s) Oral two times a day  senna 2 Tablet(s) Oral at bedtime  sertraline 25 milliGRAM(s) Oral daily    MEDICATIONS  (PRN):  benzocaine/menthol Lozenge 1 Lozenge Oral three times a day PRN Sore Throat      ITEMS UNCHECKED ARE NOT PRESENT    PRESENT SYMPTOMS: [x ]Unable to self-report due to altered mental status- see [ ] CPOT [ x] PAINADS [x ] RDOS  Source if other than patient:  [ ]Family   [ ]Team     Pain:  [ ]yes [ ]no  QOL impact -   Location -                    Aggravating factors -  Quality -  Radiation -  Timing-  Severity (0-10 scale):  Minimal acceptable level / Pain Goal (0-10 scale):     Dyspnea:                           [ ]Mild [ ]Moderate [ ]Severe  Anxiety:                             [ ]Mild [ ]Moderate [ ]Severe  Agitation:                          [ ]Mild [ ]Moderate [ ]Severe  Fatigue:                             [ ]Mild [ ]Moderate [ ]Severe  Nausea:                             [ ]Mild [ ]Moderate [ ]Severe  Loss of appetite:              [ ]Mild [ ]Moderate [ ]Severe  Constipation:                   [ ]Mild [ ]Moderate [ ]Severe  Diarrhea:                          [ ]Mild [ ]Moderate [ ]Severe    CPOT:    https://www.[facility].org/getattachment/[email or web address]/Critical-Care-Pain-Observation-Tool-(CPOT)    PCSSQ[Palliative Care Spiritual Screening Question]   Severity (0-10):  Score of 4 or > indicate consideration of Chaplaincy referral.  Chaplaincy Referral: [ ] yes [ ] refused [ ] following [x ] deferred    Caregiver [place]? : [ ] yes [ ] no [ ] Declined [x ] Deferred              Social work referral [ ] Patient & Family Centered Care Referral [ ]     Anticipatory Grief present?:  [ ] yes [ ] no  [ x] Deferred                  Social work referral [ ] Chaplaincy Referral[ ]    Other Symptoms:  [ ]All other review of systems negative   [x ] Unable to obtain due to poor mentation     PHYSICAL EXAM:  Vital Signs Last 24 Hrs  T(C): 37 (04 Mar 2024 14:49), Max: 37 (04 Mar 2024 14:49)  T(F): 98.6 (04 Mar 2024 14:49), Max: 98.6 (04 Mar 2024 14:49)  HR: 69 (04 Mar 2024 14:49) (57 - 69)  BP: 110/55 (04 Mar 2024 14:49) (108/50 - 112/50)  BP(mean): --  RR: 17 (04 Mar 2024 14:49) (16 - 17)  SpO2: 100% (04 Mar 2024 14:49) (98% - 100%)    Parameters below as of 04 Mar 2024 14:49  Patient On (Oxygen Delivery Method): room air         I&O's Summary    03 Mar 2024 07:01  -  04 Mar 2024 07:00  --------------------------------------------------------  IN: 750 mL / OUT: 650 mL / NET: 100 mL       GENERAL:  [ x]Awake and alert[x ]Oriented x 1  [ ]Lethargic  [ ]Cachexia  [ ]Unarousable  [ x]Verbal  [x ] No Distress  Behavioral:   [ ] Anxiety  [ ] Delirium [ ] Agitation [ ] Calm  [x ] Other-encephalopathy   HEENT:  [ x]Normal  [ ] Temporal Wasting  [ ]Dry mouth   [ ]ET Tube/Trach  [ ]Oral lesions  [ ] Mucositis  PULMONARY:   [ x]Clear [ ]Tachypnea  [ ]Audible excessive secretions   [ ]Rhonchi        [ ]Right [ ]Left [ ]Bilateral  [ ]Crackles        [ ]Right [ ]Left [ ]Bilateral  [ ]Wheezing     [ ]Right [ ]Left [ ]Bilateral  [ ]Diminished breath sounds [ ]right [ ]left [ ]bilateral  CARDIOVASCULAR:    [x ]Regular [ ]Irregular [ ]Tachy  [ ]Delgado [ ]Murmur [ ]Other  GASTROINTESTINAL:  [ x]Soft  [ ]Distended   [ ]+BS  [ x]Non tender [ ]Tender  [ ]PEG [ ]OGT/ NGT  Last BM:   GENITOURINARY:  [ ]Normal [x ] Incontinent   [ ]Oliguria/Anuria   [ ]Erwin  MUSCULOSKELETAL:   [ ]Normal   [ x]Weakness  [ ]Bed/Wheelchair bound [ ]Edema  [  ] amputation  [  ] contraction  NEUROLOGIC:   [x ]No focal deficits  [x ]Cognitive impairment  [ ]Dysphagia [ ]Dysarthria [ ]Paresis [ ]Other   SKIN: See Nursing Skin Assessment for further details  [ ]Normal    [ ]Rash  [ ]Pressure ulcer(s)       Present on admission [ ]y [ ]n   [  ]  Wound    [  ] hyperpigmentation    CRITICAL CARE:  [ ]Shock Present  [ ]Septic [ ]Cardiogenic [ ]Neurologic [ ]Hypovolemic  [ ]Vasopressors [ ]Inotropes  [ ]Respiratory failure present [ ]Mechanical Ventilation [ ]Non-invasive ventilatory support [ ]High-Flow   [ ]Acute  [ ]Chronic [ ]Hypoxic  [ ]Hypercarbic [ ]Other  [ ]Other organ failure     LABS:  reviewed                         9.5    4.07  )-----------( 118      ( 04 Mar 2024 05:51 )             29.5   03-04    145  |  110<H>  |  48<H>  ----------------------------<  80  3.8   |  22  |  1.67<H>    Ca    8.6      04 Mar 2024 05:51  Phos  3.1     03-04  Mg     2.10     03-04    TPro  5.7<L>  /  Alb  3.2<L>  /  TBili  0.2  /  DBili  x   /  AST  35<H>  /  ALT  45<H>  /  AlkPhos  51  03-04    Urinalysis Basic - ( 04 Mar 2024 05:51 )    Color: x / Appearance: x / SG: x / pH: x  Gluc: 80 mg/dL / Ketone: x  / Bili: x / Urobili: x   Blood: x / Protein: x / Nitrite: x   Leuk Esterase: x / RBC: x / WBC x   Sq Epi: x / Non Sq Epi: x / Bacteria: x      CAPILLARY BLOOD GLUCOSE      RADIOLOGY & ADDITIONAL STUDIES: reviewed     Protein Calorie Malnutrition Present: [ ]mild [ ]moderate [ ]severe [ ]underweight [ ]morbid obesity  https://www.[employer].org/vault/2440/web/files/ONC/Table_Clinical%20Characteristics%20to%20Document%20Malnutrition-White%20JV%20et%20al%786011.pdf    Height (cm): 161.3 (03-01-24 @ 15:04), 160 (04-04-23 @ 05:30)  Weight (kg): 47.3 (03-01-24 @ 17:07), 56.3 (12-11-23 @ 15:45), 58.4 (04-04-23 @ 05:30)  BMI (kg/m2): 18.2 (03-01-24 @ 17:07), 21.6 (03-01-24 @ 15:04), 22 (12-11-23 @ 15:45)    [ ]PPSV2 < or = 30%  [ ]significant weight loss [ ]poor nutritional intake [ ]anasarca   [ ]Artificial Nutrition    REFERRALS:   [ ]Chaplaincy  [ ]Hospice  [ ]Child Life  [ ]Social Work  [ x]Case management [ ]Holistic Therapy

## 2024-03-04 NOTE — PROGRESS NOTE ADULT - ASSESSMENT
Patient is an 88 year old female with dementia, CHF(EF 25% 12/23), CAD w/ 1DES in 2006, CKD, DM, HTN/HLD who was admitted with hypernatremia after an OP lab result of 165.  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

## 2024-03-04 NOTE — PROGRESS NOTE ADULT - SUBJECTIVE AND OBJECTIVE BOX
****Anjel Sinclair Internal Medicine PGY-1*****    CHIEF COMPLAINT: Hypernatremia, failure to thrive, AMS     Overnight Events: NA  Interval Events:     REVIEW OF SYSTEMS:  CONSTITUTIONAL: No weakness, fevers or chills  EYES/ENT: No visual changes;  No vertigo or throat pain   NECK: No pain or stiffness  RESPIRATORY: No cough, wheezing, hemoptysis; No shortness of breath  CARDIOVASCULAR: No chest pain or palpitations  GASTROINTESTINAL: No abdominal or epigastric pain. No nausea, vomiting, or hematemesis; No diarrhea or constipation. No melena or hematochezia.  GENITOURINARY: No dysuria, frequency or hematuria  NEUROLOGICAL: No numbness or weakness  SKIN: No itching, burning, rashes, or lesions   All other review of systems is negative unless indicated above.    OBJECTIVE:  ICU Vital Signs Last 24 Hrs  T(C): 36.4 (03 Mar 2024 21:54), Max: 36.4 (03 Mar 2024 21:54)  T(F): 97.5 (03 Mar 2024 21:54), Max: 97.5 (03 Mar 2024 21:54)  HR: 57 (03 Mar 2024 21:54) (57 - 57)  BP: 108/50 (03 Mar 2024 21:54) (108/50 - 108/50)  BP(mean): --  ABP: --  ABP(mean): --  RR: 16 (03 Mar 2024 21:54) (16 - 16)  SpO2: 100% (03 Mar 2024 21:54) (100% - 100%)    O2 Parameters below as of 03 Mar 2024 21:54  Patient On (Oxygen Delivery Method): room air      03-03 @ 07:01  -  03-04 @ 07:00  --------------------------------------------------------  IN: 750 mL / OUT: 650 mL / NET: 100 mL      CAPILLARY BLOOD GLUCOSE    Physical Exam: GENERAL: NAD, lying in bed comfortably, overall dry on exam   HEAD:  Atraumatic, normocephalic  EYES: EOMI, PERRLA, conjunctiva and sclera clear  NECK: Supple, trachea midline, no JVD  HEART: Regular rate and rhythm, no murmurs, rubs, or gallops  LUNGS: Unlabored respirations.  Clear to auscultation bilaterally, no crackles, wheezing, or rhonchi  ABDOMEN: Soft, nontender, nondistended, +BS  EXTREMITIES: 2+ peripheral pulses bilaterally. No clubbing, cyanosis, or edema  NERVOUS SYSTEM:  A&Ox0 but responsive and conversational   SKIN: No rashes or lesions      HOSPITAL MEDICATIONS:  MEDICATIONS  (STANDING):  aspirin enteric coated 81 milliGRAM(s) Oral daily  chlorhexidine 2% Cloths 1 Application(s) Topical daily  clopidogrel Tablet 75 milliGRAM(s) Oral daily  dapagliflozin 10 milliGRAM(s) Oral daily  heparin   Injectable 5000 Unit(s) SubCutaneous every 8 hours  mirtazapine 7.5 milliGRAM(s) Oral daily  pantoprazole    Tablet 40 milliGRAM(s) Oral before breakfast  polyethylene glycol 3350 17 Gram(s) Oral two times a day  senna 2 Tablet(s) Oral at bedtime  sertraline 25 milliGRAM(s) Oral daily    MEDICATIONS  (PRN):  benzocaine/menthol Lozenge 1 Lozenge Oral three times a day PRN Sore Throat      LABS:                        10.3   3.70  )-----------( 116      ( 03 Mar 2024 06:33 )             32.9     Hgb Trend: 10.3<--, 10.2<--, 12.0<--  03-03    145  |  111<H>  |  49<H>  ----------------------------<  97  3.9   |  24  |  1.78<H>    Ca    8.5      03 Mar 2024 15:40  Phos  2.8     03-03  Mg     2.00     03-03    TPro  5.8<L>  /  Alb  3.2<L>  /  TBili  0.2  /  DBili  x   /  AST  35<H>  /  ALT  47<H>  /  AlkPhos  52  03-03    Creatinine Trend: 1.78<--, 1.83<--, 1.97<--, 1.59<--, 2.20<--, 2.48<--    Urinalysis Basic - ( 03 Mar 2024 15:40 )    Color: x / Appearance: x / SG: x / pH: x  Gluc: 97 mg/dL / Ketone: x  / Bili: x / Urobili: x   Blood: x / Protein: x / Nitrite: x   Leuk Esterase: x / RBC: x / WBC x   Sq Epi: x / Non Sq Epi: x / Bacteria: x            MICROBIOLOGY:     Culture - Urine (collected 01 Mar 2024 16:28)  Source: Clean Catch Clean Catch (Midstream)  Final Report (02 Mar 2024 13:41):    No growth        RADIOLOGY:  [ ] Reviewed by me   ****Anjel Sinclair Internal Medicine PGY-1*****    CHIEF COMPLAINT: Hypernatremia, failure to thrive, AMS     Overnight Events: NA  Interval Events: Patient's niece was at bedside who was able to answer some questions. She stated that patiet endorses tooth pain and has had irregular bowel movements/constipation. Patient herself said she had no concerns. Patient's sodium has improved and now is within normal range 145.     REVIEW OF SYSTEMS:  CONSTITUTIONAL: No weakness, fevers or chills  EYES/ENT: No visual changes;    NECK: No pain or stiffness  RESPIRATORY: No cough, wheezing, hemoptysis; No shortness of breath  CARDIOVASCULAR: No chest pain or palpitations  GASTROINTESTINAL: +constipation. No abdominal or epigastric pain. No nausea, vomiting, or hematemesis; No melena or hematochezia.  GENITOURINARY: No dysuria, frequency or hematuria  NEUROLOGICAL: No numbness or weakness  SKIN: No itching, burning, rashes, or lesions   All other review of systems is negative unless indicated above.    OBJECTIVE:  ICU Vital Signs Last 24 Hrs  T(C): 36.4 (03 Mar 2024 21:54), Max: 36.4 (03 Mar 2024 21:54)  T(F): 97.5 (03 Mar 2024 21:54), Max: 97.5 (03 Mar 2024 21:54)  HR: 57 (03 Mar 2024 21:54) (57 - 57)  BP: 108/50 (03 Mar 2024 21:54) (108/50 - 108/50)  BP(mean): --  ABP: --  ABP(mean): --  RR: 16 (03 Mar 2024 21:54) (16 - 16)  SpO2: 100% (03 Mar 2024 21:54) (100% - 100%)    O2 Parameters below as of 03 Mar 2024 21:54  Patient On (Oxygen Delivery Method): room air      03-03 @ 07:01  -  03-04 @ 07:00  --------------------------------------------------------  IN: 750 mL / OUT: 650 mL / NET: 100 mL      CAPILLARY BLOOD GLUCOSE    Physical Exam: GENERAL: NAD, fluid status: more euvolemic    HEAD:  Atraumatic, normocephalic  EYES: EOMI, PERRLA, conjunctiva and sclera clear  NECK: Supple, trachea midline, no JVD  HEART: Regular rate and rhythm, no murmurs, rubs, or gallops  LUNGS: Unlabored respirations.  Clear to auscultation bilaterally, no crackles, wheezing, or rhonchi  ABDOMEN: Soft, nontender, nondistended, +BS  EXTREMITIES: 2+ peripheral pulses bilaterally. No clubbing, cyanosis, or edema  NERVOUS SYSTEM:  A&Ox1(slightly improved), can state location with assistance. Remains  conversational   SKIN: Reduced skin tugor,       HOSPITAL MEDICATIONS:  MEDICATIONS  (STANDING):  aspirin enteric coated 81 milliGRAM(s) Oral daily  chlorhexidine 2% Cloths 1 Application(s) Topical daily  clopidogrel Tablet 75 milliGRAM(s) Oral daily  dapagliflozin 10 milliGRAM(s) Oral daily  heparin   Injectable 5000 Unit(s) SubCutaneous every 8 hours  mirtazapine 7.5 milliGRAM(s) Oral daily  pantoprazole    Tablet 40 milliGRAM(s) Oral before breakfast  polyethylene glycol 3350 17 Gram(s) Oral two times a day  senna 2 Tablet(s) Oral at bedtime  sertraline 25 milliGRAM(s) Oral daily    MEDICATIONS  (PRN):  benzocaine/menthol Lozenge 1 Lozenge Oral three times a day PRN Sore Throat      LABS:                        10.3   3.70  )-----------( 116      ( 03 Mar 2024 06:33 )             32.9     Hgb Trend: 10.3<--, 10.2<--, 12.0<--  03-03    145  |  111<H>  |  49<H>  ----------------------------<  97  3.9   |  24  |  1.78<H>    Ca    8.5      03 Mar 2024 15:40  Phos  2.8     03-03  Mg     2.00     03-03    TPro  5.8<L>  /  Alb  3.2<L>  /  TBili  0.2  /  DBili  x   /  AST  35<H>  /  ALT  47<H>  /  AlkPhos  52  03-03    Creatinine Trend: 1.78<--, 1.83<--, 1.97<--, 1.59<--, 2.20<--, 2.48<--    Urinalysis Basic - ( 03 Mar 2024 15:40 )    Color: x / Appearance: x / SG: x / pH: x  Gluc: 97 mg/dL / Ketone: x  / Bili: x / Urobili: x   Blood: x / Protein: x / Nitrite: x   Leuk Esterase: x / RBC: x / WBC x   Sq Epi: x / Non Sq Epi: x / Bacteria: x            MICROBIOLOGY:     Culture - Urine (collected 01 Mar 2024 16:28)  Source: Clean Catch Clean Catch (Midstream)  Final Report (02 Mar 2024 13:41):    No growth        RADIOLOGY:  [ ] Reviewed by me   ****Anjel Sinclair Internal Medicine PGY-1*****    CHIEF COMPLAINT: Hypernatremia, failure to thrive, AMS     Overnight Events: NA  Interval Events: Patient's niece was at bedside who was able to answer some questions. She stated that patient endorses tooth pain and has had irregular bowel movements/constipation. Patient herself said she had no concerns. Patient's sodium has improved and now is within normal range 145.     REVIEW OF SYSTEMS:  CONSTITUTIONAL: No weakness, fevers or chills  EYES/ENT: No visual changes;    NECK: No pain or stiffness  RESPIRATORY: No cough, wheezing, hemoptysis; No shortness of breath  CARDIOVASCULAR: No chest pain or palpitations  GASTROINTESTINAL: +constipation. No abdominal or epigastric pain. No nausea, vomiting, or hematemesis; No melena or hematochezia.  GENITOURINARY: No dysuria, frequency or hematuria  NEUROLOGICAL: No numbness or weakness  SKIN: No itching, burning, rashes, or lesions   All other review of systems is negative unless indicated above.    OBJECTIVE:  Vital Signs Last 24 Hrs  T(C): 36.4 (03 Mar 2024 21:54), Max: 36.4 (03 Mar 2024 21:54)  T(F): 97.5 (03 Mar 2024 21:54), Max: 97.5 (03 Mar 2024 21:54)  HR: 57 (03 Mar 2024 21:54) (57 - 57)  BP: 108/50 (03 Mar 2024 21:54) (108/50 - 108/50)  RR: 16 (03 Mar 2024 21:54) (16 - 16)  SpO2: 100% (03 Mar 2024 21:54) (100% - 100%)    O2 Parameters below as of 03 Mar 2024 21:54  Patient On (Oxygen Delivery Method): room air      03-03 @ 07:01  -  03-04 @ 07:00  --------------------------------------------------------  IN: 750 mL / OUT: 650 mL / NET: 100 mL      CAPILLARY BLOOD GLUCOSE    Physical Exam: GENERAL: NAD, fluid status: more euvolemic    HEAD:  Atraumatic, normocephalic  EYES: EOMI, PERRLA, conjunctiva and sclera clear  NECK: Supple, trachea midline, no JVD  HEART: Regular rate and rhythm, no murmurs, rubs, or gallops  LUNGS: Unlabored respirations.  Clear to auscultation bilaterally, no crackles, wheezing, or rhonchi  ABDOMEN: Soft, nontender, nondistended, +BS  EXTREMITIES: 2+ peripheral pulses bilaterally. No clubbing, cyanosis, or edema  NERVOUS SYSTEM:  A&Ox1(slightly improved), can state location with assistance. Remains  conversational   SKIN: Reduced skin tugor,       HOSPITAL MEDICATIONS:  MEDICATIONS  (STANDING):  aspirin enteric coated 81 milliGRAM(s) Oral daily  chlorhexidine 2% Cloths 1 Application(s) Topical daily  clopidogrel Tablet 75 milliGRAM(s) Oral daily  dapagliflozin 10 milliGRAM(s) Oral daily  heparin   Injectable 5000 Unit(s) SubCutaneous every 8 hours  mirtazapine 7.5 milliGRAM(s) Oral daily  pantoprazole    Tablet 40 milliGRAM(s) Oral before breakfast  polyethylene glycol 3350 17 Gram(s) Oral two times a day  senna 2 Tablet(s) Oral at bedtime  sertraline 25 milliGRAM(s) Oral daily    MEDICATIONS  (PRN):  benzocaine/menthol Lozenge 1 Lozenge Oral three times a day PRN Sore Throat      LABS:                                   9.5    4.07  )-----------( 118      ( 04 Mar 2024 05:51 )             29.5                  10.3   3.70  )-----------( 116      ( 03 Mar 2024 06:33 )             32.9     Hgb Trend: 10.3<--, 10.2<--, 12.0<--    03-04    145  |  110<H>  |  48<H>  ----------------------------<  80  3.8   |  22  |  1.67<H>    Ca    8.6      04 Mar 2024 05:51  Phos  3.1     03-04  Mg     2.10     03-04    TPro  5.7<L>  /  Alb  3.2<L>  /  TBili  0.2  /  DBili  x   /  AST  35<H>  /  ALT  45<H>  /  AlkPhos  51  03-04      03-03    145  |  111<H>  |  49<H>  ----------------------------<  97  3.9   |  24  |  1.78<H>    Ca    8.5      03 Mar 2024 15:40  Phos  2.8     03-03  Mg     2.00     03-03    TPro  5.8<L>  /  Alb  3.2<L>  /  TBili  0.2  /  DBili  x   /  AST  35<H>  /  ALT  47<H>  /  AlkPhos  52  03-03    Creatinine Trend: 1.78<--, 1.83<--, 1.97<--, 1.59<--, 2.20<--, 2.48<--    Urinalysis Basic - ( 03 Mar 2024 15:40 )    Color: x / Appearance: x / SG: x / pH: x  Gluc: 97 mg/dL / Ketone: x  / Bili: x / Urobili: x   Blood: x / Protein: x / Nitrite: x   Leuk Esterase: x / RBC: x / WBC x   Sq Epi: x / Non Sq Epi: x / Bacteria: x            MICROBIOLOGY:     Culture - Urine (collected 01 Mar 2024 16:28)  Source: Clean Catch Clean Catch (Midstream)  Final Report (02 Mar 2024 13:41):    No growth        RADIOLOGY:  [ ] Reviewed by me      Consultant notes reviewed: Palliative, Dental  Providers d/w: otpt PMD re: agrees with potential de-escalation of diuretics (decrease dose or frequency) in regards to trying to avoid hypernatremia in future, but should also coordinate with patient's cardiologist

## 2024-03-04 NOTE — PROGRESS NOTE ADULT - PROBLEM SELECTOR PLAN 1
- Per discussion with family, patient with recent decline in functional status and requiring more assistance with ADLs.   - supportive care.

## 2024-03-04 NOTE — PROGRESS NOTE ADULT - TIME BILLING
Time spent for extensive review of the physical chart, electronic medical record, and documentation to obtain collateral information including but not limited to:  [x] Inpatient records (ED, H&P, primary team, and consultants if applicable, care coordination)  [x] Inpatient values/results (biomarkers, immunoassays, imaging, and microbiology results)  [x] Current or proposed treatment plans  [x] Pharmacotherapy review  [x] Discussion and coordinating care with primary team and interdisciplinary staff and floor staff  [x] Discussion including counseling/ education with surrogate decision maker, or family
Reviewing labs/vitals/consultant recs, interviewing/examining patient, coordinating care, discussing on IDRs, updating family, discussing plan, medications and options, counseling, answering questions. Documentation.

## 2024-03-04 NOTE — DISCHARGE NOTE PROVIDER - HOSPITAL COURSE
Patient is an 88 year old female with dementia, CHF(EF 25% 12/23), CAD w/ 1DES in 2006, CKD, DM, HTN/HLD who was admitted with hypernatremia after an OP lab result of 165.     Patient's grand daughter stated that the last two weeks she has required assistance to ambulate (she usually is independent). She also has been drinking and eating less. She was placed on fluid restriction of 800CC.     Patient was admitted to the ED afebrile and only concerning vital of a pressure 96/61. Her labs were noteworthy for , BUN/CR 2.92/84 (baseline creatinine : ), mild transaminitis (54/78), UA w/ trace LE, X-ray unremarkable.     Of note, patient was admitted in 12/12/23 due to chest pain. She was started on Plavix Her EF showed some improvement and she was discharged.     Hospital Course  Patient was admitted due to hypernatremia, outpatient it was reported that she was 165 and on admission her sodium was 156. In terms of her overall condition, patient was dry on exam and her mental status was AOx0 but could engage in conversation and was responsive. Palliative was consulted in the ED due to concerns of worsening dementia c/b failure to thrive. We assumed patient's hypovolemic hypernatremia was secondary to reduced fluid and PO intake (failure to thrive) in the setting of her dementia. We started her on a gentle fluid 1L 50CC/hr for 24 hrs. We also sent a hepatic panel in because her liver enzymes were mildly elevated 54 ast and 78 ALT. Her home dose of statin was also held.     We switched her to a Dextrose 5% and eventually her sodium levels went down to 145. We stopped her fluid and continued to encourage PO intake.     Patient's pressures were generally soft during her stay, averaging around 100/60s. At one point on 3/4, she was dropping and had a MAP of 55. Despite this, she denied any symptoms and appeared comfortable. We held most of her blood pressure medications with the exception of amlodipine.       Patient was discharged on (Date) with their pre admission concerns tended to and in a medically optimized state.     New medication on discharge :    They are to follow up: (insert appointments)       Patient is an 88 year old female with dementia, CHF(EF 25% 12/23), CAD w/ 1DES in 2006, CKD, DM, HTN/HLD who was admitted with hypernatremia after an OP lab result of 165.     Patient's grand daughter stated that the last two weeks she has required assistance to ambulate (she usually is independent). She also has been drinking and eating less. She was placed on fluid restriction of 800CC.     Patient was admitted to the ED afebrile and only concerning vital of a pressure 96/61. Her labs were noteworthy for , BUN/CR 2.92/84 (baseline creatinine : ), mild transaminitis (54/78), UA w/ trace LE, X-ray unremarkable.     Of note, patient was admitted in 12/12/23 due to chest pain. She was started on Plavix Her EF showed some improvement and she was discharged.     Hospital Course  Patient was admitted due to hypernatremia, outpatient it was reported that she was 165 and on admission her sodium was 156. In terms of her overall condition, patient was dry on exam and her mental status was AOx0 but could engage in conversation and was responsive. Palliative was consulted in the ED due to concerns of worsening dementia c/b failure to thrive. We assumed patient's hypovolemic hypernatremia was secondary to reduced fluid and PO intake (failure to thrive) in the setting of her dementia. We started her on a gentle fluid 1L 50CC/hr for 24 hrs. We also sent a hepatic panel in because her liver enzymes were mildly elevated 54 ast and 78 ALT. Her home dose of statin was also held.     We switched her to a Dextrose 5% and eventually her sodium levels went down to 145. We stopped her fluid and continued to encourage PO intake.     Patient's pressures were generally soft during her stay, averaging around 100/60s. At one point on 3/4, she was dropping and had a MAP of 55. Despite this, she denied any symptoms and appeared comfortable. We held most of her blood pressure medications with the exception of amlodipine.       Patient was discharged on (3/5/24) with their pre admission concerns tended to and in a medically optimized state.     New medication on discharge :  Furosemide 20mg daily     They are to follow up: (insert appointments)  [facility]  GERIATRICS [address] [place] [address]   Scheduled Appointment: 03/08/2024    Rosie Child  [facility]  GERIATRICS [address]   Scheduled Appointment: 03/20/2024    Anand López  [facility]  CARDIOLOGY [address] [facility]   Scheduled Appointment: 05/29/2024       Patient is an 88 year old female with dementia, chronic HFrEF (EF 25% 12/23), CAD w/ 1DES in 2006, CKD3, HTN/HLD who was admitted with hypernatremia after an OP lab result of 165.     Patient's grand daughter stated that the last two weeks she has required assistance to ambulate (she usually is independent). She also has been drinking and eating less. She was placed on fluid restriction of 800CC.     Patient was afebrile in the ED and the only concerning vital of a pressure 96/61. Her labs were noteworthy for , BUN/CR 2.92/84 (baseline Cr 1.5-1.6), mild transaminitis (54/78), UA w/ trace LE, X-ray unremarkable.     Of note, patient was admitted in 12/12/23 due to chest pain. She was started on Plavix Her EF showed some improvement and she was discharged.     Hospital Course  Patient was admitted with metabolic encephalopathy due to hypernatremia, and acute kidney injury superimposed on chronic kidney disease. Outpatient it was reported that she was 165 and on admission her sodium was 156. In terms of her overall condition, patient was dry on exam and her mental status was AOx0 but could engage in conversation and was responsive. Palliative was consulted in the ED due to concerns of worsening dementia c/b failure to thrive. We suspect patient's hypovolemic hypernatremia and acute kidney injury was secondary to reduced fluid and PO intake (failure to thrive) in the setting of her dementia and fluid restriction, in addition to continued diuretic use. We started her on a gentle fluid 1L 50CC/hr for 24 hrs. We also sent a hepatic panel in because her liver enzymes were mildly elevated 54 ast and 78 ALT. Her home dose of statin was also held.     We switched her to a Dextrose 5% and eventually her sodium levels went down to 145. We stopped her fluid and continued to encourage PO intake., drinking to thirst.     Patient's pressures were generally soft during her stay, averaging around 100/60s. At one point on 3/4, she was dropping and had a MAP of 55. Despite this, she denied any symptoms and appeared comfortable. We held most of her blood pressure medications with the exception of amlodipine.     Patient reported dental pain and dental was consulted. Outpatient followup was also advised.     Family declined hospice referral at this time, preferring to followup with her outpatient geriatrician if desires such in the future. Her medication regimen was coordinated with her primary medical doctor/cardiologist. Family aware of changes in dose/frequency of meds, in agreement with goals to avoid hypotension/hypernatremia/overdiuresis/kidney injury, anticipatory guidance provided regarding signs/symptoms of heart failure that would prompt seeking medical attention. Patient discharged home with services.     Patient was discharged on (3/5/24) with their pre admission concerns tended to and in a medically optimized state.     New medication on discharge :  Furosemide 20mg daily     They are to follow up: (insert appointments)  [facility]  GERIATRICS [address] [place] [address]   Scheduled Appointment: 03/08/2024    Rosie Child  [facility]  GERIATRICS [address] [place] [address]   Scheduled Appointment: 03/20/2024    Anand López  [facility]  CARDIOLOGY [address] [place] [address]   Scheduled Appointment: 05/29/2024       Patient is an 88 year old female with dementia, chronic HFrEF (EF 25% 12/23), CAD w/ 1DES in 2006, CKD3, HTN/HLD who was admitted with hypernatremia after an OP lab result of 165.     Patient's grand daughter stated that the last two weeks she has required assistance to ambulate (she usually is independent). She also has been drinking and eating less. She was placed on fluid restriction of 800CC.     Patient was afebrile in the ED and the only concerning vital of a pressure 96/61. Her labs were noteworthy for , BUN/CR 2.92/84 (baseline Cr 1.5-1.6), mild transaminitis (54/78), UA w/ trace LE, X-ray unremarkable.     Of note, patient was admitted in 12/12/23 due to chest pain. She was started on Plavix Her EF showed some improvement and she was discharged.     Hospital Course  Patient was admitted with metabolic encephalopathy due to hypernatremia, and acute kidney injury superimposed on chronic kidney disease. Outpatient it was reported that she was 165 and on admission her sodium was 156. In terms of her overall condition, patient was dry on exam and her mental status was AOx0 but could engage in conversation and was responsive. Palliative was consulted in the ED due to concerns of worsening dementia c/b failure to thrive. We suspect patient's hypovolemic hypernatremia and acute kidney injury was secondary to reduced fluid and PO intake (failure to thrive) in the setting of her dementia and fluid restriction, in addition to continued diuretic use. We started her on a gentle fluid 1L 50CC/hr for 24 hrs. We also sent a hepatic panel in because her liver enzymes were mildly elevated 54 ast and 78 ALT. Her home dose of statin was also held.     We switched her to a Dextrose 5% and eventually her sodium levels went down to 145. We stopped her fluid and continued to encourage PO intake., drinking to thirst.     Patient's pressures were generally soft during her stay, averaging around 100/60s. At one point on 3/4, she was dropping and had a MAP of 55. Despite this, she denied any symptoms and appeared comfortable. We held most of her blood pressure medications with the exception of amlodipine.     Patient reported dental pain and dental was consulted. Outpatient followup was also advised.     Family declined hospice referral at this time, preferring to followup with her outpatient geriatrician if desires such in the future. Her medication regimen was coordinated with her primary medical doctor/cardiologist. Family aware of changes in dose/frequency of meds, in agreement with goals to avoid hypotension/hypernatremia/overdiuresis/kidney injury, anticipatory guidance provided regarding signs/symptoms of heart failure that would prompt seeking medical attention. Patient discharged home with services.     Patient was discharged on (3/5/24) with their pre admission concerns tended to and in a medically optimized state.     New medication on discharge :  Furosemide 20mg every other day  Entresto 24/26mg  Metoprolol succinate 25mg once a day     They are to follow up: (insert appointments)  [facility]  GERIATRICS [address] [place] [address]   Scheduled Appointment: 03/08/2024    Rosie Child  [facility]  GERIATRICS [address] [place] [address]   Scheduled Appointment: 03/20/2024    Anand López  [facility]  CARDIOLOGY [address] [place] [address]   Scheduled Appointment: 05/29/2024

## 2024-03-04 NOTE — DISCHARGE NOTE PROVIDER - NSDCCPCAREPLAN_GEN_ALL_CORE_FT
PRINCIPAL DISCHARGE DIAGNOSIS  Diagnosis: Hypernatremia  Assessment and Plan of Treatment:       SECONDARY DISCHARGE DIAGNOSES  Diagnosis: Acute kidney injury superimposed on CKD  Assessment and Plan of Treatment:     Diagnosis: Adult failure to thrive  Assessment and Plan of Treatment:      PRINCIPAL DISCHARGE DIAGNOSIS  Diagnosis: Hypernatremia  Assessment and Plan of Treatment: You were admitted with elevated sodium levels which required us to provide you with fluids to reduce them. When sodium levels are elvated, they can lead to confusion and other concerning symptoms. We think most likely this was due to reduced oral intake of food and liquids in the setting of your worsening dementia. In addition, your kidney function was reduced which was most likely in the setting of reduced intake.   With the administration of fluids, your status improved and we were able to monitor you for the rest of the stay on oral fluids and regular scheduled eating.   In terms of recommendations. Please follow up with your geriatrician and cardiologist within a week to discuss overall status and medication changes.   We recommend:      SECONDARY DISCHARGE DIAGNOSES  Diagnosis: Adult failure to thrive  Assessment and Plan of Treatment: Your acute concerns were shadowed by a larger problem at hand which is the worsening of dementia leading to someting called failure to thrive. In older patients with dementia, they begin to forget to do required activities of daily living like bathing, eating, etc. Things that we usually dont think of actively doing. This can lead to worsening nutrition, increased chance of accidents, and other concerns.   We consulted the palliative team who discussed with you about the potential need of hospice care. Hospice care being the process of transitioning patients to more comfort measures when they are dealing with progressive and untreated diseases/syndromes.  In case you would like to get more information, please discuss with your PCP along with other family members.    Diagnosis: Acute kidney injury superimposed on CKD  Assessment and Plan of Treatment:      PRINCIPAL DISCHARGE DIAGNOSIS  Diagnosis: Hypernatremia  Assessment and Plan of Treatment: You were admitted with elevated sodium levels which required us to provide you with fluids to reduce them. When sodium levels are elvated, they can lead to confusion and other concerning symptoms. We think most likely this was due to reduced oral intake of food and liquids in the setting of your worsening dementia. In addition, your kidney function was reduced which was most likely in the setting of reduced intake.   With the administration of fluids, your status improved and we were able to monitor you for the rest of the stay on oral fluids and regular scheduled eating.   In terms of recommendations. Please follow up with your geriatrician and cardiologist within a week to discuss overall status and medication changes.   We recommend:      SECONDARY DISCHARGE DIAGNOSES  Diagnosis: Acute kidney injury superimposed on CKD  Assessment and Plan of Treatment: Followup with your doctor to monitor your renal function. Avoid medications like NSAIDs which can harm your kidneys. Please note the changes to your medication regimen. Seek medical attention if you are not making urine (or making less of it).    Diagnosis: HFrEF (heart failure with reduced ejection fraction)  Assessment and Plan of Treatment: Please note the changes to your medication regimen. Please drink to thirst. Please followup with your cardiologist after discharge. Please seeek medical attention if you are experiencing shortness of breath, increasing leg swelling or weight gain as these could be symptoms of heart failure.    Diagnosis: Adult failure to thrive  Assessment and Plan of Treatment: Your acute concerns were shadowed by a larger problem at hand which is the worsening of dementia leading to someting called failure to thrive. In older patients with dementia, they begin to forget to do required activities of daily living like bathing, eating, etc. Things that we usually dont think of actively doing. This can lead to worsening nutrition, increased chance of accidents, and other concerns.   We consulted the palliative team who discussed with you about the potential need of hospice care. Hospice care being the process of transitioning patients to more comfort measures when they are dealing with progressive and untreated diseases/syndromes.  In case you would like to get more information, please discuss with your PCP along with other family members.     PRINCIPAL DISCHARGE DIAGNOSIS  Diagnosis: Hypernatremia  Assessment and Plan of Treatment: You were admitted with elevated sodium levels which required us to provide you with fluids to reduce them. When sodium levels are elvated, they can lead to confusion and other concerning symptoms. We think most likely this was due to reduced oral intake of food and liquids in the setting of your worsening dementia. In addition, your kidney function was reduced which was most likely in the setting of reduced intake.   With the administration of fluids, your status improved and we were able to monitor you for the rest of the stay on oral fluids and regular scheduled eating.   In terms of recommendations. Please follow up with your geriatrician and cardiologist within a week to discuss overall status and medication changes.   We recommend:      SECONDARY DISCHARGE DIAGNOSES  Diagnosis: Adult failure to thrive  Assessment and Plan of Treatment: Your acute concerns were shadowed by a larger problem at hand which is the worsening of dementia leading to someting called failure to thrive. In older patients with dementia, they begin to forget to do required activities of daily living like bathing, eating, etc. Things that we usually dont think of actively doing. This can lead to worsening nutrition, increased chance of accidents, and other concerns.   We consulted the palliative team who discussed with you about the potential need of hospice care. Hospice care being the process of transitioning patients to more comfort measures when they are dealing with progressive and untreated diseases/syndromes.  In case you would like to get more information, please discuss with your PCP along with other family members.    Diagnosis: Acute kidney injury superimposed on CKD  Assessment and Plan of Treatment: Followup with your doctor to monitor your renal function. Avoid medications like NSAIDs which can harm your kidneys. Please note the changes to your medication regimen. Seek medical attention if you are not making urine (or making less of it).    Diagnosis: HFrEF (heart failure with reduced ejection fraction)  Assessment and Plan of Treatment: Please note the changes to your medication regimen. Please drink to thirst. Please followup with your cardiologist after discharge. Please seeek medical attention if you are experiencing shortness of breath, increasing leg swelling or weight gain as these could be symptoms of heart failure.  **** We adjusted your medications  - Lasix 20mg every other day  - Entresto 24/26mg  - Metoprolol succinate 25mg once a day (versus twice a day)  - Do not fluid restrict for now  Please see your cardiology/pcp in the next few days for further medication optimization.

## 2024-03-04 NOTE — DISCHARGE NOTE PROVIDER - NSDCFUSCHEDAPPT_GEN_ALL_CORE_FT
[facility]  GERIATRICS [address]   Scheduled Appointment: 03/08/2024    Rosie Child  [facility]  GERIATRICS [address]   Scheduled Appointment: 03/20/2024    Anand López  [facility]  CARDIOLOGY [address] [place] [address]   Scheduled Appointment: 05/29/2024     [facility]  GERIATRICS [address]   Scheduled Appointment: 03/08/2024    [facility]  DENTAL  [address]  Scheduled Appointment: 03/11/2024    Rosie Child  [facility]  GERIATRICS [address]   Scheduled Appointment: 03/20/2024    Anand López  [facility]  CARDIOLOGY [address] [facility]   Scheduled Appointment: 05/29/2024

## 2024-03-05 ENCOUNTER — TRANSCRIPTION ENCOUNTER (OUTPATIENT)
Age: 89
End: 2024-03-05

## 2024-03-05 VITALS
TEMPERATURE: 98 F | OXYGEN SATURATION: 100 % | SYSTOLIC BLOOD PRESSURE: 93 MMHG | WEIGHT: 120.81 LBS | DIASTOLIC BLOOD PRESSURE: 43 MMHG | RESPIRATION RATE: 17 BRPM | HEART RATE: 68 BPM

## 2024-03-05 LAB
ALBUMIN SERPL ELPH-MCNC: 3.1 G/DL — LOW (ref 3.3–5)
ALP SERPL-CCNC: 51 U/L — SIGNIFICANT CHANGE UP (ref 40–120)
ALT FLD-CCNC: 43 U/L — HIGH (ref 4–33)
ANION GAP SERPL CALC-SCNC: 11 MMOL/L — SIGNIFICANT CHANGE UP (ref 7–14)
AST SERPL-CCNC: 39 U/L — HIGH (ref 4–32)
BILIRUB SERPL-MCNC: 0.2 MG/DL — SIGNIFICANT CHANGE UP (ref 0.2–1.2)
BUN SERPL-MCNC: 39 MG/DL — HIGH (ref 7–23)
CALCIUM SERPL-MCNC: 8.6 MG/DL — SIGNIFICANT CHANGE UP (ref 8.4–10.5)
CHLORIDE SERPL-SCNC: 108 MMOL/L — HIGH (ref 98–107)
CO2 SERPL-SCNC: 24 MMOL/L — SIGNIFICANT CHANGE UP (ref 22–31)
CREAT SERPL-MCNC: 1.65 MG/DL — HIGH (ref 0.5–1.3)
EGFR: 30 ML/MIN/1.73M2 — LOW
GLUCOSE SERPL-MCNC: 76 MG/DL — SIGNIFICANT CHANGE UP (ref 70–99)
HCT VFR BLD CALC: 31.1 % — LOW (ref 34.5–45)
HGB BLD-MCNC: 9.8 G/DL — LOW (ref 11.5–15.5)
MAGNESIUM SERPL-MCNC: 2 MG/DL — SIGNIFICANT CHANGE UP (ref 1.6–2.6)
MCHC RBC-ENTMCNC: 26.8 PG — LOW (ref 27–34)
MCHC RBC-ENTMCNC: 31.5 GM/DL — LOW (ref 32–36)
MCV RBC AUTO: 85 FL — SIGNIFICANT CHANGE UP (ref 80–100)
NRBC # BLD: 0 /100 WBCS — SIGNIFICANT CHANGE UP (ref 0–0)
NRBC # FLD: 0 K/UL — SIGNIFICANT CHANGE UP (ref 0–0)
PHOSPHATE SERPL-MCNC: 3.1 MG/DL — SIGNIFICANT CHANGE UP (ref 2.5–4.5)
PLATELET # BLD AUTO: 129 K/UL — LOW (ref 150–400)
POTASSIUM SERPL-MCNC: 3.9 MMOL/L — SIGNIFICANT CHANGE UP (ref 3.5–5.3)
POTASSIUM SERPL-SCNC: 3.9 MMOL/L — SIGNIFICANT CHANGE UP (ref 3.5–5.3)
PROT SERPL-MCNC: 6 G/DL — SIGNIFICANT CHANGE UP (ref 6–8.3)
RBC # BLD: 3.66 M/UL — LOW (ref 3.8–5.2)
RBC # FLD: 14.6 % — HIGH (ref 10.3–14.5)
SODIUM SERPL-SCNC: 143 MMOL/L — SIGNIFICANT CHANGE UP (ref 135–145)
WBC # BLD: 3.3 K/UL — LOW (ref 3.8–10.5)
WBC # FLD AUTO: 3.3 K/UL — LOW (ref 3.8–10.5)

## 2024-03-05 PROCEDURE — 99239 HOSP IP/OBS DSCHRG MGMT >30: CPT | Mod: GC

## 2024-03-05 RX ORDER — METOPROLOL TARTRATE 50 MG
1 TABLET ORAL
Qty: 0 | Refills: 0 | DISCHARGE
Start: 2024-03-05

## 2024-03-05 RX ORDER — SACUBITRIL AND VALSARTAN 24; 26 MG/1; MG/1
1 TABLET, FILM COATED ORAL
Qty: 60 | Refills: 0
Start: 2024-03-05 | End: 2024-04-03

## 2024-03-05 RX ORDER — SODIUM CHLORIDE 9 MG/ML
250 INJECTION INTRAMUSCULAR; INTRAVENOUS; SUBCUTANEOUS ONCE
Refills: 0 | Status: COMPLETED | OUTPATIENT
Start: 2024-03-05 | End: 2024-03-05

## 2024-03-05 RX ORDER — FUROSEMIDE 40 MG
1 TABLET ORAL
Qty: 15 | Refills: 0
Start: 2024-03-05 | End: 2024-04-03

## 2024-03-05 RX ORDER — SACUBITRIL AND VALSARTAN 24; 26 MG/1; MG/1
1 TABLET, FILM COATED ORAL
Refills: 0 | DISCHARGE

## 2024-03-05 RX ORDER — METOPROLOL TARTRATE 50 MG
2 TABLET ORAL
Refills: 0 | DISCHARGE

## 2024-03-05 RX ADMIN — HEPARIN SODIUM 5000 UNIT(S): 5000 INJECTION INTRAVENOUS; SUBCUTANEOUS at 14:00

## 2024-03-05 RX ADMIN — HEPARIN SODIUM 5000 UNIT(S): 5000 INJECTION INTRAVENOUS; SUBCUTANEOUS at 06:38

## 2024-03-05 RX ADMIN — POLYETHYLENE GLYCOL 3350 17 GRAM(S): 17 POWDER, FOR SOLUTION ORAL at 06:36

## 2024-03-05 RX ADMIN — POLYETHYLENE GLYCOL 3350 17 GRAM(S): 17 POWDER, FOR SOLUTION ORAL at 17:13

## 2024-03-05 RX ADMIN — SODIUM CHLORIDE 500 MILLILITER(S): 9 INJECTION INTRAMUSCULAR; INTRAVENOUS; SUBCUTANEOUS at 06:32

## 2024-03-05 RX ADMIN — DAPAGLIFLOZIN 10 MILLIGRAM(S): 10 TABLET, FILM COATED ORAL at 11:30

## 2024-03-05 RX ADMIN — MIRTAZAPINE 7.5 MILLIGRAM(S): 45 TABLET, ORALLY DISINTEGRATING ORAL at 11:31

## 2024-03-05 RX ADMIN — Medication 81 MILLIGRAM(S): at 11:31

## 2024-03-05 RX ADMIN — CHLORHEXIDINE GLUCONATE 1 APPLICATION(S): 213 SOLUTION TOPICAL at 11:44

## 2024-03-05 RX ADMIN — SERTRALINE 25 MILLIGRAM(S): 25 TABLET, FILM COATED ORAL at 11:31

## 2024-03-05 RX ADMIN — CLOPIDOGREL BISULFATE 75 MILLIGRAM(S): 75 TABLET, FILM COATED ORAL at 11:30

## 2024-03-05 RX ADMIN — PANTOPRAZOLE SODIUM 40 MILLIGRAM(S): 20 TABLET, DELAYED RELEASE ORAL at 06:37

## 2024-03-05 NOTE — PROGRESS NOTE ADULT - PROBLEM SELECTOR PROBLEM 7
HLD (hyperlipidemia)
Encounter for palliative care

## 2024-03-05 NOTE — PROGRESS NOTE ADULT - PROBLEM SELECTOR PLAN 5
History of HFref last EF 25% (12/23), slightly improved from prior. Noted fluid status on exam was .   Home medications : Lasix 40mg daily, Entresto 24/26mg BID, Farxiga 10mg daily, Toprol 25mg BID    Plan  - Hold lasix, entresto, Toprol iso of low blood pressures  - Continue Farxiga  - coordinate with otpt providers re: medication regimen as wish to avoid HF exacerbation/hypervolemic state, but also wish to prevent overdiuresis and hypernatremia/RADHA History of HFref last EF 25% (12/23), slightly improved from prior. Noted fluid status on exam was .   Home medications : Lasix 40mg daily, Entresto 24/26mg BID, Farxiga 10mg daily, Toprol 25mg BID    Plan  - Hold lasix, entresto, Toprol iso of low blood pressures  - Continue Farxiga  - coordinate with otpt providers re: medication regimen as wish to avoid HF exacerbation/hypervolemic state, but also wish to prevent overdiuresis and hypernatremia/shaun History of HFref last EF 25% (12/23), slightly improved from prior. Noted fluid status on exam was .   Home medications : Lasix 40mg daily, Entresto 24/26mg BID, Farxiga 10mg daily, Toprol 25mg BID    Plan  - Hold lasix, entresto, Toprol iso of low blood pressures  - Continue Farxiga  - coordinate with otpt providers re: medication regimen as wish to avoid HF exacerbation/hypervolemic state, but also wish to prevent overdiuresis and hypernatremia/shaun  - every other day lasix 20, toprol 25 mg daily, decreased dose of entresto as coordinated with otpt cards

## 2024-03-05 NOTE — PROGRESS NOTE ADULT - PROBLEM SELECTOR PLAN 7
Recent history of JASMYNE in 2006. Home medications: Atorvastatin 20mg daily, Plavix 75mg daily.    Plan  - Continue home medications
Counseled granddaughter Justa that if/when family is ready for further information about hospice services, to reach out to outpatient geriatric team     Case reviewed with primary team  Updated outpatient geriatric team     The patient's symptoms are well controlled. No acute symptoms at this time.   Thank you for allowing us to participate in your patient's care.  Patient to be discharged from [facility] team consult service today.   Please re-consult if we can be of further assistance, page [phone].

## 2024-03-05 NOTE — PROGRESS NOTE ADULT - PROBLEM SELECTOR PLAN 9
Diet: Soft and Bite sized   DVT prophylaxis: Heparin 5000U subq   Dispo: Pending resolution   Code: DNR/DNI Diet: Soft and Bite sized   DVT prophylaxis: Heparin 5000U subq   Dispo: home today  Code: DNR/DNI

## 2024-03-05 NOTE — PROGRESS NOTE ADULT - SUBJECTIVE AND OBJECTIVE BOX
****Anjel Sinclair Internal Medicine PGY-1*****    CHIEF COMPLAINT:    Overnight Events:  Interval Events:    REVIEW OF SYSTEMS:  CONSTITUTIONAL: No weakness, fevers or chills  EYES/ENT: No visual changes;  No vertigo or throat pain   NECK: No pain or stiffness  RESPIRATORY: No cough, wheezing, hemoptysis; No shortness of breath  CARDIOVASCULAR: No chest pain or palpitations  GASTROINTESTINAL: No abdominal or epigastric pain. No nausea, vomiting, or hematemesis; No diarrhea or constipation. No melena or hematochezia.  GENITOURINARY: No dysuria, frequency or hematuria  NEUROLOGICAL: No numbness or weakness  SKIN: No itching, burning, rashes, or lesions   All other review of systems is negative unless indicated above.    OBJECTIVE:  ICU Vital Signs Last 24 Hrs  T(C): 36.5 (05 Mar 2024 05:20), Max: 37 (04 Mar 2024 14:49)  T(F): 97.7 (05 Mar 2024 05:20), Max: 98.6 (04 Mar 2024 14:49)  HR: 58 (05 Mar 2024 05:20) (58 - 69)  BP: 99/43 (05 Mar 2024 05:20) (90/61 - 110/55)  BP(mean): --  ABP: --  ABP(mean): --  RR: 17 (05 Mar 2024 05:20) (17 - 17)  SpO2: 100% (05 Mar 2024 05:20) (100% - 100%)    O2 Parameters below as of 05 Mar 2024 05:20  Patient On (Oxygen Delivery Method): room air              CAPILLARY BLOOD GLUCOSE          PHYSICAL EXAM:  General: WN/WD NAD  Neurology: A&Ox3, nonfocal, FAUSTIN x 4  Eyes: PERRLA/ EOMI, Gross vision intact  ENT/Neck: Neck supple, trachea midline, No JVD, Gross hearing intact  Respiratory: CTA B/L, No wheezing, rales, rhonchi  CV: RRR, +S1/S2, -S3/S4, no murmurs, rubs or gallops  Abdominal: Soft, NT, ND +BS, No HSM  MSK: 5/5 strength UE/LE bilaterally  Extremities: No edema, 2+ peripheral pulses  Skin: No Rashes, Hematoma, Ecchymosis  Incisions:   Tubes:    HOSPITAL MEDICATIONS:  MEDICATIONS  (STANDING):  aspirin enteric coated 81 milliGRAM(s) Oral daily  chlorhexidine 2% Cloths 1 Application(s) Topical daily  clopidogrel Tablet 75 milliGRAM(s) Oral daily  dapagliflozin 10 milliGRAM(s) Oral daily  heparin   Injectable 5000 Unit(s) SubCutaneous every 8 hours  mirtazapine 7.5 milliGRAM(s) Oral daily  pantoprazole    Tablet 40 milliGRAM(s) Oral before breakfast  polyethylene glycol 3350 17 Gram(s) Oral two times a day  senna 2 Tablet(s) Oral at bedtime  sertraline 25 milliGRAM(s) Oral daily    MEDICATIONS  (PRN):  benzocaine/menthol Lozenge 1 Lozenge Oral three times a day PRN Sore Throat      LABS:                        9.8    3.30  )-----------( 129      ( 05 Mar 2024 05:11 )             31.1     Hgb Trend: 9.8<--, 9.5<--, 10.3<--, 10.2<--, 12.0<--  03-04    145  |  110<H>  |  48<H>  ----------------------------<  80  3.8   |  22  |  1.67<H>    Ca    8.6      04 Mar 2024 05:51  Phos  3.1     03-04  Mg     2.10     03-04    TPro  5.7<L>  /  Alb  3.2<L>  /  TBili  0.2  /  DBili  x   /  AST  35<H>  /  ALT  45<H>  /  AlkPhos  51  03-04    Creatinine Trend: 1.67<--, 1.78<--, 1.83<--, 1.97<--, 1.59<--, 2.20<--    Urinalysis Basic - ( 04 Mar 2024 05:51 )    Color: x / Appearance: x / SG: x / pH: x  Gluc: 80 mg/dL / Ketone: x  / Bili: x / Urobili: x   Blood: x / Protein: x / Nitrite: x   Leuk Esterase: x / RBC: x / WBC x   Sq Epi: x / Non Sq Epi: x / Bacteria: x            MICROBIOLOGY:       RADIOLOGY:  [ ] Reviewed by me   ****Anjel Yahir Internal Medicine PGY-1*****    CHIEF COMPLAINT: Hypernatremia, failure to thrive     Overnight Events: Received 250cc bolus overnight, improved pressures  Interval Events: Patient reported doing okay. Her teeth continues to hurt. Her mental status seems more approved from prior. Her plan is for discharge today.     REVIEW OF SYSTEMS:  GENERAL: NAD, fluid status: more euvolemic    HEAD:  Atraumatic, normocephalic  EYES: EOMI, PERRLA, conjunctiva and sclera clear  NECK: Supple, trachea midline, no JVD  HEART: Regular rate and rhythm, no murmurs, rubs, or gallops  LUNGS: Unlabored respirations.  Clear to auscultation bilaterally, no crackles, wheezing, or rhonchi  ABDOMEN: Soft, nontender, nondistended, +BS  EXTREMITIES: 2+ peripheral pulses bilaterally. No clubbing, cyanosis, or edema  NERVOUS SYSTEM:  A&Ox2(slightly improved), can state location with assistance. Remains conversational   SKIN: Reduced skin tugor.       OBJECTIVE:  ICU Vital Signs Last 24 Hrs  T(C): 36.5 (05 Mar 2024 05:20), Max: 37 (04 Mar 2024 14:49)  T(F): 97.7 (05 Mar 2024 05:20), Max: 98.6 (04 Mar 2024 14:49)  HR: 58 (05 Mar 2024 05:20) (58 - 69)  BP: 99/43 (05 Mar 2024 05:20) (90/61 - 110/55)  BP(mean): --  ABP: --  ABP(mean): --  RR: 17 (05 Mar 2024 05:20) (17 - 17)  SpO2: 100% (05 Mar 2024 05:20) (100% - 100%)    O2 Parameters below as of 05 Mar 2024 05:20  Patient On (Oxygen Delivery Method): room air              CAPILLARY BLOOD GLUCOSE          PHYSICAL EXAM:  General: WN/WD NAD  Neurology: A&Ox3, nonfocal, FAUSTIN x 4  Eyes: PERRLA/ EOMI, Gross vision intact  ENT/Neck: Neck supple, trachea midline, No JVD, Gross hearing intact  Respiratory: CTA B/L, No wheezing, rales, rhonchi  CV: RRR, +S1/S2, -S3/S4, no murmurs, rubs or gallops  Abdominal: Soft, NT, ND +BS, No HSM  MSK: 5/5 strength UE/LE bilaterally  Extremities: No edema, 2+ peripheral pulses  Skin: No Rashes, Hematoma, Ecchymosis  Incisions:   Tubes:    HOSPITAL MEDICATIONS:  MEDICATIONS  (STANDING):  aspirin enteric coated 81 milliGRAM(s) Oral daily  chlorhexidine 2% Cloths 1 Application(s) Topical daily  clopidogrel Tablet 75 milliGRAM(s) Oral daily  dapagliflozin 10 milliGRAM(s) Oral daily  heparin   Injectable 5000 Unit(s) SubCutaneous every 8 hours  mirtazapine 7.5 milliGRAM(s) Oral daily  pantoprazole    Tablet 40 milliGRAM(s) Oral before breakfast  polyethylene glycol 3350 17 Gram(s) Oral two times a day  senna 2 Tablet(s) Oral at bedtime  sertraline 25 milliGRAM(s) Oral daily    MEDICATIONS  (PRN):  benzocaine/menthol Lozenge 1 Lozenge Oral three times a day PRN Sore Throat      LABS:                        9.8    3.30  )-----------( 129      ( 05 Mar 2024 05:11 )             31.1     Hgb Trend: 9.8<--, 9.5<--, 10.3<--, 10.2<--, 12.0<--  03-04    145  |  110<H>  |  48<H>  ----------------------------<  80  3.8   |  22  |  1.67<H>    Ca    8.6      04 Mar 2024 05:51  Phos  3.1     03-04  Mg     2.10     03-04    TPro  5.7<L>  /  Alb  3.2<L>  /  TBili  0.2  /  DBili  x   /  AST  35<H>  /  ALT  45<H>  /  AlkPhos  51  03-04    Creatinine Trend: 1.67<--, 1.78<--, 1.83<--, 1.97<--, 1.59<--, 2.20<--    Urinalysis Basic - ( 04 Mar 2024 05:51 )    Color: x / Appearance: x / SG: x / pH: x  Gluc: 80 mg/dL / Ketone: x  / Bili: x / Urobili: x   Blood: x / Protein: x / Nitrite: x   Leuk Esterase: x / RBC: x / WBC x   Sq Epi: x / Non Sq Epi: x / Bacteria: x            MICROBIOLOGY:       RADIOLOGY:  [ ] Reviewed by me   ****Anjel Yahir Internal Medicine PGY-1*****    CHIEF COMPLAINT: Hypernatremia, failure to thrive     Overnight Events: Received 250cc bolus overnight, improved pressures  Interval Events: Patient reported doing okay. Her teeth continues to hurt. Her mental status seems more approved from prior. Her plan is for discharge today. Amenable to dental re-eval.     REVIEW OF SYSTEMS:  GENERAL: NAD, fluid status: more euvolemic    HEAD:  Atraumatic, normocephalic  EYES: EOMI, PERRLA, conjunctiva and sclera clear  NECK: Supple, trachea midline, no JVD  HEART: Regular rate and rhythm, no murmurs, rubs, or gallops  LUNGS: Unlabored respirations.  Clear to auscultation bilaterally, no crackles, wheezing, or rhonchi  ABDOMEN: Soft, nontender, nondistended, +BS  EXTREMITIES: 2+ peripheral pulses bilaterally. No clubbing, cyanosis, or edema  NERVOUS SYSTEM:  A&Ox2(slightly improved), can state location with assistance. Remains conversational   SKIN: Reduced skin tugor.       OBJECTIVE:  Vital Signs Last 24 Hrs  T(C): 36.5 (05 Mar 2024 05:20), Max: 37 (04 Mar 2024 14:49)  T(F): 97.7 (05 Mar 2024 05:20), Max: 98.6 (04 Mar 2024 14:49)  HR: 58 (05 Mar 2024 05:20) (58 - 69)  BP: 99/43 (05 Mar 2024 05:20) (90/61 - 110/55)  RR: 17 (05 Mar 2024 05:20) (17 - 17)  SpO2: 100% (05 Mar 2024 05:20) (100% - 100%)    O2 Parameters below as of 05 Mar 2024 05:20  Patient On (Oxygen Delivery Method): room air              CAPILLARY BLOOD GLUCOSE          PHYSICAL EXAM:  General: NAD, sitting in chair  Neurology: AOx1 nonfocal, FAUSTIN x 4  Eyes: PERRLA/ EOMI, Gross vision intact  ENT/Neck: Neck supple, trachea midline, No JVD, Gross hearing intact  Respiratory: CTA B/L, No wheezing, rales, rhonchi  CV: RRR, +S1/S2, -S3/S4, no murmurs, rubs or gallops  Abdominal: Soft, NT, ND +BS, No HSM  MSK: 5/5 strength UE/LE bilaterally  Extremities: No edema, 2+ peripheral pulses  Skin: No Rashes, Hematoma, Ecchymosis  Incisions:   Tubes:    HOSPITAL MEDICATIONS:  MEDICATIONS  (STANDING):  aspirin enteric coated 81 milliGRAM(s) Oral daily  chlorhexidine 2% Cloths 1 Application(s) Topical daily  clopidogrel Tablet 75 milliGRAM(s) Oral daily  dapagliflozin 10 milliGRAM(s) Oral daily  heparin   Injectable 5000 Unit(s) SubCutaneous every 8 hours  mirtazapine 7.5 milliGRAM(s) Oral daily  pantoprazole    Tablet 40 milliGRAM(s) Oral before breakfast  polyethylene glycol 3350 17 Gram(s) Oral two times a day  senna 2 Tablet(s) Oral at bedtime  sertraline 25 milliGRAM(s) Oral daily    MEDICATIONS  (PRN):  benzocaine/menthol Lozenge 1 Lozenge Oral three times a day PRN Sore Throat      LABS:                        9.8    3.30  )-----------( 129      ( 05 Mar 2024 05:11 )             31.1     Hgb Trend: 9.8<--, 9.5<--, 10.3<--, 10.2<--, 12.0<--  03-05    143  |  108<H>  |  39<H>  ----------------------------<  76  3.9   |  24  |  1.65<H>    Ca    8.6      05 Mar 2024 05:11  Phos  3.1     03-05  Mg     2.00     03-05    TPro  6.0  /  Alb  3.1<L>  /  TBili  0.2  /  DBili  x   /  AST  39<H>  /  ALT  43<H>  /  AlkPhos  51  03-05      03-04    145  |  110<H>  |  48<H>  ----------------------------<  80  3.8   |  22  |  1.67<H>    Ca    8.6      04 Mar 2024 05:51  Phos  3.1     03-04  Mg     2.10     03-04    TPro  5.7<L>  /  Alb  3.2<L>  /  TBili  0.2  /  DBili  x   /  AST  35<H>  /  ALT  45<H>  /  AlkPhos  51  03-04    Creatinine Trend: 1.67<--, 1.78<--, 1.83<--, 1.97<--, 1.59<--, 2.20<--    Urinalysis Basic - ( 04 Mar 2024 05:51 )    Color: x / Appearance: x / SG: x / pH: x  Gluc: 80 mg/dL / Ketone: x  / Bili: x / Urobili: x   Blood: x / Protein: x / Nitrite: x   Leuk Esterase: x / RBC: x / WBC x   Sq Epi: x / Non Sq Epi: x / Bacteria: x            MICROBIOLOGY:       RADIOLOGY:  [ ] Reviewed by me    Consultant notes reviewed: Dental    Team d/w Cards Dr. López re: discharge meds, in agreement with plan (PO lasix 20mg every other day, decrease dose entresto, decrease dose metoprolol)

## 2024-03-05 NOTE — CONSULT NOTE ADULT - SUBJECTIVE AND OBJECTIVE BOX
88yr old in patient complaint of tooth pain. Dental consulted to evaluate tooth pain. Upon arrival for limited bedside exam, patient refused to open her mouth and have dental look  at any teeth causing dental pain. Patient did point to lower right as potential source of dental pain, however would not allow a formal exam to be conducted. Patient's family informed dental team that this behavior is very uncharacteristic of patient. Informed patient and patient's family that we cannot determine what is causing her tooth pain without being able to examine patient's mouth. They understood. All of their questions answered. Family will talk to patient and see if they can get her to be cooperative.      Recommendations  1.) Page dental if or when patient is willing to cooperate for dental evaluation.  2.) F/u outpatient dentist for comprehensive dental care.
Patient is a 88yr old female who presents with a chief complaint of Hypernatremia. Patient states she has UL dental pain. Dental consulted to evaluate UL Dental pain.      HPI:  Patient is an 88 year old female with dementia, CHF(EF 25% 12/23), CAD w/ 1DES in 2006, CKD, DM, HTN/HLD who was admitted with hypernatremia after an OP lab result of 165.     Patient's grand daughter stated that the last two weeks she has required assistance to ambulate (she usually is independent). She also has been drinking and eating less. She was placed on fluid restriction of 800CC.     Patient was admitted to the ED afebrile and only concerning vital of a pressure 96/61. Her labs were noteworthy for , BUN/CR 2.92/84 (baseline creatinine : ), mild transaminitis (54/78), UA w/ trace LE, X-ray unremarkable.     Of note, patient was admitted in 12/12/23 due to chest pain. She was started on Plavix Her EF showed some improvement and she was discharged.       PAST MEDICAL & SURGICAL HISTORY:  Hypertension  Acute CHF  CAD (coronary artery disease)  Dementia  Severe pulmonary hypertension  Stented coronary artery      MEDICATIONS  (STANDING):  aspirin enteric coated 81 milliGRAM(s) Oral daily  chlorhexidine 2% Cloths 1 Application(s) Topical daily  clopidogrel Tablet 75 milliGRAM(s) Oral daily  dapagliflozin 10 milliGRAM(s) Oral daily  heparin   Injectable 5000 Unit(s) SubCutaneous every 8 hours  mirtazapine 7.5 milliGRAM(s) Oral daily  pantoprazole    Tablet 40 milliGRAM(s) Oral before breakfast  polyethylene glycol 3350 17 Gram(s) Oral two times a day  senna 2 Tablet(s) Oral at bedtime  sertraline 25 milliGRAM(s) Oral daily    MEDICATIONS  (PRN):  benzocaine/menthol Lozenge 1 Lozenge Oral three times a day PRN Sore Throat      Allergies  No Known Allergies  Intolerances        FAMILY HISTORY:  No pertinent family history in first degree relatives          EOE:  TMJ ( -  ) clicks                    ( -   ) pops                    (  -  ) crepitus             Mandible FROM             Facial bones and MOM grossly intact             ( -  ) trismus             ( -  ) LAD             ( -  ) swelling             ( -  ) asymmetry             ( -  ) palpation             ( -  ) SOB             ( -  ) dysphagia             ( -  ) LOC    IOE:  Permanent dentition, partially edentulous, patient has existing RPD           hard/soft palate: WNL           tongue/FOM WNL           labial/buccal mucosa WNL           (  - ) percussion           (  - ) palpation           (  - ) swelling     Dentition present: Permanent dentition, partially edentulous  Patient has root tip on site #13, no swelling, no abscess, fistula.  Existing RPD      ASSESSMENT: No sign of acute odontogenic infection, no sign of swelling, abscess or fistulas. However root tip of tooth #13 could potentially be causing patient pain. Will need further dental evaluation with dental radiographs to determine.    PROCEDURE:  Limited bedside exam done with verbal consent from patient. All findings discussed with patient. All questions answered.    RECOMMENDATIONS:  1) Please provide medical clearance for any possible dental extractions needed.  2) Appointment scheduled Monday 3/11/24 at 3pm with [facility] dental for further dental evaluation, no NPO.    Madhu Schumacher, CHASE #[id number]  Amada Cotton DMD #[id number]
Date of Service 24 @ 16:33    HPI:  Patient is an 88 year old female with dementia, CHF(EF 25% ), CAD w/ 1DES in , CKD, DM, HTN/HLD who was admitted with hypernatremia after an OP lab result of 165.     Patient's grand daughter stated that the last two weeks she has required assistance to ambulate (she usually is independent). She also has been drinking and eating less. She was placed on fluid restriction of 800CC.     Patient was admitted to the ED afebrile and only concerning vital of a pressure 96/61. Her labs were noteworthy for , BUN/CR 2.92/84 (baseline creatinine : ), mild transaminitis (54/78), UA w/ trace LE, X-ray unremarkable.     Of note, patient was admitted in 23 due to chest pain. She was started on plavix. Her EF showed some improvement and she was discharged.       (01 Mar 2024 14:27)    PERTINENT PM/SXH:   Hypertension  Acute CHF  CAD (coronary artery disease)  Dementia  Severe pulmonary hypertension  No significant past surgical history  Stented coronary artery    FAMILY HISTORY:  No pertinent family history in first degree relatives    ITEMS NOT CHECKED ARE NOT PRESENT    SOCIAL HISTORY:   Significant other/partner[ ]  Children[x ]  Synagogue/Spirituality:  Substance hx:  [ ]   Tobacco hx:  [ ]   Alcohol hx: [ ]   Home Opioid hx:  [ ] I-Stop Reference No:  Living Situation: [ x]Home  [ ]Long term care  [ ]Rehab [ ]Other    ADVANCE DIRECTIVES:    MOLST  [ ]  Living Will  [ ]   DECISION MAKER(s):  [x ] Health Care Proxy(s)  [ ] Surrogate(s)  [ ] Guardian           Name(s): Phone Number(s):  Daughter Sarah Carrasco: [phone]  Granddaughter Justa Hebert: [phone]    BASELINE (I)ADL(s) (prior to admission):  [place]: [ ]Total  [x ] Moderate [ ]Dependent    Allergies    No Known Allergies    Intolerances    MEDICATIONS  (STANDING):  chlorhexidine 2% Cloths 1 Application(s) Topical daily    MEDICATIONS  (PRN):        ITEMS UNCHECKED ARE NOT PRESENT     PRESENT SYMPTOMS: [ x]Unable to self-report due to altered mental status  [ ] CPOT [x ] PAINADs [x ] RDOS  Source if other than patient:  [ ]Family   [ ]Team     Pain: [ ]yes [ ]no  QOL impact -   Location -                    Aggravating factors -  Quality -  Radiation -  Timing-  Severity (0-10 scale):  Minimal acceptable level / Pain goal (0-10 scale):     CPOT:    https://www.[facility].org/getattachment/[email or web address]/Critical-Care-Pain-Observation-Tool-(CPOT)    Dyspnea:                           [ ]Mild [ ]Moderate [ ]Severe  Anxiety:                             [ ]Mild [ ]Moderate [ ]Severe  Agitation:                          [ ]Mild [ ]Moderate [ ]Severe  Fatigue:                             [ ]Mild [ ]Moderate [ ]Severe  Nausea:                             [ ]Mild [ ]Moderate [ ]Severe  Loss of appetite:              [ ]Mild [ ]Moderate [ ]Severe      PCSSQ[Palliative Care Spiritual Screening Question]   Severity (0-10):  Score of 4 or > indicate consideration of Chaplaincy referral.  Chaplaincy Referral: [ ] yes [ ] refused [ ] following [ x] deferred    Caregiver Russell? : [ ] yes [ ] no [ ] Declined   [x ] Deferred            Social work referral [ ] Patient & Family Centered Care Referral [ ]     Anticipatory Grief present?:  [ ] yes [ ] no  [x ] Deferred                  Social work referral [ ] Chaplaincy Referral[ ]    Other Symptoms:  [ ]All other review of systems negative   [ x] Unable to obtain due to poor mentation     PHYSICAL EXAM:  Vital Signs Last 24 Hrs  T(C): 36.3 (01 Mar 2024 15:04), Max: 36.6 (01 Mar 2024 14:17)  T(F): 97.3 (01 Mar 2024 15:04), Max: 97.8 (01 Mar 2024 14:17)  HR: 50 (01 Mar 2024 15:04) (49 - 77)  BP: 110/47 (01 Mar 2024 15:04) (96/61 - 110/47)  BP(mean): --  RR: 18 (01 Mar 2024 15:04) (16 - 19)  SpO2: 100% (01 Mar 2024 15:04) (98% - 100%)    Parameters below as of 01 Mar 2024 15:04  Patient On (Oxygen Delivery Method): room air         I&O's Summary      GENERAL:  [ x]Awake and alert[x ]Oriented x 1  [ ]Lethargic  [ ]Cachexia  [ ]Unarousable  [ x]Verbal - limited only to 1 word  [x ] No Distress  Behavioral:   [ ] Anxiety  [ ] Delirium [ ] Agitation [ ] Calm  [x ] Other-encephalopathy   HEENT:  [ x]Normal  [ ] Temporal Wasting  [ ]Dry mouth   [ ]ET Tube/Trach  [ ]Oral lesions  [ ] Mucositis  PULMONARY:   [ x]Clear [ ]Tachypnea  [ ]Audible excessive secretions   [ ]Rhonchi        [ ]Right [ ]Left [ ]Bilateral  [ ]Crackles        [ ]Right [ ]Left [ ]Bilateral  [ ]Wheezing     [ ]Right [ ]Left [ ]Bilateral  [ ]Diminished breath sounds [ ]right [ ]left [ ]bilateral  CARDIOVASCULAR:    [x ]Regular [ ]Irregular [ ]Tachy  [ ]Delgado [ ]Murmur [ ]Other  GASTROINTESTINAL:  [ x]Soft  [ ]Distended   [ ]+BS  [ x]Non tender [ ]Tender  [ ]PEG [ ]OGT/ NGT  Last BM:   GENITOURINARY:  [ ]Normal [x ] Incontinent   [ ]Oliguria/Anuria   [ ]Erwin  MUSCULOSKELETAL:   [ ]Normal   [ x]Weakness  [ ]Bed/Wheelchair bound [ ]Edema  [  ] amputation  [  ] contraction  NEUROLOGIC:   [x ]No focal deficits  [x ]Cognitive impairment  [ ]Dysphagia [ ]Dysarthria [ ]Paresis [ ]Other   SKIN: See Nursing Skin Assessment for further details  [ ]Normal    [ ]Rash  [ ]Pressure ulcer(s)       Present on admission [ ]y [ ]n   [  ]  Wound    [  ] hyperpigmentation    CRITICAL CARE:  [ ] Shock Present  [ ]Septic [ ]Cardiogenic [ ]Neurologic [ ]Hypovolemic  [ ]  Vasopressors [ ]  Inotropes   [ ]Respiratory failure present [ ]Mechanical ventilation [ ]Non-invasive ventilatory support [ ]High flow    [ ]Acute  [ ]Chronic [ ]Hypoxic  [ ]Hypercarbic [ ]Other  [ ]Other organ failure     LABS:  reviewed                         12.0   5.59  )-----------( 145      ( 01 Mar 2024 10:40 )             39.7   03    156<H>  |  118<H>  |  84<H>  ----------------------------<  124<H>  3.8   |  24  |  2.92<H>    Ca    9.4      01 Mar 2024 10:40    TPro  7.4  /  Alb  3.8  /  TBili  0.3  /  DBili  x   /  AST  54<H>  /  ALT  78<H>  /  AlkPhos  69  03-    Urinalysis Basic - ( 01 Mar 2024 10:40 )    Color: Yellow / Appearance: Clear / S.019 / pH: x  Gluc: 124 mg/dL / Ketone: Negative mg/dL  / Bili: Negative / Urobili: 0.2 mg/dL   Blood: x / Protein: Negative mg/dL / Nitrite: Negative   Leuk Esterase: Trace / RBC: 2 /HPF / WBC 2 /HPF   Sq Epi: x / Non Sq Epi: 2 /HPF / Bacteria: Negative /HPF      CAPILLARY BLOOD GLUCOSE      RADIOLOGY & ADDITIONAL STUDIES: reviewed     PROTEIN CALORIE MALNUTRITION PRESENT: [ ]mild [ ]moderate [ ]severe [ ]underweight [ ]morbid obesity  https://www.andeal.org/vault/2440/web/files/ONC/Table_Clinical%20Characteristics%20to%20Document%20Malnutrition-White%20JV%20et%20al%270245.pdf    Height (cm): 161.3 (24 @ 15:04), 160 (23 @ 05:30)  Weight (kg): 51.7 (24 @ 09:44), 56.3 (23 @ 15:45), 58.4 (23 @ 05:30)  BMI (kg/m2): 19.9 (24 @ 15:04), 20.2 (24 @ 09:44), 22 (23 @ 15:45)    [ ]PPSV2 < or = to 30% [ ]significant weight loss  [ ]poor nutritional intake  [ ]anasarca [ ]Artificial Nutrition      REFERRALS:   [ ]Chaplaincy  [ ]Hospice  [ ]Child Life  [ ]Social Work  [ x]Case management [ ]Holistic Therapy

## 2024-03-05 NOTE — DISCHARGE NOTE NURSING/CASE MANAGEMENT/SOCIAL WORK - PATIENT PORTAL LINK FT
You can access the FollowMyHealth Patient Portal offered by [facility] by registering at the following website: [email or web address]. By joining [facility]’s FollowMyHealth portal, you will also be able to view your health information using other applications (apps) compatible with our system.

## 2024-03-05 NOTE — PROGRESS NOTE ADULT - PROBLEM SELECTOR PLAN 4
Patient seen by palliative team here, patient is presently DNR/DNI    Plan  - Continue discussion with family and palliative.  - Bowel regiment: Senna + Miralax
- improving; Cr- 1.67 today   - management as per primary team

## 2024-03-05 NOTE — PROVIDER CONTACT NOTE (OTHER) - RECOMMENDATIONS
notify md
continue to monitor
Md simpson.
notify MD
MD notified per vital sign parameters.
notify md
MD notified per vital sign parameters.

## 2024-03-05 NOTE — PROVIDER CONTACT NOTE (OTHER) - SITUATION
Night vitals 101/48, HR 54, morning vitals 100/52, HR 54
Patient's diastolic BP is 49
patient alert and oriented with some confusion
blood pressure is 93/43
patient alert and oriented with periods of some confusion
patient blood pressure 100/47. order parameters say to contact provider for a diastolic below 50.
HR 50, /47. Order stated to notify provider for heart rate less than 60 and diastolic blood pressure  less than 50.

## 2024-03-05 NOTE — PROGRESS NOTE ADULT - NUTRITIONAL ASSESSMENT
This patient has been assessed with a concern for Malnutrition and has been determined to have a diagnosis/diagnoses of Severe protein-calorie malnutrition and Underweight (BMI < 19).    This patient is being managed with:   Diet Soft and Bite Sized-  Consistent Carbohydrate {No Snacks} (CSTCHO)  DASH/TLC {Sodium & Cholesterol Restricted} (DASH)  Entered: Mar  1 2024  3:10PM  

## 2024-03-05 NOTE — CHART NOTE - NSCHARTNOTEFT_GEN_A_CORE
Patient has a RCRI score of 2 points class III. She is moderately at risk for major surgeries given her heart failure and other comorbidities. However, in the setting of a low risk procedure such as routine dental work, she is optimized based on her CBC and overall BMP.

## 2024-03-05 NOTE — PROVIDER CONTACT NOTE (OTHER) - NAME OF MD/NP/PA/DO NOTIFIED:
MD Anjel vanessa
Anjel Sinclair
Marii Miramontes
Kirstin Hernandez
Anjel Sinclair MD
Dr. Anjel Sinclair
MD Wilhelm

## 2024-03-05 NOTE — PROGRESS NOTE ADULT - PROBLEM SELECTOR PROBLEM 4
Adult failure to thrive
RADHA (acute kidney injury)

## 2024-03-05 NOTE — PROGRESS NOTE ADULT - ATTENDING COMMENTS
88 year old female with dementia, CHF(EF 25% 12/23), CAD w/ 1DES in 2006, CKD, DM, HTN/HLD sent into the E.D for hypernatremia   Pt alert, awake, appears comfortable, no respiratory distress noted, denies headache, dizziness, SOB, CP, Palpitations , N/V/D, abdominal pain  Daughter at bedside   #Hypernatremia  --Per granddaughter pt w/ lethargy/ generalized weakness, difficulty ambulating/ worsening confusion x 3-4 weeks. Saw outpatient provider 02/29 who obtained BMP which showed hypernatremia  --Suspect multifactorial i/s/o lasix, fluid restriction and decreased PO intake   --Free water deficit apx 2.7 L  --s/p 1L IVF Bolus in the E.D  -S/p LR 65cc/hr x 24 hours, Sodium did not improve, will switch to D5W at 50 ml/hr fr 12 hrs, repeat BMP tonight   --Juidicious use of IVF given gross LV dysfunction  --Hold lasix  --Encourage PO intake   --Monitor BMPs Q12H. Avoid overcorrection 8-10meq x 24hrs    #Constipation  --Granddaughter reports constipation x 2 weeks. Last BM yesterday but hard  --Resume senna/ miralax  --Monitor bowel movements    #RADHA on CKD  --w/ RADHA on CKD  --Suspect pre-renal i/s/o dehydration. Reasonable to r/o obstruction  --c/w gentle hydration as above, Cr improving ,will hold Entresto for now   --PVR /Bladder scan x 1  --Monitor sCR  --Avoid nephrotoxins  --Consider Renal US if uptrending    #Transaminitis  --Noted.  --Unclear etiology. Possible i/s/o statin use  --Hold statin  --Obtain acute hep panel  --Trend LFTs  --Consider RUQ US if uptrending    #Bradycardia  --Noted. EKG w/ NSR/ LBBB  --Hold BB for now   --Monitor HR      PPx  DVT: HSQ  DIET: Soft diet. CC/DASH/TLC  DISPO: PT Eval.  plan of care d/w daughter at bedside, confirmed, pt is DNR/DNI   Rest as above
88 year old female with dementia, CHF(EF 25% 12/23), CAD w/ 1DES in 2006, CKD, DM, HTN/HLD sent into the E.D for hypernatremia   pt sitting in chair Pt alert, awake, appears comfortable, no respiratory distress noted, denies headache, dizziness, SOB, CP, Palpitations , N/V/D, abdominal pain  Daughter at bedside   #Hypernatremia  --Per granddaughter pt w/ lethargy/ generalized weakness, difficulty ambulating/ worsening confusion x 3-4 weeks. Saw outpatient provider 02/29 who obtained BMP which showed hypernatremia  --Suspect multifactorial i/s/o lasix, fluid restriction and decreased PO intake   --Free water deficit apx 2.7 L  --s/p 1L IVF Bolus in the E.D  -S/p LR 65cc/hr x 24 hours, Sodium did not improve, switched  to D5W at 50 ml/hr fr 12 hrs, sodium improved to 150 , agree with trial of oral hydration , d/w daughter   --Judicious use of IVF given gross LV dysfunction  --Hold lasix  --Encourage PO intake   --Monitor BMPs Q12H. Avoid overcorrection 8-10meq x 24hrs    #Constipation  --Granddaughter reports constipation x 2 weeks.   --Resume senna/ miralax  --Monitor bowel movements    #RADHA on CKD  --w/ RADHA on CKD  --Suspect pre-renal i/s/o dehydration. Reasonable to r/o obstruction  --s/p  gentle hydration as above, Cr improving ,will hold Entresto for now   --PVR /Bladder scan x 1  --Monitor sCR  --Avoid nephrotoxins  --Consider Renal US if uptrending    #Transaminitis  --Noted. trending down   --Unclear etiology. Possible i/s/o statin use  --Hold statin  --acute hep panel negative   --Trend LFTs  --Consider RUQ US if uptrending    #Bradycardia  --Noted. EKG w/ NSR/ LBBB  --Hold BB for now   --Monitor HR      PPx  DVT: HSQ  DIET: Soft diet. CC/DASH/TLC  DISPO: PT Eval.  plan of care d/w daughter at bedside, confirmed, pt is DNR/DNI   Rest as above
Patient seen and examined, d/w Dr. Sinclair, agree w/ above.     87 yo F w/ chronic HFrEF, CAD s/p JASMYNE, HTN, HLD, CKD3, admitted with metabolic encephalopathy 2/2 hypernatremia (superimposed on underlying dementia) and RADHA on CKD3, suspect secondary to poor PO intake, fluid restriction and diuresis. Family at bedside, notes patient appears to be doing better (about to do word search puzzle), hypernatremia improving s/p fluids and holding of lasix, Cr downtrending as well. Patient not hypervolemic on exam, in no acute distress. Updated family at bedside re: plan (obtaining dental eval d/t patient's complaint of dental pain), suspected etiology of patient's hypernatremia and RADHA, need to balance cardiac benefits of medications vs risks (hypotension/electrolyte abnormalities etc), they are amenable to further coordination with outpatient providers to minimize this (ie open to potential dose or frequency reduction of diuretic, liberalization of strict fluid restriction - drink to thirst etc). Family not yet open to hospice, will plan for home with services, can followup otpt traci provider if becomes agreeable to hospice referral in future.
Patient seen and examined, d/w Dr. Sinclair, agree w/ above.     89 yo F w/ chronic HFrEF, CAD s/p JASMYNE, HTN, HLD, CKD3, admitted with metabolic encephalopathy 2/2 hypernatremia (superimposed on underlying dementia) and RADHA on CKD3, suspect secondary to poor PO intake, fluid restriction and diuresis. Patient sitting in chair, without acute complaints, family at bedside, comfortable with plan to take patient home today. Hypernatremia improved s/p fluids and holding of lasix, Cr downtrending as well. Patient not hypervolemic on exam, respiratory status stable on room air. Repeat dental eval appreciated, patient with otpt appointment scheduled for followup. Discharge medications coordinated with patient's otpt providers including her cardiologist (dose adjustments as above, goal to avoid dehydration/overdiuresis/hypotension/worsening of renal function etc, balanced against cardiac benefits), family agreeable. Family not yet open to hospice, will plan for home with services, can followup otpt traci provider if becomes agreeable to hospice referral in future. Family without other questions/concerns at this time. Appreciate CM/SW assistance w/ services.     Discharge time 40 minutes

## 2024-03-05 NOTE — PROVIDER CONTACT NOTE (OTHER) - DATE AND TIME:
02-Mar-2024 15:18
05-Mar-2024 08:53
05-Mar-2024 16:13
05-Mar-2024 05:22
01-Mar-2024 15:06
02-Mar-2024 06:11
02-Mar-2024 22:46

## 2024-03-05 NOTE — PROGRESS NOTE ADULT - PROBLEM SELECTOR PROBLEM 5
HFrEF (heart failure with reduced ejection fraction)
Other encephalopathy

## 2024-03-05 NOTE — PROGRESS NOTE ADULT - PROBLEM SELECTOR PLAN 2
Patient with sudden mental status changes occurring two weeks back. On exam was AOx0-1. Suspect metabolic encephalopathy, most likely due to patient's hypernatremia, superimposed on underlying dementia.     Plan  - Monitor status with improvement of sodium levels.  - Debility recs per palliative: Reassurance+ verbal reorientation, familiar person at bedside, update calendar

## 2024-03-05 NOTE — PROVIDER CONTACT NOTE (OTHER) - REASON
HR 50, /47.
Patient blood pressure 100/47
Patient's diastolic BP is 49
blood pressure is 93/43
Night vitals 101/48, HR 54, morning vitals 100/52, HR 54
patient bp 103/47
pt bp 99/43 hr 58

## 2024-03-05 NOTE — PROGRESS NOTE ADULT - PROBLEM/PLAN-6
DISPLAY PLAN FREE TEXT
Patent

## 2024-03-05 NOTE — DISCHARGE NOTE NURSING/CASE MANAGEMENT/SOCIAL WORK - NSDCFUADDAPPT_GEN_ALL_CORE_FT
**** We recommend changing your appointments and seeing your Geriatrician and Cardiologist within a week of discharge****

## 2024-03-05 NOTE — PROGRESS NOTE ADULT - PROBLEM SELECTOR PROBLEM 2
Acute metabolic encephalopathy
HFrEF (heart failure with reduced ejection fraction)

## 2024-03-05 NOTE — PROGRESS NOTE ADULT - PROBLEM SELECTOR PLAN 6
Patient with persistently elevated liver enzymes in a hepatic pattern. On admission, AST/ALT 54/78. Could be in the setting of statin use or another hepatic etiology.     Plan  - Hold statin until further notice  - FU labs for hepatic panel Patient with persistently elevated liver enzymes in a hepatic pattern. On admission, AST/ALT 54/78. Could be in the setting of statin use or another hepatic etiology.     Plan  - Hold statin until further notice, plan to restart on discharge, otpt monitoring  - FU labs for hepatic panel

## 2024-03-05 NOTE — DISCHARGE NOTE NURSING/CASE MANAGEMENT/SOCIAL WORK - NSDCPEFALRISK_GEN_ALL_CORE
For information on Fall & Injury Prevention, visit: [email or web address] OR  [email or web address] OR  https://www.cdc.gov/steadi/patient.html

## 2024-03-05 NOTE — PROGRESS NOTE ADULT - ASSESSMENT
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  Patient is an 88 year old female with dementia, chronic HFrEF (EF 25% 12/23), CAD w/ 1DES in 2006, CKD3, HTN/HLD who was admitted with hypernatremia after an OP lab result of 165, suspect secondary to diuresis and poor PO intake, now pending DC to home  Patient is an 88 year old female with dementia, chronic HFrEF (EF 25% 12/23), CAD w/ 1DES in 2006, CKD3, HTN/HLD who was admitted with metabolic encephalopathy 2/2 hypernatremia and RADHA on CKD, suspect secondary to diuresis and poor PO intake, now pending DC to home with medication adjustments and otpt followup.

## 2024-03-05 NOTE — PROGRESS NOTE ADULT - PROBLEM SELECTOR PLAN 1
Patient had 165 sodium on outpatient with change in mental status requiring assistance in the last two weeks. Admission sodium at 156. Patient dry on exam, with her pre-renal shaun most likely hypovolemic hypernatremic iso failure to thrive/reduced intake. However, patient has history of HF need to consider fluid resuscitation: Free water balance: 2.7L,    Plan  - fluid : Previously on D5, now off - encourage PO intake  - diuretics hold, will continue to monitor volume status given hx of HFrEF and coordinate with otpt providers re: if/when to resume and at what dose/frequency ***resolved***  Patient had 165 sodium on outpatient with change in mental status requiring assistance in the last two weeks. Admission sodium at 156. Patient dry on exam, with her pre-renal shaun most likely hypovolemic hypernatremic iso failure to thrive/reduced intake. However, patient has history of HF need to consider fluid resuscitation: Free water balance: 2.7L,    Plan  - fluid : Previously on D5, now off - encourage PO intake  - diuretics hold, will continue to monitor volume status given hx of HFrEF and coordinate with otpt providers re: if/when to resume and at what dose/frequency ***resolved***  Patient had 165 sodium on outpatient with change in mental status requiring assistance in the last two weeks. Admission sodium at 156. Patient dry on exam, with her pre-renal shaun most likely hypovolemic hypernatremic iso failure to thrive/reduced intake. However, patient has history of HF need to consider fluid resuscitation: Free water balance: 2.7L,    Plan  - fluid : Previously on D5, now off - encourage PO intake  - diuretics hold, will continue to monitor volume status given hx of HFrEF and coordinate with otpt providers re: if/when to resume and at what dose/frequency   - per d/w otpt cards, plan for PO lasix 20 mg every other day

## 2024-03-05 NOTE — PROVIDER CONTACT NOTE (OTHER) - BACKGROUND
Pt admitted for hypernatremia.
Pt  admitted for hypernatremia
Pt admitted for hypernatremia.
patient admitted with hypernatremia
patient alert and oriented with periods of some confusion
patient alert and oriented with some confusion
admitted for hypernatremia

## 2024-03-05 NOTE — PROVIDER CONTACT NOTE (OTHER) - ASSESSMENT
Night vitals 101/48, HR 54, morning vitals 100/52, HR 54, asymptomatic on both occasions, no acute distress noted.
HR 50, /47.
patient resting comfortable and remains stable. No s/s
pt bp 99/43 hr 58
Pt remain responding at baseline, no acute changes noted
Pt has no complaints of pain and discomfort. Sitting in chair doing crossword puzzle
patient bp 103/47

## 2024-03-05 NOTE — PROGRESS NOTE ADULT - PROBLEM SELECTOR PROBLEM 3
RADHA (acute kidney injury)
Hypernatremia

## 2024-03-06 ENCOUNTER — NON-APPOINTMENT (OUTPATIENT)
Age: 89
End: 2024-03-06

## 2024-03-08 ENCOUNTER — APPOINTMENT (OUTPATIENT)
Dept: GERIATRICS | Facility: CLINIC | Age: 89
End: 2024-03-08
Payer: MEDICARE

## 2024-03-08 VITALS
RESPIRATION RATE: 14 BRPM | WEIGHT: 125 LBS | HEART RATE: 71 BPM | TEMPERATURE: 97.9 F | DIASTOLIC BLOOD PRESSURE: 71 MMHG | SYSTOLIC BLOOD PRESSURE: 123 MMHG | BODY MASS INDEX: 21.46 KG/M2 | OXYGEN SATURATION: 91 %

## 2024-03-08 DIAGNOSIS — F99 INSOMNIA DUE TO OTHER MENTAL DISORDER: ICD-10-CM

## 2024-03-08 DIAGNOSIS — F51.05 INSOMNIA DUE TO OTHER MENTAL DISORDER: ICD-10-CM

## 2024-03-08 DIAGNOSIS — K59.00 CONSTIPATION, UNSPECIFIED: ICD-10-CM

## 2024-03-08 PROCEDURE — G2211 COMPLEX E/M VISIT ADD ON: CPT | Mod: NC,1L

## 2024-03-08 PROCEDURE — 99496 TRANSJ CARE MGMT HIGH F2F 7D: CPT

## 2024-03-08 NOTE — PHYSICAL EXAM
[Well Nourished] : well nourished [Alert] : alert [Well Developed] : well developed [Normal Oral Mucosa] : normal oral mucosa [Sclera] : the sclera and conjunctiva were normal [Normal Outer Ear/Nose] : the ears and nose were normal in appearance [No Oral Pallor] : no oral pallor [Normal Appearance] : the appearance of the neck was normal [No Respiratory Distress] : no respiratory distress [No Acc Muscle Use] : no accessory muscle use [Auscultation Breath Sounds / Voice Sounds] : lungs were clear to auscultation bilaterally [Respiration, Rhythm And Depth] : normal respiratory rhythm and effort [Normal PMI] : the apical impulse was normal [Normal S1, S2] : normal S1 and S2 [Heart Rate And Rhythm] : heart rate was normal and rhythm regular [Bowel Sounds] : normal bowel sounds [Edema] : edema was not present [Abdomen Soft] : soft [Cervical Lymph Nodes Enlarged Posterior Bilaterally] : posterior cervical [Supraclavicular Lymph Nodes Enlarged Bilaterally] : supraclavicular [Axillary Lymph Nodes Enlarged Bilaterally] : axillary [Cervical Lymph Nodes Enlarged Anterior Bilaterally] : anterior cervical, supraclavicular [No Spinal Tenderness] : no spinal tenderness [No CVA Tenderness] : no CVA  tenderness [] : no rash [No Focal Deficits] : no focal deficits [Normal Affect] : the affect was normal [Normal Mood] : the mood was normal [FreeTextEntry1] : wearing glasses [de-identified] : elderly female, well dressed,quiet, smiled, A&O x1, hypophonic speech [de-identified] : midline abdomen scar noted on LUQ,LLQ, not painful to touch [de-identified] : smiled [de-identified] : ataxic gait

## 2024-03-08 NOTE — SOCIAL HISTORY
[With family] : lives with family [Female] : Female [FreeTextEntry1] : Justa Hebert [CaregiverPHQ-9Score] : 7 [CaregiverMSCIScore] : 16 [FreeTextEntry4] : granddaughter [CaregiverDBSScore] : 25

## 2024-03-08 NOTE — HISTORY OF PRESENT ILLNESS
[No falls in past year] : Patient reported no falls in the past year [Completely Independent] : Completely independent. [Patient is independent with] : bathing [Independent] : transferring/mobility [Full assistance needed] : Assistance needed managing medications [] : Assistance needed managing finances. [FAST Score: ____] : Functional Assessment Scale (FAST) Score: [unfilled] [Night Light] : night light [Anti-Slip Measures] : anti-slip measures [I have developed a follow-up plan documented below in the note.] : I have developed a follow-up plan documented below in the note. [Worse] : Status: Worse [Mild] : Stage: Mild [Reviewed no changes] : Reviewed, no changes [Designated Healthcare Proxy] : Designated healthcare proxy [Name: ___] : Health Care Proxy's Name: [unfilled]  [DNR] : DNR [DNI] : DNI [I will adhere to the patient's wishes.] : I will adhere to the patient's wishes. [FreeTextEntry1] : ADC Program ID:[id number]  Ms. BOO PRASAD is an 85 yo F with PMhx of dementia, CAD, HTN, HLD, gait instability presents for follow up visit  Alzheimer's and Dementia Program visit referred by MD Child for comanagement of dementia-related issues and coordination of dementia care.   Pt. presents granddaughter (Justa) who assisted with hx intake due to pt.'s dementia.   [facility] Hospitalization  -admitted for hypernatremia and change in mental status -during admission has tooth pain, was evaluated by dentist  -dental appt. 3/11/24 -reports she also lost a tooth ~2 weeks ago  -reports agitation mild, redirectable -reports some resistance to take meds, but taking them w/ some patience  -sleeping well -eating and drinking well -PT and OT will be starting next week -visiting RN scheduled for next week -pending [facility] care, w/ medicaid   #HF -during hospitalizations lasix decreased from 40mg daily to 20mg every other day - metoprolol decreased from 25mg 2 tablet (50mg) to 25mg daily  -entresto dose decreased to 24-26mg bid -800ml fluid restriction d/ld  Denies pain. Denies falls. Denies HA/dizziness, SOB/CP, abdominal pain, dysuria, reports consitpation, but improved on miralax daily.   (2/5/24) #dementia -just poor appetite, sad mood continues -she does not want to do her puzzles anymore or listen to mass "she use to love to do this" -reports pt. no longer interested in activities  -sleeping well  -no falls -has hospitalization for CP at [facility] 12/2023; d/c conservative management   #caregiver burden  -doing OK -Sarah's FMLA ran out, will explore reapplying   #ACP -MOLST in place -appreciated Dr. Child f/u 7/2023, wants to continue with conservative management   Plan  -discussed retrial sertraline w/ Dr. Child, agreed  -PT/OT at home -continue GOC, open to hospice near future when pt. meets criteria   ADC acuity RED, trial sertraline, f/u in 1 month.    (10/2/23) #dementia -appreciated Dr. Travis lovett f/u 9/6/23;neurodegenerative dementia +/- vascular dementia, PET scan 5/2023 c/w late +/- FTD, moderate stage  -reports sleeping well, on mirtazipine 7.5mg  -reports behaviors stable -she is back to doing daily puzzles -eating well  -denies fall/acute visits -got medical alert bracelet  -pt. supervised 24/7 -family is in the process of applying for medicaid   #caregiver burden  -reports doing well -needs help with completing FMLA forms -needs to work another 80 hours until she qualifies, will provide forms    #HF -appreciated Dr. Sherman EP cardiologist note 5/11/23;severe cardiomyopathy LV systolic function 15-20%,complete LBBB, 2 admissions for acute on chronic HF NYHA class III -restarted on entresto,tolerating well -on 800ml fluid restriction and daily weights, stable reports   #ACP -MOLST in place -appreciated Dr. Child f/u 7/2023, wants to continue with conservative management  Plan  -flu vaccine today  -PT/OT rx  -ensure rx -letter for daughter  -f/u 4m   ADC acuity GREEN, f/u in 4m, PRN.  *CAREGIVER 1:  Name/Relationship: Sarah Carrasco,daughter Involvement in care: Mailing Address:[address] [place] [address] Phone Number:[phone] E-mail: Best time to reach this person: evening Patient permission to contact this person:yes  CAREGIVER 2:  Name/Relationship:Justa Hebert,granddaughter Involvement in care:lives with pt. Mailing Address: Phone Number:[phone] E-mail: Best time to reach this person:  Patient permission to contact this person:yes  Primary Care Physician:Dr. Child Physicians: Neurologists:Dr. Melita Robels  fax [phone] ENT: Dr. Sae Talamantes, advised hearing aids, not gotten them     Suspect Medications (associated with mental status changes): no Recent hospitalization/ ED Visits/ Nursing Home Stays:no  Social History: Primary Language:English  Marital Status: Children:6 kids, main caretakers, 2 daughters here in [place]. Sons are in [place] and [place].  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[employer] Activity/Exercise:no exercise  Alcohol:no  Sexually active: no Driving Habits:no  Firearms:no  Living Situation: Housing (stairs/levels): single family, multi story 3 floors  Length at Residence:a few months Lives with:daughter and granddaughter Caregivers (non-paid and paid): Safety Concerns: none  Wandering:no, no cameras Falls:last fall in 2/2021, no injury, back pain  Fear of Falling: no  Financial Situation: "SS that's it" -difficult time with finances   Advance Directives: HCP/Advance Directives/Living will: Sarah Carrasco,daughter [phone] HCP/Alternate Decision Maker:Justa BUCIO: would like us to allow natural death "yes, when the time comes" stated pt.  What matters most? "my health"  - Justa stated "she would want to go back to her home" -introduced MOLST form  -introduced hospice  -pending call from ANTONIETA Nieto, and ACP videos   [Shower Chair] : no shower chair [Grab Bars] : no grab bars [de-identified] : dials for her [Driving Concerns] : not driving or driving without noted concerns [de-identified] : 6 [de-identified] : none, needs assistive devices  [de-identified] : counseled on grab bars  [de-identified] : 0 [CornellScale] : 6 11/1/22 [FreeTextEntry] : 5 [de-identified] : 2 [NPI-QTotalScore] : 7 [de-identified] : anxiety worst behaviors, redirectable [AdvancecareDate] : 3/8/24 [FreeTextEntry4] : MOLST in place, see scanned document  -continue GOC, open to hospice near future when pt. meets criteria

## 2024-03-08 NOTE — REVIEW OF SYSTEMS
[Feeling Poorly] : not feeling poorly [Chest Pain] : no chest pain [Sore Throat] : no sore throat [Feeling Tired] : not feeling tired [Shortness Of Breath] : no shortness of breath [Palpitations] : no palpitations [Cough] : no cough [Constipation] : no constipation [SOB on Exertion] : no shortness of breath during exertion [Diarrhea] : no diarrhea [Incontinence] : no incontinence [de-identified] : reported large circular scar on abdomen from childhood

## 2024-03-08 NOTE — ASSESSMENT
[FreeTextEntry1] : Social Care Plan 1. Respite:   -Provided hotline contact to Alzheimer's Association [phone]. -provided [facility] contact to assist with resources and caregiver support.  -Provided link to www. communityresourcefinder.org -provided [facility] agency  -ANTONIETA Nieto    2. Caregiver Education: Sent e-mail to -  I wanted to share useful tools to help manage your family member's dementia behaviors. I have included caregiver training videos from [facility] Alzheimer's and Dementia Care Program to help guide you and caretakers, as well as a daily care plan from the Alzheimer's Association. Maintaining a schedule and a structured day helps patients with dementia. I am available if you need any further assistance or have any questions. See below: 1.Agitation & Anxiety:  https://[employer] Researchu.be/hahvUXwTXE4 2. Safety 3. Alzheimer's Association Daily Care Plan:  https://www.alz.org/help-support/caregiving/daily-care/daily-care-plan 4. Educated daughter  behaviors occur to acknowledge patient's emotions and redirect behavior with distractions, address physical needs. Provided examples. Provided examples. Daughter agreed to try this non-pharmacological approach.     Look through books, picture albums, coloring books, painting, puzzles, play with ball/balloon, listen to music, stress ball, fold small items.  3. Alzheimer's Association Community Resource Finder     www.alz.org/nca/helping you/community-resource-finder    4. Caregiver support: -contact to    4. Safety:  -Pt. is accompanied 24/7 and reports safety concerns are not an issue at the moment.  Continue will fall safety precautions.   5. Caregiver stress: -.Counseled on non-pharmacological interventions for stress which include medication saritha (Calm), daily physical activity.  6. PT with [facility] 7. [facility] education folder attached PFD.     -available for urgent visits and as needed by phone. -[facility] acuity YELLOW, caregiver stress, f/u in 2 months.    MAXIM Palmer-BC

## 2024-03-11 ENCOUNTER — LABORATORY RESULT (OUTPATIENT)
Age: 89
End: 2024-03-11

## 2024-03-11 ENCOUNTER — APPOINTMENT (OUTPATIENT)
Age: 89
End: 2024-03-11
Payer: COMMERCIAL

## 2024-03-11 ENCOUNTER — APPOINTMENT (OUTPATIENT)
Age: 89
End: 2024-03-11

## 2024-03-11 PROCEDURE — D0330 PANORAMIC RADIOGRAPHIC IMAGE: CPT

## 2024-03-11 PROCEDURE — D0140: CPT

## 2024-03-11 PROCEDURE — D0220: CPT

## 2024-03-12 ENCOUNTER — LABORATORY RESULT (OUTPATIENT)
Age: 89
End: 2024-03-12

## 2024-03-12 ENCOUNTER — NON-APPOINTMENT (OUTPATIENT)
Age: 89
End: 2024-03-12

## 2024-03-15 RX ORDER — CHLORHEXIDINE GLUCONATE 4 %
5 LIQUID (ML) TOPICAL
Qty: 90 | Refills: 3 | Status: ACTIVE | COMMUNITY
Start: 2024-03-08 | End: 1900-01-01

## 2024-03-15 RX ORDER — CLOPIDOGREL BISULFATE 75 MG/1
75 TABLET, FILM COATED ORAL
Qty: 90 | Refills: 3 | Status: ACTIVE | COMMUNITY
Start: 2023-12-12 | End: 1900-01-01

## 2024-03-15 RX ORDER — PANTOPRAZOLE 40 MG/1
40 TABLET, DELAYED RELEASE ORAL DAILY
Qty: 90 | Refills: 1 | Status: ACTIVE | COMMUNITY
Start: 2023-12-12 | End: 1900-01-01

## 2024-03-15 RX ORDER — SERTRALINE 25 MG/1
25 TABLET, FILM COATED ORAL DAILY
Qty: 90 | Refills: 3 | Status: ACTIVE | COMMUNITY
Start: 2024-02-05 | End: 1900-01-01

## 2024-03-15 RX ORDER — ACETAMINOPHEN 500 MG/1
500 TABLET, COATED ORAL EVERY 8 HOURS
Qty: 180 | Refills: 3 | Status: ACTIVE | COMMUNITY
Start: 2023-03-24 | End: 1900-01-01

## 2024-03-15 RX ORDER — METOPROLOL SUCCINATE 25 MG/1
25 TABLET, EXTENDED RELEASE ORAL
Qty: 180 | Refills: 1 | Status: ACTIVE | COMMUNITY
Start: 2021-06-16 | End: 1900-01-01

## 2024-03-15 RX ORDER — POLYETHYLENE GLYCOL 3350 17 G/17G
17 POWDER, FOR SOLUTION ORAL DAILY
Qty: 1 | Refills: 1 | Status: ACTIVE | COMMUNITY
Start: 2024-03-08 | End: 1900-01-01

## 2024-03-15 RX ORDER — MIRTAZAPINE 7.5 MG/1
7.5 TABLET, FILM COATED ORAL
Qty: 90 | Refills: 1 | Status: ACTIVE | COMMUNITY
Start: 2023-04-14 | End: 1900-01-01

## 2024-03-15 RX ORDER — ASPIRIN ENTERIC COATED TABLETS 81 MG 81 MG/1
81 TABLET, DELAYED RELEASE ORAL DAILY
Qty: 90 | Refills: 3 | Status: ACTIVE | COMMUNITY
Start: 2024-03-15 | End: 1900-01-01

## 2024-03-15 RX ORDER — ASPIRIN 81 MG
81 TABLET, DELAYED RELEASE (ENTERIC COATED) ORAL DAILY
Refills: 6 | Status: DISCONTINUED | COMMUNITY
End: 2024-03-15

## 2024-03-20 ENCOUNTER — NON-APPOINTMENT (OUTPATIENT)
Age: 89
End: 2024-03-20

## 2024-03-20 ENCOUNTER — APPOINTMENT (OUTPATIENT)
Dept: GERIATRICS | Facility: CLINIC | Age: 89
End: 2024-03-20
Payer: MEDICARE

## 2024-03-20 ENCOUNTER — APPOINTMENT (OUTPATIENT)
Dept: CARDIOLOGY | Facility: CLINIC | Age: 89
End: 2024-03-20
Payer: MEDICARE

## 2024-03-20 VITALS
HEART RATE: 68 BPM | WEIGHT: 125 LBS | DIASTOLIC BLOOD PRESSURE: 59 MMHG | OXYGEN SATURATION: 99 % | RESPIRATION RATE: 15 BRPM | SYSTOLIC BLOOD PRESSURE: 121 MMHG | TEMPERATURE: 97.7 F | BODY MASS INDEX: 21.46 KG/M2

## 2024-03-20 VITALS
HEART RATE: 76 BPM | SYSTOLIC BLOOD PRESSURE: 118 MMHG | HEIGHT: 64 IN | OXYGEN SATURATION: 95 % | BODY MASS INDEX: 21.17 KG/M2 | DIASTOLIC BLOOD PRESSURE: 64 MMHG | WEIGHT: 124 LBS

## 2024-03-20 DIAGNOSIS — N18.9 CHRONIC KIDNEY DISEASE, UNSPECIFIED: ICD-10-CM

## 2024-03-20 DIAGNOSIS — H90.3 SENSORINEURAL HEARING LOSS, BILATERAL: ICD-10-CM

## 2024-03-20 DIAGNOSIS — E87.0 HYPEROSMOLALITY AND HYPERNATREMIA: ICD-10-CM

## 2024-03-20 DIAGNOSIS — Z74.1 NEED FOR ASSISTANCE WITH PERSONAL CARE: ICD-10-CM

## 2024-03-20 DIAGNOSIS — D64.9 ANEMIA, UNSPECIFIED: ICD-10-CM

## 2024-03-20 DIAGNOSIS — R53.83 OTHER FATIGUE: ICD-10-CM

## 2024-03-20 DIAGNOSIS — I25.2 OLD MYOCARDIAL INFARCTION: ICD-10-CM

## 2024-03-20 DIAGNOSIS — Z87.898 PERSONAL HISTORY OF OTHER SPECIFIED CONDITIONS: ICD-10-CM

## 2024-03-20 DIAGNOSIS — I44.7 LEFT BUNDLE-BRANCH BLOCK, UNSPECIFIED: ICD-10-CM

## 2024-03-20 DIAGNOSIS — Z92.89 PERSONAL HISTORY OF OTHER MEDICAL TREATMENT: ICD-10-CM

## 2024-03-20 DIAGNOSIS — R07.89 OTHER CHEST PAIN: ICD-10-CM

## 2024-03-20 DIAGNOSIS — Z78.9 OTHER REDUCED MOBILITY: ICD-10-CM

## 2024-03-20 DIAGNOSIS — Z74.09 OTHER REDUCED MOBILITY: ICD-10-CM

## 2024-03-20 DIAGNOSIS — R63.0 ANOREXIA: ICD-10-CM

## 2024-03-20 DIAGNOSIS — F03.90 UNSPECIFIED DEMENTIA W/OUT BEHAVIORAL DISTURBANCE: ICD-10-CM

## 2024-03-20 PROCEDURE — 99214 OFFICE O/P EST MOD 30 MIN: CPT

## 2024-03-20 PROCEDURE — 93000 ELECTROCARDIOGRAM COMPLETE: CPT

## 2024-03-20 RX ORDER — ELECTROLYTES/DEXTROSE
SOLUTION, ORAL ORAL DAILY
Qty: 90 | Refills: 3 | Status: ACTIVE | COMMUNITY
Start: 2021-11-17 | End: 1900-01-01

## 2024-03-20 RX ORDER — FUROSEMIDE 20 MG/1
20 TABLET ORAL
Qty: 30 | Refills: 3 | Status: ACTIVE | COMMUNITY
Start: 2023-05-05 | End: 1900-01-01

## 2024-03-21 PROBLEM — F03.90 DEMENTIA WITHOUT BEHAVIORAL DISTURBANCE, PSYCHOTIC DISTURBANCE, MOOD DISTURBANCE, OR ANXIETY: Status: ACTIVE | Noted: 2021-09-08

## 2024-03-21 PROBLEM — Z92.89 HISTORY OF RECENT HOSPITALIZATION: Status: ACTIVE | Noted: 2023-02-13

## 2024-03-21 PROBLEM — Z87.898 HISTORY OF MEMORY LOSS: Status: RESOLVED | Noted: 2021-09-08 | Resolved: 2024-03-21

## 2024-03-21 PROBLEM — Z74.09 IMPAIRED MOBILITY AND ADLS: Status: ACTIVE | Noted: 2021-11-01

## 2024-03-21 PROBLEM — N18.9 CKD (CHRONIC KIDNEY DISEASE): Status: ACTIVE | Noted: 2023-07-20

## 2024-03-21 PROBLEM — E87.0 HYPERNATREMIA: Status: RESOLVED | Noted: 2023-07-01 | Resolved: 2024-03-21

## 2024-03-21 PROBLEM — I44.7 LBBB (LEFT BUNDLE BRANCH BLOCK): Status: ACTIVE | Noted: 2022-05-18

## 2024-03-21 PROBLEM — R53.83 FATIGUE: Status: ACTIVE | Noted: 2023-06-30

## 2024-03-21 PROBLEM — Z74.1 REQUIRES ASSISTANCE WITH ACTIVITIES OF DAILY LIVING (ADL): Status: ACTIVE | Noted: 2021-11-01

## 2024-03-21 PROBLEM — I25.2 HISTORY OF NON-ST ELEVATION MYOCARDIAL INFARCTION (NSTEMI): Status: RESOLVED | Noted: 2023-02-13 | Resolved: 2023-03-24

## 2024-03-21 PROBLEM — R63.0 POOR APPETITE: Status: ACTIVE | Noted: 2023-06-30

## 2024-03-21 PROBLEM — R07.89 CHEST HEAVINESS: Status: RESOLVED | Noted: 2023-03-24 | Resolved: 2024-03-21

## 2024-03-21 PROBLEM — H90.3 SENSORINEURAL HEARING LOSS (SNHL) OF BOTH EARS: Status: ACTIVE | Noted: 2021-08-19

## 2024-03-21 LAB
ALBUMIN SERPL ELPH-MCNC: 4 G/DL
ALP BLD-CCNC: 56 U/L
ALT SERPL-CCNC: 37 U/L
ANION GAP SERPL CALC-SCNC: 14 MMOL/L
AST SERPL-CCNC: 51 U/L
BILIRUB SERPL-MCNC: 0.3 MG/DL
BUN SERPL-MCNC: 23 MG/DL
CALCIUM SERPL-MCNC: 9 MG/DL
CHLORIDE SERPL-SCNC: 109 MMOL/L
CO2 SERPL-SCNC: 20 MMOL/L
CREAT SERPL-MCNC: 1.73 MG/DL
EGFR: 28 ML/MIN/1.73M2
FERRITIN SERPL-MCNC: 135 NG/ML
GLUCOSE SERPL-MCNC: 95 MG/DL
HCT VFR BLD CALC: 33.2 %
HGB BLD-MCNC: 10.1 G/DL
IRON SATN MFR SERPL: 13 %
IRON SERPL-MCNC: 35 UG/DL
MCHC RBC-ENTMCNC: 27.1 PG
MCHC RBC-ENTMCNC: 30.4 GM/DL
MCV RBC AUTO: 89 FL
NT-PROBNP SERPL-MCNC: 1093 PG/ML
PLATELET # BLD AUTO: 321 K/UL
POTASSIUM SERPL-SCNC: 4.5 MMOL/L
PROT SERPL-MCNC: 6.5 G/DL
RBC # BLD: 3.73 M/UL
RBC # FLD: 16.2 %
SODIUM SERPL-SCNC: 143 MMOL/L
TIBC SERPL-MCNC: 265 UG/DL
UIBC SERPL-MCNC: 230 UG/DL
WBC # FLD AUTO: 3.34 K/UL

## 2024-03-21 NOTE — ASSESSMENT
[FreeTextEntry1] : Pt at high risk for decline, hospitalization and mortality.  Pt w/ advanced illness including moderately-severe dementia.  Priority is to avoid ED/hospitalization if possible.  Lab work ordered - f/u results Inc fluid intake today Will consider trial taper Lasix - f/u lab results check urine r/o UTI  Unable to get O2 sat today - check at next visit   Encourage/assist with PO intake  Discussed Remeron could be contributing to fatigue - will taper further Unclear if worsening depression or progression of dementia/chronic medical problems leading to depressed mood/appetite. - Consider inc Zoloft if Na OK on BW .   Fall precautions Tylenol for chronic LBP - no more than 3grams/day  f/u with cards  - c/w 24/7 supervision for safety and assistance w/ ADLs  fu with WILEY Purdy for ADC program  f/u with me after labs resulted unless earlier PRN.

## 2024-03-21 NOTE — PHYSICAL EXAM
[Normal Outer Ear/Nose] : the ears and nose were normal in appearance [Normal Appearance] : normal in appearance [Cervical Lymph Nodes Enlarged Posterior Bilaterally] : posterior cervical [Cervical Lymph Nodes Enlarged Anterior Bilaterally] : anterior cervical, supraclavicular [No Clubbing, Cyanosis] : no clubbing or cyanosis of the fingernails [Involuntary Movements] : no involuntary movements were seen [Motor Tone] : muscle strength and tone were normal [Normal] : normal skin color and pigmentation [No Focal Deficits] : no focal deficits [Normal Affect] : the affect was normal [Normal Mood] : the mood was normal [Normal Gait] : abnormal gait [Normal Hearing] : hearing was not normal [Normal Insight/Judgment] : insight and judgment were not intact [de-identified] : KAYLAH [de-identified] : confused [de-identified] : calm, cooperative  [de-identified] : slow steady cautious gait  [MocaTotal] : 11 [FreeTextEntry1] : 11/1/22 w/ NP

## 2024-03-21 NOTE — PHYSICAL EXAM
[Normal Outer Ear/Nose] : the ears and nose were normal in appearance [Involuntary Movements] : no involuntary movements were seen [No Clubbing, Cyanosis] : no clubbing or cyanosis of the fingernails [Motor Tone] : muscle strength and tone were normal [Normal] : normal skin color and pigmentation [No Focal Deficits] : no focal deficits [Normal Affect] : the affect was normal [Normal Mood] : the mood was normal [Normal Hearing] : hearing was not normal [Normal Gait] : abnormal gait [Normal Insight/Judgment] : insight and judgment were not intact [de-identified] : slow steady cautious gait  [de-identified] : KAYALH [de-identified] : confused [de-identified] : calm, cooperative, withdrawn  [MocaTotal] : 11 [FreeTextEntry1] : 11/1/22 w/ NP

## 2024-03-21 NOTE — REASON FOR VISIT
[Follow-Up] : a follow-up visit [Family Member] : family member [FreeTextEntry3] : granddaughter Justa  [FreeTextEntry1] : HFrEF, dementia [FreeTextEntry2] : who assists with history d/t pt limited historian d/t baseline memory loss

## 2024-03-21 NOTE — HISTORY OF PRESENT ILLNESS
[Patient reported hearing was abnormal] : Patient reported hearing was abnormal [Patient denied dental screening.] : Patient denied dental screening [Patient declined colon rectal/cancer screening] : Patient declined colon/rectal cancer screening [Patient declined skin cancer screening] : Patient declined skin cancer screening [Patient declined breast sonogram] : Patient declined breast sonogram [Patient declined cervical cancer screening] : Patient declined cervical cancer screening [One fall no injury in past year] : Patient reported one fall in the past year without injury [Independent] : transferring/mobility [Completely Dependent] : Completely dependent. [Full assistance needed] : Assistance needed managing medications [FAST Score: ____] : Functional Assessment Scale (FAST) Score: [unfilled] [Smoke Detector] : smoke detector [Carbon Monoxide Detector] : carbon monoxide detector [Night Light] : night light [Grab Bars] : grab bars [Wears Seat Belt] : wears seat belt [Other reason not done] : Other reason not done [Moderate] : Stage: Moderate [Worse] : Status: Worse [Memory Lapses Or Loss] : worsened memory impairment [Patient Observed To Be Agitated] : denies agitation [Hostility Toward Caregivers] : denies aggression [Sleep Disturbances] : denies sleep disturbances [] : denies wandering [Fixed Beliefs Contradicted By Reality (Delusions)] : denies delusions [Difficulty Finding Desired Words] : denies difficulty finding desired words [Designated Healthcare Proxy] : Designated healthcare proxy [DNR] : DNR [DNI] : DNI [FreeTextEntry1] : TODAY patient reports feels well at this time and denies having any complaints however poor historian d/t baseline memory loss.   Functional decline in past month per Justa.  Dec appetite, needs assistance for feeding now.  Walks with assistance only - doesn't get up to walk on her own.   BP has been trending down, 80s/50s at home. Same today.  On fluid restriction: 800cc fluids/day.   BPS: mood is down  - Takes Mirtazapine  - On Zoloft  Appetite: decreased  - Motor Syx: chronic back pain, off balance and "shaky" when walking.   Last fall in 2/21 - when getting up to go to the bathroom in the middle of the night. No injury.   - Sleep:  Sleeping more.   DEMENTIA / DEPRESSION / ANXIETY  - 11/21: Dx: Forgetfullness started approx 2016.  Sometimes forgets family member names.  Forgets her age, dates, forgets where placed glasses few minutes ago. Slow progression over past several years. Sometimes left alone at home but not for more than 1 hr.  NO longer cooks but used to.   - MOCA 11/ 30 w/ NP Gurwinder on 11/1/22  - MoCA 7/30 on 9/8/21 w/ neuro Dr. Robles  - MRI HEAD 10/26/21: b/l scattered small vessel white matter ischemic changes.   - Neuro Dr. Melita Robles - seen 9/6/23 - visit note appreciated in EMR   HFrEF / HTN / HLD / CAD s/p PCI w/ stent ~2013  - Follows w/ cards Dr. López - seen 12/19/23 - visit note appreciated in EMR  - on ASA, Plavix, statin, BB, Farxiga - On Entresto [TextBox_25] : No recent dexa. Decline to check BMD as would not change mgmt -would not wish start new med at this time as likely risk> benefit  [TextBox_31] : had hearing loss but was told would not benefit from HAs d/t might "make her more confused"  [BoneDensityDate] : 3/22  [PapSmeardate] : 6/21 [Guns at Home] : no guns at home [TextBox_37] : follows w/ optho Dr. Ca Camacho regularly  [FreeTextEntry7] : had [employer] Covid vaccine x 2, previously had flu vaccien without complications [[facility]] : >12  [Driving Concerns] : not driving or driving without noted concerns [de-identified] : PHQ2 of 2 on 11/8/22 , on antidepressant  [AdvancecareDate] : 7/31/23 [FreeTextEntry4] : HCP form on file: primary HCP is dtr Sarah, alternate is granddaughter Justa. . 6 children - Moshe Starks Eric, Kenrick, GLoria, Harvinder BUCIO on file - DNR, DNI, send to hospital when necessary, NFT, limited IVF, limited Abx, No dialysis.

## 2024-03-21 NOTE — CURRENT MEDS
[Patient/caregiver able to verbalize understanding of medications, including indications and side effects] : Patient/caregiver able to verbalize understanding of medications, including indications and side effects [Yes] : Reviewed medication list for presence of high-risk medications. [] : does not miss any medication doses

## 2024-03-21 NOTE — HISTORY OF PRESENT ILLNESS
[Preoperative Visit] : for a medical evaluation prior to surgery [Date of Surgery ___] : on [unfilled] [Poor] : Poor [Scheduled Procedure ___] : a [unfilled] [Fatigue] : fatigue [Poor Exercise Tolerance] : poor exercise tolerance [Cardiovascular Disease] : cardiovascular disease [Anti-Platelet Agents] : anti-platelet agents [Renal Disease] : renal disease [Frequent Aspirin Use] : frequent aspirin use [Prior Anesthesia] : Prior anesthesia [Electrocardiogram] : ~T an ECG ~C was performed [Echocardiogram] : ~T an echocardiogram ~C was performed [Unable to Assess] : Unable to Assess [Patient reported hearing was abnormal] : Patient reported hearing was abnormal [Patient denied dental screening.] : Patient denied dental screening [Patient declined colon rectal/cancer screening] : Patient declined colon/rectal cancer screening [Patient declined skin cancer screening] : Patient declined skin cancer screening [Patient declined breast sonogram] : Patient declined breast sonogram [Patient declined cervical cancer screening] : Patient declined cervical cancer screening [One fall no injury in past year] : Patient reported one fall in the past year without injury [Independent] : transferring/mobility [Completely Dependent] : Completely dependent. [Full assistance needed] : Assistance needed managing medications [FAST Score: ____] : Functional Assessment Scale (FAST) Score: [unfilled] [Smoke Detector] : smoke detector [Carbon Monoxide Detector] : carbon monoxide detector [Grab Bars] : grab bars [Night Light] : night light [Wears Seat Belt] : wears seat belt [Other reason not done] : Other reason not done [Moderate] : Stage: Moderate [Stable] : Status: Stable [Memory Lapses Or Loss] : stable memory impairment [Patient Observed To Be Agitated] : denies agitation [Hostility Toward Caregivers] : denies aggression [Sleep Disturbances] : denies sleep disturbances [] : denies wandering [Fixed Beliefs Contradicted By Reality (Delusions)] : denies delusions [Difficulty Finding Desired Words] : stable difficulty finding desired words [Reviewed no changes] : Reviewed, no changes [Designated Healthcare Proxy] : Designated healthcare proxy [DNR] : DNR [DNI] : DNI [Fever] : no fever [Chest Pain] : no chest pain [Chills] : no chills [Dyspnea] : no dyspnea [Cough] : no cough [Dysuria] : no dysuria [Urinary Frequency] : no urinary frequency [Nausea] : no nausea [Vomiting] : no vomiting [Diarrhea] : no diarrhea [Abdominal Pain] : no abdominal pain [Easy Bruising] : no easy bruising [Lower Extremity Swelling] : no lower extremity swelling [Diabetes] : no diabetes [Pulmonary Disease] : no pulmonary disease [Nicotine Dependence] : no nicotine dependence [Alcohol Use] : no  alcohol use [GI Disease] : no gastrointestinal disease [Sleep Apnea] : no sleep apnea [Thromboembolic Problems] : no thromboembolic problems [Frequent use of NSAIDs] : no use of NSAIDs [Transfusion Reaction] : no transfusion reaction [Impaired Immunity] : no impaired immunity [Steroid Use in Last 6 Months] : no steroid use in the last six months [Prev Anesthesia Reaction] : no previous anesthesia reaction [Anesthesia Reaction] : no anesthesia reaction [Sudden Death] : no sudden death [Clotting Disorder] : no clotting disorder [Bleeding Disorder] : no bleeding disorder [FreeTextEntry1] : TODAY patient reports feels well at this time and denies having any complaints however poor historian d/t baseline memory loss.   Hospitalized recently w/ hypernatremia, now corrected.  Meds adjusted.   Doing better since hospitalization per granddaughter - back to recent baseline.  Has f/u with cardiology later today  BPS: depression, anxiety, withdrawn  SLEEP: deny problems  Appetite: decreased - Takes Mirtazapine  - On Zoloft  - Motor Syx: chronic back pain, gait imbalance  Last fall in 2/21 - when getting up to go to the bathroom in the middle of the night. No injury.    DEMENTIA - 11/21: Dx: Forgetfulness started approx 2016.  Sometimes forgets family member names.  Forgets her age, dates, forgets where placed glasses few minutes ago. Slow progression over past several years. Sometimes left alone at home but not for more than 1 hr.  NO longer cooks but used to.   - MOCA 11/ 30 w/ NP Gurwinder on 11/1/22  - MoCA 7/30 on 9/8/21 w/ neuro Dr. Robles  - MRI HEAD 10/26/21: b/l scattered small vessel white matter ischemic changes.   - Neuro Dr. Melita Robles   HFrEF / HTN / HLD / CAD s/p PCI w/ stent ~2013  - Follows w/ cards Dr. López  - on ASA, Plavix, statin, BB, Farxiga - On Entresto [TextBox_25] : No recent dexa. Decline to check BMD as would not change mgmt -would not wish start new med at this time as likely risk> benefit  [PapSmeardate] : 6/21 [TextBox_31] : had hearing loss but was told would not benefit from HAs d/t might "make her more confused"  [TextBox_37] : follows w/ optho Dr. Ca Camacho regularly  [BoneDensityDate] : 3/22  [FreeTextEntry7] : had [employer] Covid vaccine x 2, previously had flu vaccien without complications [Guns at Home] : no guns at home [Driving Concerns] : not driving or driving without noted concerns [FreeTextEntry3] : unsteady gait, needs supervision  [facility] : >12  [de-identified] : on antidepressant  [AdvancecareDate] : 3/20/24 [FreeTextEntry4] : HCP form on file: primary HCP is dtr Sarah, alternate is granddaughter Justa. .  MOLST on file - DNR, DNI, send to hospital when necessary, NFT, limited IVF, limited Abx, No dialysis.

## 2024-03-21 NOTE — REVIEW OF SYSTEMS
[As Noted in HPI] : as noted in HPI [Easy Bleeding] : a tendency for easy bleeding [Easy Bruising] : a tendency for easy bruising [Negative] : Endocrine

## 2024-03-21 NOTE — ASSESSMENT
[Procedure Low Risk] : the procedure risk is low [Patient Intermediate Risk] : the patient is an intermediate risk [As per surgery] : as per surgery [Optimized for Surgery] : the patient is optimized for surgery [Continue] : Continue medications as currently directed [FreeTextEntry1] : Recent hospitalization for hypernatremia - now back to baseline.  Off fluid restriction.  VS wnl today.  - c/w Toprol 25mg daily  - c/w Entresto 24-26 mg BID - c/w Farxiga 10mg daily - c/w Lasix 20mg QOD - c/w ASA, Plavix as per cards  f/u with cards as planned later today - f/u eval/recs.   Patient is medically optimized for tooth extraction under local anesthesia.   Repeat labs ordered.   On Zoloft 25mg daily and Remeron 7.5mg QHS - c/w same for now.  Consider trial taper off Zoloft in future but c/w same for now.   Fall precautions Tylenol for chronic LBP - no more than 3grams/day  - c/w 24/7 supervision for safety and assistance w/ ADLs  fu with WILEY Purdy for ADC program regularly adv   f/u with me in 3 mo, unless earlier PRN.

## 2024-03-22 ENCOUNTER — APPOINTMENT (OUTPATIENT)
Age: 89
End: 2024-03-22
Payer: COMMERCIAL

## 2024-03-22 PROCEDURE — D7210: CPT

## 2024-03-22 NOTE — REASON FOR VISIT
[Arrhythmia/ECG Abnorrmalities] : arrhythmia/ECG abnormalities [Coronary Artery Disease] : coronary artery disease [Other: _____] : [unfilled] [FreeTextEntry1] : 3/20/2024 Gloria Maldonado returns today for follow up after a recent hospitalization. Following abnormal labs in the outpatient setting, she was advised to seek emergency attention for hypernatremia with a sodium level of 166. During her stay sodium levels were corrected slowly and her mental status improved. She was discharged on furosemide 20 mg every other day.  At the time of admission her creatinine was 2.92 mg/dL. It improved to baseline of 1.65 prior to discharge. She also had slight elevation of her liver enzymes. As she returns today she is back to her usual state of health and she has no complaints. She is now pending a tooth extraction on Friday 3/22/2024.

## 2024-03-22 NOTE — CARDIOLOGY SUMMARY
[de-identified] : 3/14/2022, sinus rhythm, 68 bpm, LBBB 5/18/2022, sinus rhythm, 69 bpm, LBBB 12/18/2023, sinus rhythm at 75 bpm, PVC x1, LBBB [de-identified] : 1/9/2023 in [place] - moderate MR, moderate-severe TR, pulmonary hypertension, septal and lateral wall motion abnormality, LVEF 35% 3/3/2023, dilated LA, severe pulmonary hypertension, PASP 64 mmHg, severe global LV dysfunction, LVEF 15-20% 12/12/2023, normal LA, moderate pulmonary pressures, PASP 48 mmHg, severe global LV dysfunction,, LVEF 25%

## 2024-03-22 NOTE — END OF VISIT
[Time Spent: ___ minutes] : I have spent [unfilled] minutes of time on the encounter. [FreeTextEntry3] : I saw the patient with Suze Stephens NP and I agree with the clinical findings, exam and assessment and plan as above.

## 2024-03-22 NOTE — HISTORY OF PRESENT ILLNESS
[FreeTextEntry1] : Patient is an 88 year-old Black woman who has recently moved and is changing doctors. She has a known past medical history of hypertension, dyslipidemia, coronary artery disease status post PCI (2013), with known dementia, who was recently noted to have a LBBB on her ECG. She has no new cardiovascular complaints.  She continues walking and dancing for exercise.   February 2023 - Patient was in [place] in December and January. While she was there, she had chest pain and went to the hospital where she was told she had a heart attack. She was given clopidogrel 75 mg and sildenafil 50 mg as new prescriptions, but they have not been continued. She is currently maintained on sertraline 25 mg daily, ASA 81 mg daily, atorvastatin 20 mg daily, and metoprolol succinate 25 mg daily.  April 2023 - Patient returns today for follow-up after recent hospitalization for chest discomfort. She was seen here by Dr. Egan on 3/24/2023 and started on Entresto 24-26 mg BID. One week later, she was admitted to [facility] with chest pain that improved with diuresis. There was no evidence of acute coronary syndrome. She has felt well since discharge. Of note, she was discharged off her Entresto because of relative hypotension.   July 2023 - Patient returns today for follow-up. Her weight has been up by approximately 2-3 lbs this week. She was given more diuretics, but then labs suggested she was dehydrated and she reduced her dosing for the rest of the week. Repeat labs showed interval improvement in renal function, but creatinine remained above her baseline. She reported easy fatigability, but no shortness of breath or notable edema.   July 20, 2023 - She returns today for follow-up two weeks after her last visit. She has continued to lose weight. She is now 122 lbs on her home scale. She has been taking Ensure nutritional supplements.   December 2023- TeleHealth Video Encounter Initiated by: Patient election for TeleHealth visit Patient was consented for TeleHealth visit Patient Location: Home with her granddaughterJusta Physician Location: Office ([address], [address], [place], [place], [address]) Duration of Encounter: 30 minutes, at least 50% of which was spent in direct counseling and coordination of care.   She was hospitalized from 12/11 - 12/12/2023 because of chest pain and dyspnea on exertion.   February 2024 - Patient returns today for follow-up in her usual state of health. She is a poor  of her symptoms or condition. Her daughter and granddaughter report that her activity level has decreased. She has less energy. But she does not complain of anything except constipation. Constipation that has been relieved by prune juice. She sleeps well at night.  Statement Selected

## 2024-03-22 NOTE — DISCUSSION/SUMMARY
[EKG obtained to assist in diagnosis and management of assessed problem(s)] : EKG obtained to assist in diagnosis and management of assessed problem(s) [FreeTextEntry1] : Patient is an 88 year-old Black woman with multiple cardiovascular risk factors and known coronary artery disease, presents for evaluation of a LBBB seen on ECG. She has no acute cardiac complaints. She has dementia that is mild, but progressive.   Continue ASA and statin therapy for patient with known coronary artery disease status post PCI. In advance of planned tooth extraction, she may hold clopidogrel for 2 days prior to the procedure.   For patient with severely reduced LVEF, continue beta-blocker and SGLT-2 inhibitor.  Recommend continuing low dose Entresto. Referred to EP for consideration of CRT-P for patient with LBBB and severely reduced LVEF. Patient and her family have chosen to defer elective PPM implant at this time.  Labs today to monitor renal function, LFTs and electrolytes.   Follow up per the usual schedule.

## 2024-05-09 ENCOUNTER — RX RENEWAL (OUTPATIENT)
Age: 89
End: 2024-05-09

## 2024-05-09 RX ORDER — SACUBITRIL AND VALSARTAN 24; 26 MG/1; MG/1
24-26 TABLET, FILM COATED ORAL TWICE DAILY
Qty: 60 | Refills: 10 | Status: ACTIVE | COMMUNITY
Start: 2023-05-23 | End: 1900-01-01

## 2024-05-10 ENCOUNTER — APPOINTMENT (OUTPATIENT)
Dept: GERIATRICS | Facility: CLINIC | Age: 89
End: 2024-05-10

## 2024-05-29 ENCOUNTER — APPOINTMENT (OUTPATIENT)
Dept: CARDIOLOGY | Facility: CLINIC | Age: 89
End: 2024-05-29

## 2024-06-02 ENCOUNTER — RX RENEWAL (OUTPATIENT)
Age: 89
End: 2024-06-02

## 2024-06-02 RX ORDER — DAPAGLIFLOZIN 10 MG/1
10 TABLET, FILM COATED ORAL
Qty: 30 | Refills: 10 | Status: ACTIVE | COMMUNITY
Start: 2023-04-17 | End: 1900-01-01

## 2024-06-03 ENCOUNTER — NON-APPOINTMENT (OUTPATIENT)
Age: 89
End: 2024-06-03

## 2024-06-03 ENCOUNTER — APPOINTMENT (OUTPATIENT)
Dept: CARDIOLOGY | Facility: CLINIC | Age: 89
End: 2024-06-03
Payer: MEDICARE

## 2024-06-03 VITALS
DIASTOLIC BLOOD PRESSURE: 70 MMHG | OXYGEN SATURATION: 98 % | WEIGHT: 122 LBS | BODY MASS INDEX: 20.83 KG/M2 | HEART RATE: 71 BPM | SYSTOLIC BLOOD PRESSURE: 140 MMHG | HEIGHT: 64 IN

## 2024-06-03 DIAGNOSIS — I25.10 ATHEROSCLEROTIC HEART DISEASE OF NATIVE CORONARY ARTERY W/OUT ANGINA PECTORIS: ICD-10-CM

## 2024-06-03 DIAGNOSIS — I50.20 UNSPECIFIED SYSTOLIC (CONGESTIVE) HEART FAILURE: ICD-10-CM

## 2024-06-03 DIAGNOSIS — I10 ESSENTIAL (PRIMARY) HYPERTENSION: ICD-10-CM

## 2024-06-03 PROCEDURE — 93000 ELECTROCARDIOGRAM COMPLETE: CPT

## 2024-06-03 PROCEDURE — G2211 COMPLEX E/M VISIT ADD ON: CPT

## 2024-06-03 PROCEDURE — 99214 OFFICE O/P EST MOD 30 MIN: CPT

## 2024-06-09 NOTE — DISCUSSION/SUMMARY
[EKG obtained to assist in diagnosis and management of assessed problem(s)] : EKG obtained to assist in diagnosis and management of assessed problem(s) [FreeTextEntry1] : Patient is an 88 year-old Black woman with multiple cardiovascular risk factors and known coronary artery disease, presents for evaluation of a LBBB seen on ECG. She has no acute cardiac complaints. She has dementia that is mild, but progressive.   Continue ASA and statin therapy for patient with known coronary artery disease status post PCI.  For patient with severely reduced LVEF, continue beta-blocker and SGLT-2 inhibitor.  Recommend continuing low dose Entresto. Referred to EP for consideration of CRT-P for patient with LBBB and severely reduced LVEF. Patient and her family have chosen to defer elective PPM implant at this time.  Labs today to monitor renal function, LFTs and electrolytes.   Follow up per the usual schedule.

## 2024-06-09 NOTE — HISTORY OF PRESENT ILLNESS
[FreeTextEntry1] : Patient is an 88 year-old Black woman who has recently moved and is changing doctors. She has a known past medical history of hypertension, dyslipidemia, coronary artery disease status post PCI (2013), with known dementia, who was recently noted to have a LBBB on her ECG. She has no new cardiovascular complaints.  She continues walking and dancing for exercise.   February 2023 - Patient was in [place] in December and January. While she was there, she had chest pain and went to the hospital where she was told she had a heart attack. She was given clopidogrel 75 mg and sildenafil 50 mg as new prescriptions, but they have not been continued. She is currently maintained on sertraline 25 mg daily, ASA 81 mg daily, atorvastatin 20 mg daily, and metoprolol succinate 25 mg daily.  April 2023 - Patient returns today for follow-up after recent hospitalization for chest discomfort. She was seen here by Dr. Egan on 3/24/2023 and started on Entresto 24-26 mg BID. One week later, she was admitted to [facility] with chest pain that improved with diuresis. There was no evidence of acute coronary syndrome. She has felt well since discharge. Of note, she was discharged off her Entresto because of relative hypotension.   July 2023 - Patient returns today for follow-up. Her weight has been up by approximately 2-3 lbs this week. She was given more diuretics, but then labs suggested she was dehydrated and she reduced her dosing for the rest of the week. Repeat labs showed interval improvement in renal function, but creatinine remained above her baseline. She reported easy fatigability, but no shortness of breath or notable edema.   July 20, 2023 - She returns today for follow-up two weeks after her last visit. She has continued to lose weight. She is now 122 lbs on her home scale. She has been taking Ensure nutritional supplements.   December 2023- TeleHealth Video Encounter Initiated by: Patient election for TeleHealth visit Patient was consented for TeleHealth visit Patient Location: Home with her granddaughterJusta Physician Location: Office ([address], [address], [place], [place], [address]) Duration of Encounter: 30 minutes, at least 50% of which was spent in direct counseling and coordination of care.   She was hospitalized from 12/11 - 12/12/2023 because of chest pain and dyspnea on exertion.   February 2024 - Patient returns today for follow-up in her usual state of health. She is a poor  of her symptoms or condition. Her daughter and granddaughter report that her activity level has decreased. She has less energy. But she does not complain of anything except constipation. Constipation that has been relieved by prune juice. She sleeps well at night.   3/20/2024 Gloria Maldonado returns today for follow up after a recent hospitalization. Following abnormal labs in the outpatient setting, she was advised to seek emergency attention for hypernatremia with a sodium level of 166. During her stay sodium levels were corrected slowly and her mental status improved. She was discharged on furosemide 20 mg every other day.  At the time of admission her creatinine was 2.92 mg/dL. It improved to baseline of 1.65 prior to discharge. She also had slight elevation of her liver enzymes. As she returns today she is back to her usual state of health and she has no complaints. She is now pending a tooth extraction on Friday 3/22/2024.

## 2024-06-09 NOTE — END OF VISIT
[FreeTextEntry3] : I, Anand López MD, personally performed the evaluation and management (E/M) services for this established patient who presents today with (a) new problem(s)/exacerbation of (an) existing condition(s). That E/M includes conducting the clinically appropriate interval history &/or exam, assessing all new/exacerbated conditions, and establishing a new plan of care. Today, Suze Stephens NP was here to observe my evaluation and management service for this new problem/exacerbated condition and follow the plan of care established by me going forward.  [Time Spent: ___ minutes] : I have spent [unfilled] minutes of time on the encounter.

## 2024-06-09 NOTE — REASON FOR VISIT
[Arrhythmia/ECG Abnorrmalities] : arrhythmia/ECG abnormalities [Coronary Artery Disease] : coronary artery disease [Other: _____] : [unfilled] [FreeTextEntry1] : 6/3/2024 Gloria returns today for scheduled follow up. She is seen today in her usual state of health. She denies any chest discomfort, shortness of breath, palpitations, lightheadedness or syncope. She continues to take her medications as directed.

## 2024-06-09 NOTE — CARDIOLOGY SUMMARY
[de-identified] : 3/14/2022, sinus rhythm, 68 bpm, LBBB 5/18/2022, sinus rhythm, 69 bpm, LBBB 12/18/2023, sinus rhythm at 75 bpm, PVC x1, LBBB 6/3/2024, sinus rhythm at 71 bpm, LBBB [de-identified] : 1/9/2023 in [place] - moderate MR, moderate-severe TR, pulmonary hypertension, septal and lateral wall motion abnormality, LVEF 35% 3/3/2023, dilated LA, severe pulmonary hypertension, PASP 64 mmHg, severe global LV dysfunction, LVEF 15-20% 12/12/2023, normal LA, moderate pulmonary pressures, PASP 48 mmHg, severe global LV dysfunction,, LVEF 25%

## 2024-06-10 ENCOUNTER — RX RENEWAL (OUTPATIENT)
Age: 89
End: 2024-06-10

## 2024-06-10 RX ORDER — ATORVASTATIN CALCIUM 20 MG/1
20 TABLET, FILM COATED ORAL
Qty: 90 | Refills: 3 | Status: ACTIVE | COMMUNITY
Start: 2022-10-17 | End: 1900-01-01

## 2024-06-17 ENCOUNTER — APPOINTMENT (OUTPATIENT)
Dept: GERIATRICS | Facility: CLINIC | Age: 89
End: 2024-06-17

## 2024-06-21 ENCOUNTER — APPOINTMENT (OUTPATIENT)
Dept: GERIATRICS | Facility: CLINIC | Age: 89
End: 2024-06-21

## 2024-08-07 ENCOUNTER — NON-APPOINTMENT (OUTPATIENT)
Age: 89
End: 2024-08-07

## 2024-08-09 ENCOUNTER — APPOINTMENT (OUTPATIENT)
Dept: GERIATRICS | Facility: CLINIC | Age: 89
End: 2024-08-09

## 2024-08-09 PROBLEM — Z23 NEED FOR PNEUMOCOCCAL VACCINATION: Status: ACTIVE | Noted: 2024-08-09

## 2024-08-09 PROCEDURE — G0009: CPT | Mod: 59

## 2024-08-09 PROCEDURE — 99214 OFFICE O/P EST MOD 30 MIN: CPT

## 2024-08-09 PROCEDURE — 90677 PCV20 VACCINE IM: CPT | Mod: 59

## 2024-08-09 PROCEDURE — G2211 COMPLEX E/M VISIT ADD ON: CPT

## 2024-08-09 NOTE — ASSESSMENT
[Procedure Low Risk] : the procedure risk is low [Patient Intermediate Risk] : the patient is an intermediate risk [Optimized for Surgery] : the patient is optimized for surgery [As per surgery] : as per surgery [Continue] : Continue medications as currently directed [FreeTextEntry1] : - discussed r/b/a of Prevnar and wishes to get today  Will complete CDPAP forms.  - Advised Lasix 20 mg daily until weight back to baseline over 124 pounds, then can resume 20 mg every other day.  - c/w Toprol 25mg daily  - c/w Entresto 24-26 mg BID - c/w Farxiga 10mg daily - c/w ASA, Plavix as per cards  f/u with cards regularly   Repeat labs ordered.   On Zoloft 25mg daily and Remeron 7.5mg QHS - c/w same for now.  Consider trial taper off Zoloft in future but c/w same for now d/t mood has been down recently. Reeval when daughter comes back from travel.   Fall precautions Tylenol for chronic LBP - no more than 3grams/day  - c/w 24/7 supervision for safety and assistance w/ ADLs  fu with [facility] Purdy for ADC program.  Not eligible for GUIDE.   f/u with me in 3 mo, unless earlier PRN.

## 2024-08-09 NOTE — HISTORY OF PRESENT ILLNESS
[Preoperative Visit] : for a medical evaluation prior to surgery [Scheduled Procedure ___] : a [unfilled] [Date of Surgery ___] : on [unfilled] [Poor] : Poor [Fever] : no fever [Chills] : no chills [Fatigue] : fatigue [Chest Pain] : no chest pain [Cough] : no cough [Dyspnea] : no dyspnea [Dysuria] : no dysuria [Urinary Frequency] : no urinary frequency [Nausea] : no nausea [Vomiting] : no vomiting [Diarrhea] : no diarrhea [Abdominal Pain] : no abdominal pain [Easy Bruising] : no easy bruising [Lower Extremity Swelling] : no lower extremity swelling [Poor Exercise Tolerance] : poor exercise tolerance [Diabetes] : no diabetes [Cardiovascular Disease] : cardiovascular disease [Pulmonary Disease] : no pulmonary disease [Anti-Platelet Agents] : anti-platelet agents [Nicotine Dependence] : no nicotine dependence [Alcohol Use] : no  alcohol use [Renal Disease] : renal disease [GI Disease] : no gastrointestinal disease [Sleep Apnea] : no sleep apnea [Thromboembolic Problems] : no thromboembolic problems [Frequent use of NSAIDs] : no use of NSAIDs [Transfusion Reaction] : no transfusion reaction [Impaired Immunity] : no impaired immunity [Steroid Use in Last 6 Months] : no steroid use in the last six months [Frequent Aspirin Use] : frequent aspirin use [Prior Anesthesia] : Prior anesthesia [Prev Anesthesia Reaction] : no previous anesthesia reaction [Electrocardiogram] : ~T an ECG ~C was performed [Echocardiogram] : ~T an echocardiogram ~C was performed [Unable to Assess] : Unable to Assess [Anesthesia Reaction] : no anesthesia reaction [Sudden Death] : no sudden death [Clotting Disorder] : no clotting disorder [Bleeding Disorder] : no bleeding disorder [Patient reported hearing was abnormal] : Patient reported hearing was abnormal [Patient denied dental screening.] : Patient denied dental screening [Patient declined colon rectal/cancer screening] : Patient declined colon/rectal cancer screening [Patient declined skin cancer screening] : Patient declined skin cancer screening [Patient declined breast sonogram] : Patient declined breast sonogram [Patient declined cervical cancer screening] : Patient declined cervical cancer screening [One fall no injury in past year] : Patient reported one fall in the past year without injury [Independent] : transferring/mobility [Completely Dependent] : Completely dependent. [Full assistance needed] : Assistance needed managing medications [FAST Score: ____] : Functional Assessment Scale (FAST) Score: [unfilled] [Smoke Detector] : smoke detector [Carbon Monoxide Detector] : carbon monoxide detector [Grab Bars] : grab bars [Night Light] : night light [Wears Seat Belt] : wears seat belt [Other reason not done] : Other reason not done [Moderate] : Stage: Moderate [Stable] : Status: Stable [Memory Lapses Or Loss] : stable memory impairment [Patient Observed To Be Agitated] : denies agitation [Hostility Toward Caregivers] : denies aggression [Sleep Disturbances] : denies sleep disturbances [] : denies wandering [Fixed Beliefs Contradicted By Reality (Delusions)] : denies delusions [Difficulty Finding Desired Words] : stable difficulty finding desired words [Reviewed no changes] : Reviewed, no changes [Designated Healthcare Proxy] : Designated healthcare proxy [DNR] : DNR [DNI] : DNI [FreeTextEntry1] : TODAY patient reports feels well at this time and denies having any complaints however poor historian d/t baseline memory loss.   Dental extraction went well.   Gained 2.5 pounds recently.  Baseline weight 124lbs per granddaughter.  Took extra dose of Lasix with improvement in weight almost back to baseline.  Lower extremity swelling also resolved.  Denies having any other complaints today.  Request CDPAP forms completion.   Mood has been a little down recently, granddaughter thinks it is because her  as a way daughter traveling to their country wishes she could be there with them.  BPS: depression, anxiety, withdrawn  SLEEP: deny problems  Appetite: decreased - Takes Mirtazapine  - On Zoloft  - Motor Syx: chronic back pain, gait imbalance  Last fall in 2/21 - when getting up to go to the bathroom in the middle of the night. No injury.    DEMENTIA - 11/21: Dx: Forgetfulness started approx 2016.  Sometimes forgets family member names.  Forgets her age, dates, forgets where placed glasses few minutes ago. Slow progression over past several years. Sometimes left alone at home but not for more than 1 hr.  NO longer cooks but used to.  - MOCA 11/ 30 w/ NP Gurwinder on 11/1/22  - MoCA 7/30 on 9/8/21 w/ neuro Dr. Robles  - MRI HEAD 10/26/21: b/l scattered small vessel white matter ischemic changes.  - Neuro Dr. Melita Robles   HFrEF / HTN / HLD / CAD s/p PCI w/ stent ~2013  - Follows w/ cards Dr. López  - on ASA, Plavix, statin, BB, Farxiga - On Entresto [TextBox_25] : No recent dexa. Decline to check BMD as would not change mgmt -would not wish start new med at this time as likely risk> benefit  [PapSmeardate] : 6/21 [TextBox_31] : had hearing loss but was told would not benefit from HAs d/t might "make her more confused"  [BoneDensityDate] : 3/22  [TextBox_37] : follows w/ optho Dr. Ca Camacho regularly  [FreeTextEntry7] : had [employer] Covid vaccine x 2, previously had flu vaccien without complications [Driving Concerns] : not driving or driving without noted concerns [[facility]  [FreeTextEntry3] : unsteady gait, needs supervision  [de-identified] : on antidepressant  [AdvancecareDate] : 3/20/24 [FreeTextEntry4] : HCP form on file: primary HCP is dtr Sarah, alternate is granddaughter Justa. .  MOLST on file - DNR, DNI, send to hospital when necessary, NFT, limited IVF, limited Abx, No dialysis.

## 2024-08-09 NOTE — REASON FOR VISIT
[Follow-Up] : a follow-up visit [Family Member] : family member [FreeTextEntry1] : HFrEF, dementia [FreeTextEntry3] : granddaughter Justa  [FreeTextEntry2] : who assists with history d/t pt limited historian d/t baseline memory loss

## 2024-08-09 NOTE — PHYSICAL EXAM
[Normal Outer Ear/Nose] : the ears and nose were normal in appearance [No Clubbing, Cyanosis] : no clubbing or cyanosis of the fingernails [Involuntary Movements] : no involuntary movements were seen [Motor Tone] : muscle strength and tone were normal [Normal] : normal skin color and pigmentation [No Focal Deficits] : no focal deficits [Normal Affect] : the affect was normal [Normal Mood] : the mood was normal [Normal Hearing] : hearing was not normal [Normal Gait] : abnormal gait [Normal Insight/Judgment] : insight and judgment were not intact [de-identified] : KAYLAH [de-identified] : slow steady cautious gait  [de-identified] : confused [de-identified] : calm, cooperative, withdrawn  [MocaTotal] : 11 [FreeTextEntry1] : 11/1/22 w/ NP

## 2024-09-04 NOTE — DISCHARGE NOTE PROVIDER - NSDCHC_MEDRECSTATUS_GEN_ALL_CORE
Admission Reconciliation is Completed  Discharge Reconciliation is Not Complete For information on Fall & Injury Prevention, visit: [email or web address] OR  [email or web address] OR  https://www.cdc.gov/steadi/patient.html Admission Reconciliation is Completed  Discharge Reconciliation is Completed

## 2024-09-10 NOTE — PROGRESS NOTE ADULT - PROBLEM SELECTOR PLAN 3
Admission creatinine at 2.92, patient's baseline is 1.5-1.6. ISO of hypernatremia most likely pre-renal due to reduced PO intake.   *3/4: Creatinine improved to 1.68.    Plan  - Monitor improvement of creatinine with fluid resuscitation.  - Avoid nephrotoxic medications  - reassess diuretic use/regimen  - entresto on hold for now Admission creatinine at 2.92, patient's baseline is 1.5-1.6. ISO of hypernatremia most likely pre-renal due to reduced PO intake.   *3/5: Creatinine improved to 1.65    Plan  - Monitor improvement of creatinine with fluid resuscitation.  - Avoid nephrotoxic medications  - reassess diuretic use/regimen  - entresto on hold for now Admission creatinine at 2.92, patient's baseline is 1.5-1.6. ISO of hypernatremia most likely pre-renal due to reduced PO intake.   *3/5: Creatinine improved to 1.65    Plan  - Monitor improvement of creatinine with fluid resuscitation.  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 Patient has lymph node biopsy performed and Patient had castle testing done. The melanoma on the eye was treated with plastic surgeon Dr. Barajas in [place], [place]. \\n\\Leif Jensen counsels patient to follow up with the cancer center where the melanoma on the eye was removed, to ensure follow up care is received. Render Risk Assessment In Note?: no

## 2024-10-07 ENCOUNTER — APPOINTMENT (OUTPATIENT)
Dept: CARDIOLOGY | Facility: CLINIC | Age: 89
End: 2024-10-07

## 2024-10-21 ENCOUNTER — APPOINTMENT (OUTPATIENT)
Dept: GERIATRICS | Facility: CLINIC | Age: 89
End: 2024-10-21

## 2024-12-03 ENCOUNTER — APPOINTMENT (OUTPATIENT)
Dept: GERIATRICS | Facility: CLINIC | Age: 88
End: 2024-12-03

## 2024-12-03 VITALS
HEART RATE: 65 BPM | OXYGEN SATURATION: 98 % | WEIGHT: 128 LBS | BODY MASS INDEX: 21.97 KG/M2 | TEMPERATURE: 98.1 F | SYSTOLIC BLOOD PRESSURE: 160 MMHG | RESPIRATION RATE: 15 BRPM | DIASTOLIC BLOOD PRESSURE: 79 MMHG

## 2024-12-03 DIAGNOSIS — R63.4 ABNORMAL WEIGHT LOSS: ICD-10-CM

## 2024-12-03 DIAGNOSIS — Z74.09 OTHER REDUCED MOBILITY: ICD-10-CM

## 2024-12-03 DIAGNOSIS — N18.9 CHRONIC KIDNEY DISEASE, UNSPECIFIED: ICD-10-CM

## 2024-12-03 DIAGNOSIS — F03.90 UNSPECIFIED DEMENTIA W/OUT BEHAVIORAL DISTURBANCE: ICD-10-CM

## 2024-12-03 DIAGNOSIS — E78.5 HYPERLIPIDEMIA, UNSPECIFIED: ICD-10-CM

## 2024-12-03 DIAGNOSIS — F41.8 OTHER SPECIFIED ANXIETY DISORDERS: ICD-10-CM

## 2024-12-03 DIAGNOSIS — I50.20 UNSPECIFIED SYSTOLIC (CONGESTIVE) HEART FAILURE: ICD-10-CM

## 2024-12-03 DIAGNOSIS — I44.7 LEFT BUNDLE-BRANCH BLOCK, UNSPECIFIED: ICD-10-CM

## 2024-12-03 DIAGNOSIS — Z78.9 OTHER REDUCED MOBILITY: ICD-10-CM

## 2024-12-03 DIAGNOSIS — Z00.00 ENCOUNTER FOR GENERAL ADULT MEDICAL EXAMINATION W/OUT ABNORMAL FINDINGS: ICD-10-CM

## 2024-12-03 DIAGNOSIS — Z23 ENCOUNTER FOR IMMUNIZATION: ICD-10-CM

## 2024-12-03 DIAGNOSIS — Z71.89 OTHER SPECIFIED COUNSELING: ICD-10-CM

## 2024-12-03 DIAGNOSIS — D64.9 ANEMIA, UNSPECIFIED: ICD-10-CM

## 2024-12-03 PROCEDURE — G0439: CPT

## 2024-12-03 PROCEDURE — 90662 IIV NO PRSV INCREASED AG IM: CPT

## 2024-12-03 PROCEDURE — G0008: CPT

## 2024-12-03 PROCEDURE — 99213 OFFICE O/P EST LOW 20 MIN: CPT | Mod: 25

## 2024-12-05 DIAGNOSIS — E87.0 HYPEROSMOLALITY AND HYPERNATREMIA: ICD-10-CM

## 2024-12-05 LAB
ALBUMIN SERPL ELPH-MCNC: 4.4 G/DL
ALP BLD-CCNC: 72 U/L
ALT SERPL-CCNC: 58 U/L
ANION GAP SERPL CALC-SCNC: 15 MMOL/L
AST SERPL-CCNC: 46 U/L
BASOPHILS # BLD AUTO: 0.04 K/UL
BASOPHILS NFR BLD AUTO: 0.8 %
BILIRUB SERPL-MCNC: 0.4 MG/DL
BUN SERPL-MCNC: 38 MG/DL
CALCIUM SERPL-MCNC: 9.8 MG/DL
CHLORIDE SERPL-SCNC: 111 MMOL/L
CHOLEST SERPL-MCNC: 186 MG/DL
CO2 SERPL-SCNC: 26 MMOL/L
CREAT SERPL-MCNC: 1.75 MG/DL
EGFR: 28 ML/MIN/1.73M2
EOSINOPHIL # BLD AUTO: 0.03 K/UL
EOSINOPHIL NFR BLD AUTO: 0.6 %
GLUCOSE SERPL-MCNC: 75 MG/DL
HCT VFR BLD CALC: 42.6 %
HDLC SERPL-MCNC: 60 MG/DL
HGB BLD-MCNC: 12.9 G/DL
IMM GRANULOCYTES NFR BLD AUTO: 0.2 %
LDLC SERPL CALC-MCNC: 107 MG/DL
LYMPHOCYTES # BLD AUTO: 1.47 K/UL
LYMPHOCYTES NFR BLD AUTO: 29.9 %
MAN DIFF?: NORMAL
MCHC RBC-ENTMCNC: 26.6 PG
MCHC RBC-ENTMCNC: 30.3 G/DL
MCV RBC AUTO: 87.8 FL
MONOCYTES # BLD AUTO: 0.42 K/UL
MONOCYTES NFR BLD AUTO: 8.5 %
NEUTROPHILS # BLD AUTO: 2.95 K/UL
NEUTROPHILS NFR BLD AUTO: 60 %
NONHDLC SERPL-MCNC: 125 MG/DL
NT-PROBNP SERPL-MCNC: 496 PG/ML
PLATELET # BLD AUTO: 246 K/UL
POTASSIUM SERPL-SCNC: 4.3 MMOL/L
PROT SERPL-MCNC: 7.4 G/DL
RBC # BLD: 4.85 M/UL
RBC # FLD: 13.2 %
SODIUM SERPL-SCNC: 152 MMOL/L
TRIGL SERPL-MCNC: 104 MG/DL
WBC # FLD AUTO: 4.92 K/UL

## 2024-12-11 LAB
ANION GAP SERPL CALC-SCNC: 13 MMOL/L
BUN SERPL-MCNC: 31 MG/DL
CALCIUM SERPL-MCNC: 9.7 MG/DL
CHLORIDE SERPL-SCNC: 110 MMOL/L
CO2 SERPL-SCNC: 26 MMOL/L
CREAT SERPL-MCNC: 1.74 MG/DL
EGFR: 28 ML/MIN/1.73M2
GLUCOSE SERPL-MCNC: 113 MG/DL
POTASSIUM SERPL-SCNC: 4.7 MMOL/L
SODIUM SERPL-SCNC: 149 MMOL/L

## 2024-12-19 DIAGNOSIS — I50.20 UNSPECIFIED SYSTOLIC (CONGESTIVE) HEART FAILURE: ICD-10-CM

## 2024-12-19 DIAGNOSIS — E87.0 HYPEROSMOLALITY AND HYPERNATREMIA: ICD-10-CM

## 2024-12-19 LAB
ANION GAP SERPL CALC-SCNC: 13 MMOL/L
BUN SERPL-MCNC: 28 MG/DL
CALCIUM SERPL-MCNC: 9.6 MG/DL
CHLORIDE SERPL-SCNC: 108 MMOL/L
CO2 SERPL-SCNC: 26 MMOL/L
CREAT SERPL-MCNC: 1.56 MG/DL
EGFR: 32 ML/MIN/1.73M2
GLUCOSE SERPL-MCNC: 88 MG/DL
NT-PROBNP SERPL-MCNC: 783 PG/ML
POTASSIUM SERPL-SCNC: 4.5 MMOL/L
SODIUM SERPL-SCNC: 147 MMOL/L

## 2024-12-27 ENCOUNTER — LABORATORY RESULT (OUTPATIENT)
Age: 88
End: 2024-12-27

## 2024-12-31 LAB
ANION GAP SERPL CALC-SCNC: 17 MMOL/L
BUN SERPL-MCNC: 31 MG/DL
CALCIUM SERPL-MCNC: 9.8 MG/DL
CHLORIDE SERPL-SCNC: 105 MMOL/L
CO2 SERPL-SCNC: 25 MMOL/L
CREAT SERPL-MCNC: 1.69 MG/DL
EGFR: 29 ML/MIN/1.73M2
GLUCOSE SERPL-MCNC: 81 MG/DL
NT-PROBNP SERPL-MCNC: 586 PG/ML
POTASSIUM SERPL-SCNC: 4.2 MMOL/L
SODIUM SERPL-SCNC: 147 MMOL/L

## 2025-03-03 ENCOUNTER — APPOINTMENT (OUTPATIENT)
Dept: GERIATRICS | Facility: CLINIC | Age: 89
End: 2025-03-03

## 2025-03-03 VITALS
HEIGHT: 64 IN | BODY MASS INDEX: 22.88 KG/M2 | HEART RATE: 60 BPM | WEIGHT: 134 LBS | OXYGEN SATURATION: 96 % | SYSTOLIC BLOOD PRESSURE: 150 MMHG | DIASTOLIC BLOOD PRESSURE: 75 MMHG | TEMPERATURE: 97.2 F | RESPIRATION RATE: 15 BRPM

## 2025-03-03 DIAGNOSIS — F41.8 OTHER SPECIFIED ANXIETY DISORDERS: ICD-10-CM

## 2025-03-03 DIAGNOSIS — N18.9 CHRONIC KIDNEY DISEASE, UNSPECIFIED: ICD-10-CM

## 2025-03-03 DIAGNOSIS — D64.9 ANEMIA, UNSPECIFIED: ICD-10-CM

## 2025-03-03 DIAGNOSIS — F03.90 UNSPECIFIED DEMENTIA W/OUT BEHAVIORAL DISTURBANCE: ICD-10-CM

## 2025-03-03 DIAGNOSIS — I10 ESSENTIAL (PRIMARY) HYPERTENSION: ICD-10-CM

## 2025-03-03 PROCEDURE — G2211 COMPLEX E/M VISIT ADD ON: CPT

## 2025-03-03 PROCEDURE — 99214 OFFICE O/P EST MOD 30 MIN: CPT

## 2025-03-04 LAB
ALBUMIN SERPL ELPH-MCNC: 4.4 G/DL
ALP BLD-CCNC: 66 U/L
ALT SERPL-CCNC: 40 U/L
ANION GAP SERPL CALC-SCNC: 14 MMOL/L
AST SERPL-CCNC: 38 U/L
BASOPHILS # BLD AUTO: 0.03 K/UL
BASOPHILS NFR BLD AUTO: 0.6 %
BILIRUB SERPL-MCNC: 0.5 MG/DL
BUN SERPL-MCNC: 29 MG/DL
CALCIUM SERPL-MCNC: 9.7 MG/DL
CHLORIDE SERPL-SCNC: 107 MMOL/L
CO2 SERPL-SCNC: 25 MMOL/L
CREAT SERPL-MCNC: 1.8 MG/DL
EGFRCR SERPLBLD CKD-EPI 2021: 27 ML/MIN/1.73M2
EOSINOPHIL # BLD AUTO: 0.03 K/UL
EOSINOPHIL NFR BLD AUTO: 0.6 %
FERRITIN SERPL-MCNC: 36 NG/ML
GLUCOSE SERPL-MCNC: 84 MG/DL
HCT VFR BLD CALC: 41.6 %
HGB BLD-MCNC: 13.2 G/DL
IMM GRANULOCYTES NFR BLD AUTO: 0.2 %
IRON SATN MFR SERPL: 22 %
IRON SERPL-MCNC: 77 UG/DL
LYMPHOCYTES # BLD AUTO: 1.25 K/UL
LYMPHOCYTES NFR BLD AUTO: 23.9 %
MAN DIFF?: NORMAL
MCHC RBC-ENTMCNC: 27.4 PG
MCHC RBC-ENTMCNC: 31.7 G/DL
MCV RBC AUTO: 86.3 FL
MONOCYTES # BLD AUTO: 0.37 K/UL
MONOCYTES NFR BLD AUTO: 7.1 %
NEUTROPHILS # BLD AUTO: 3.54 K/UL
NEUTROPHILS NFR BLD AUTO: 67.6 %
NT-PROBNP SERPL-MCNC: 574 PG/ML
PLATELET # BLD AUTO: 248 K/UL
POTASSIUM SERPL-SCNC: 4.3 MMOL/L
PROT SERPL-MCNC: 7.3 G/DL
RBC # BLD: 4.82 M/UL
RBC # FLD: 13.2 %
SODIUM SERPL-SCNC: 146 MMOL/L
TIBC SERPL-MCNC: 347 UG/DL
UIBC SERPL-MCNC: 269 UG/DL
WBC # FLD AUTO: 5.23 K/UL

## 2025-03-06 ENCOUNTER — NON-APPOINTMENT (OUTPATIENT)
Age: 89
End: 2025-03-06

## 2025-03-06 ENCOUNTER — APPOINTMENT (OUTPATIENT)
Dept: CARDIOLOGY | Facility: CLINIC | Age: 89
End: 2025-03-06
Payer: MEDICARE

## 2025-03-06 VITALS
WEIGHT: 137 LBS | HEIGHT: 64 IN | DIASTOLIC BLOOD PRESSURE: 82 MMHG | SYSTOLIC BLOOD PRESSURE: 122 MMHG | HEART RATE: 64 BPM | BODY MASS INDEX: 23.39 KG/M2

## 2025-03-06 DIAGNOSIS — G89.29 DORSALGIA, UNSPECIFIED: ICD-10-CM

## 2025-03-06 DIAGNOSIS — M54.9 DORSALGIA, UNSPECIFIED: ICD-10-CM

## 2025-03-06 DIAGNOSIS — E87.0 HYPEROSMOLALITY AND HYPERNATREMIA: ICD-10-CM

## 2025-03-06 DIAGNOSIS — Z74.09 OTHER REDUCED MOBILITY: ICD-10-CM

## 2025-03-06 DIAGNOSIS — R53.83 OTHER FATIGUE: ICD-10-CM

## 2025-03-06 DIAGNOSIS — Z78.9 OTHER REDUCED MOBILITY: ICD-10-CM

## 2025-03-06 DIAGNOSIS — I50.20 UNSPECIFIED SYSTOLIC (CONGESTIVE) HEART FAILURE: ICD-10-CM

## 2025-03-06 PROCEDURE — G2211 COMPLEX E/M VISIT ADD ON: CPT

## 2025-03-06 PROCEDURE — 93000 ELECTROCARDIOGRAM COMPLETE: CPT

## 2025-03-06 PROCEDURE — 99215 OFFICE O/P EST HI 40 MIN: CPT

## 2025-03-08 LAB
ALBUMIN SERPL ELPH-MCNC: 4.4 G/DL
ALP BLD-CCNC: 64 U/L
ALT SERPL-CCNC: 29 U/L
ANION GAP SERPL CALC-SCNC: 14 MMOL/L
AST SERPL-CCNC: 29 U/L
BASOPHILS # BLD AUTO: 0.04 K/UL
BASOPHILS NFR BLD AUTO: 0.8 %
BILIRUB SERPL-MCNC: 0.4 MG/DL
BUN SERPL-MCNC: 32 MG/DL
CALCIUM SERPL-MCNC: 9.8 MG/DL
CHLORIDE SERPL-SCNC: 105 MMOL/L
CHOLEST SERPL-MCNC: 183 MG/DL
CO2 SERPL-SCNC: 26 MMOL/L
CREAT SERPL-MCNC: 1.52 MG/DL
EGFRCR SERPLBLD CKD-EPI 2021: 33 ML/MIN/1.73M2
EOSINOPHIL # BLD AUTO: 0.06 K/UL
EOSINOPHIL NFR BLD AUTO: 1.2 %
ESTIMATED AVERAGE GLUCOSE: 114 MG/DL
GLUCOSE SERPL-MCNC: 110 MG/DL
HBA1C MFR BLD HPLC: 5.6 %
HCT VFR BLD CALC: 41.9 %
HDLC SERPL-MCNC: 67 MG/DL
HGB BLD-MCNC: 13.1 G/DL
IMM GRANULOCYTES NFR BLD AUTO: 0.4 %
LDLC SERPL CALC-MCNC: 99 MG/DL
LYMPHOCYTES # BLD AUTO: 1.55 K/UL
LYMPHOCYTES NFR BLD AUTO: 30.7 %
MAN DIFF?: NORMAL
MCHC RBC-ENTMCNC: 26.7 PG
MCHC RBC-ENTMCNC: 31.3 G/DL
MCV RBC AUTO: 85.5 FL
MONOCYTES # BLD AUTO: 0.37 K/UL
MONOCYTES NFR BLD AUTO: 7.3 %
NEUTROPHILS # BLD AUTO: 3.01 K/UL
NEUTROPHILS NFR BLD AUTO: 59.6 %
NONHDLC SERPL-MCNC: 115 MG/DL
PLATELET # BLD AUTO: 254 K/UL
POTASSIUM SERPL-SCNC: 4.3 MMOL/L
PROT SERPL-MCNC: 7 G/DL
RBC # BLD: 4.9 M/UL
RBC # FLD: 13.2 %
SODIUM SERPL-SCNC: 145 MMOL/L
TRIGL SERPL-MCNC: 86 MG/DL
TSH SERPL-ACNC: 3.25 UIU/ML
WBC # FLD AUTO: 5.05 K/UL

## 2025-03-10 ENCOUNTER — TRANSCRIPTION ENCOUNTER (OUTPATIENT)
Age: 89
End: 2025-03-10

## 2025-03-28 ENCOUNTER — TRANSCRIPTION ENCOUNTER (OUTPATIENT)
Age: 89
End: 2025-03-28

## 2025-04-01 ENCOUNTER — RX RENEWAL (OUTPATIENT)
Age: 89
End: 2025-04-01

## 2025-04-02 ENCOUNTER — TRANSCRIPTION ENCOUNTER (OUTPATIENT)
Age: 89
End: 2025-04-02

## 2025-04-02 ENCOUNTER — APPOINTMENT (OUTPATIENT)
Dept: CARDIOLOGY | Facility: CLINIC | Age: 89
End: 2025-04-02

## 2025-04-15 ENCOUNTER — TRANSCRIPTION ENCOUNTER (OUTPATIENT)
Age: 89
End: 2025-04-15

## 2025-04-21 ENCOUNTER — RX RENEWAL (OUTPATIENT)
Age: 89
End: 2025-04-21

## 2025-04-22 ENCOUNTER — RX RENEWAL (OUTPATIENT)
Age: 89
End: 2025-04-22

## 2025-05-29 ENCOUNTER — RX RENEWAL (OUTPATIENT)
Age: 89
End: 2025-05-29

## 2025-06-09 ENCOUNTER — APPOINTMENT (OUTPATIENT)
Dept: GERIATRICS | Facility: CLINIC | Age: 89
End: 2025-06-09

## 2025-09-03 ENCOUNTER — APPOINTMENT (OUTPATIENT)
Dept: CARDIOLOGY | Facility: CLINIC | Age: 89
End: 2025-09-03